# Patient Record
Sex: FEMALE | Race: WHITE | NOT HISPANIC OR LATINO | Employment: OTHER | URBAN - METROPOLITAN AREA
[De-identification: names, ages, dates, MRNs, and addresses within clinical notes are randomized per-mention and may not be internally consistent; named-entity substitution may affect disease eponyms.]

---

## 2017-01-16 ENCOUNTER — COMMUNICATION - HEALTHEAST (OUTPATIENT)
Dept: FAMILY MEDICINE | Facility: CLINIC | Age: 67
End: 2017-01-16

## 2017-01-16 DIAGNOSIS — E78.5 HYPERLIPIDEMIA: ICD-10-CM

## 2017-01-23 ENCOUNTER — OFFICE VISIT - HEALTHEAST (OUTPATIENT)
Dept: FAMILY MEDICINE | Facility: CLINIC | Age: 67
End: 2017-01-23

## 2017-01-23 DIAGNOSIS — E11.319 DIABETIC RETINOPATHY (H): ICD-10-CM

## 2017-01-23 DIAGNOSIS — M81.0 SENILE OSTEOPOROSIS: ICD-10-CM

## 2017-01-23 DIAGNOSIS — E78.49 OTHER HYPERLIPIDEMIA: ICD-10-CM

## 2017-01-23 DIAGNOSIS — I10 ESSENTIAL HYPERTENSION, BENIGN: ICD-10-CM

## 2017-01-23 LAB — HBA1C MFR BLD: 7.5 % (ref 3.5–6)

## 2017-01-23 ASSESSMENT — MIFFLIN-ST. JEOR: SCORE: 1281.54

## 2017-01-24 LAB — LDLC SERPL CALC-MCNC: 95 MG/DL

## 2017-01-25 ENCOUNTER — OFFICE VISIT - HEALTHEAST (OUTPATIENT)
Dept: EDUCATION SERVICES | Facility: CLINIC | Age: 67
End: 2017-01-25

## 2017-01-25 ENCOUNTER — COMMUNICATION - HEALTHEAST (OUTPATIENT)
Dept: EDUCATION SERVICES | Facility: CLINIC | Age: 67
End: 2017-01-25

## 2017-01-31 ENCOUNTER — COMMUNICATION - HEALTHEAST (OUTPATIENT)
Dept: EDUCATION SERVICES | Facility: CLINIC | Age: 67
End: 2017-01-31

## 2017-01-31 ENCOUNTER — COMMUNICATION - HEALTHEAST (OUTPATIENT)
Dept: ADMINISTRATIVE | Facility: CLINIC | Age: 67
End: 2017-01-31

## 2017-02-08 ENCOUNTER — RECORDS - HEALTHEAST (OUTPATIENT)
Dept: BONE DENSITY | Facility: CLINIC | Age: 67
End: 2017-02-08

## 2017-02-08 ENCOUNTER — RECORDS - HEALTHEAST (OUTPATIENT)
Dept: ADMINISTRATIVE | Facility: OTHER | Age: 67
End: 2017-02-08

## 2017-02-08 DIAGNOSIS — M81.0 AGE-RELATED OSTEOPOROSIS WITHOUT CURRENT PATHOLOGICAL FRACTURE: ICD-10-CM

## 2017-02-13 ENCOUNTER — AMBULATORY - HEALTHEAST (OUTPATIENT)
Dept: EDUCATION SERVICES | Facility: CLINIC | Age: 67
End: 2017-02-13

## 2017-02-16 ENCOUNTER — COMMUNICATION - HEALTHEAST (OUTPATIENT)
Dept: FAMILY MEDICINE | Facility: CLINIC | Age: 67
End: 2017-02-16

## 2017-02-16 DIAGNOSIS — E11.9 DM (DIABETES MELLITUS) (H): ICD-10-CM

## 2017-02-18 ENCOUNTER — COMMUNICATION - HEALTHEAST (OUTPATIENT)
Dept: FAMILY MEDICINE | Facility: CLINIC | Age: 67
End: 2017-02-18

## 2017-02-18 DIAGNOSIS — I10 ESSENTIAL HYPERTENSION, BENIGN: ICD-10-CM

## 2017-02-27 ENCOUNTER — OFFICE VISIT - HEALTHEAST (OUTPATIENT)
Dept: FAMILY MEDICINE | Facility: CLINIC | Age: 67
End: 2017-02-27

## 2017-02-27 DIAGNOSIS — R05.9 COUGH: ICD-10-CM

## 2017-02-27 DIAGNOSIS — I10 ESSENTIAL HYPERTENSION, BENIGN: ICD-10-CM

## 2017-02-27 DIAGNOSIS — M54.9 UPPER BACK PAIN ON LEFT SIDE: ICD-10-CM

## 2017-02-27 DIAGNOSIS — R07.9 CHEST PAIN: ICD-10-CM

## 2017-02-27 LAB
ATRIAL RATE - MUSE: 83 BPM
DIASTOLIC BLOOD PRESSURE - MUSE: NORMAL MMHG
INTERPRETATION ECG - MUSE: NORMAL
P AXIS - MUSE: 59 DEGREES
PR INTERVAL - MUSE: 156 MS
QRS DURATION - MUSE: 84 MS
QT - MUSE: 378 MS
QTC - MUSE: 444 MS
R AXIS - MUSE: 23 DEGREES
SYSTOLIC BLOOD PRESSURE - MUSE: NORMAL MMHG
T AXIS - MUSE: 42 DEGREES
VENTRICULAR RATE- MUSE: 83 BPM

## 2017-02-27 ASSESSMENT — MIFFLIN-ST. JEOR: SCORE: 1247.75

## 2017-03-14 ENCOUNTER — COMMUNICATION - HEALTHEAST (OUTPATIENT)
Dept: FAMILY MEDICINE | Facility: CLINIC | Age: 67
End: 2017-03-14

## 2017-03-27 ENCOUNTER — COMMUNICATION - HEALTHEAST (OUTPATIENT)
Dept: EDUCATION SERVICES | Facility: CLINIC | Age: 67
End: 2017-03-27

## 2017-03-27 DIAGNOSIS — E11.9 DIABETES (H): ICD-10-CM

## 2017-03-30 ENCOUNTER — COMMUNICATION - HEALTHEAST (OUTPATIENT)
Dept: FAMILY MEDICINE | Facility: CLINIC | Age: 67
End: 2017-03-30

## 2017-04-03 ENCOUNTER — COMMUNICATION - HEALTHEAST (OUTPATIENT)
Dept: FAMILY MEDICINE | Facility: CLINIC | Age: 67
End: 2017-04-03

## 2017-04-03 ENCOUNTER — AMBULATORY - HEALTHEAST (OUTPATIENT)
Dept: NURSING | Facility: CLINIC | Age: 67
End: 2017-04-03

## 2017-04-11 ENCOUNTER — AMBULATORY - HEALTHEAST (OUTPATIENT)
Dept: NURSING | Facility: CLINIC | Age: 67
End: 2017-04-11

## 2017-04-11 ENCOUNTER — COMMUNICATION - HEALTHEAST (OUTPATIENT)
Dept: FAMILY MEDICINE | Facility: CLINIC | Age: 67
End: 2017-04-11

## 2017-04-18 ENCOUNTER — COMMUNICATION - HEALTHEAST (OUTPATIENT)
Dept: FAMILY MEDICINE | Facility: CLINIC | Age: 67
End: 2017-04-18

## 2017-04-19 ENCOUNTER — COMMUNICATION - HEALTHEAST (OUTPATIENT)
Dept: FAMILY MEDICINE | Facility: CLINIC | Age: 67
End: 2017-04-19

## 2017-04-19 ENCOUNTER — AMBULATORY - HEALTHEAST (OUTPATIENT)
Dept: NURSING | Facility: CLINIC | Age: 67
End: 2017-04-19

## 2017-04-23 ENCOUNTER — COMMUNICATION - HEALTHEAST (OUTPATIENT)
Dept: FAMILY MEDICINE | Facility: CLINIC | Age: 67
End: 2017-04-23

## 2017-05-10 ENCOUNTER — COMMUNICATION - HEALTHEAST (OUTPATIENT)
Dept: FAMILY MEDICINE | Facility: CLINIC | Age: 67
End: 2017-05-10

## 2017-05-16 ENCOUNTER — COMMUNICATION - HEALTHEAST (OUTPATIENT)
Dept: FAMILY MEDICINE | Facility: CLINIC | Age: 67
End: 2017-05-16

## 2017-05-16 DIAGNOSIS — E11.9 DIABETES (H): ICD-10-CM

## 2017-06-01 ENCOUNTER — COMMUNICATION - HEALTHEAST (OUTPATIENT)
Dept: FAMILY MEDICINE | Facility: CLINIC | Age: 67
End: 2017-06-01

## 2017-06-01 DIAGNOSIS — E11.9 DIABETES (H): ICD-10-CM

## 2017-06-14 ENCOUNTER — COMMUNICATION - HEALTHEAST (OUTPATIENT)
Dept: FAMILY MEDICINE | Facility: CLINIC | Age: 67
End: 2017-06-14

## 2017-06-14 ENCOUNTER — OFFICE VISIT - HEALTHEAST (OUTPATIENT)
Dept: FAMILY MEDICINE | Facility: CLINIC | Age: 67
End: 2017-06-14

## 2017-06-14 DIAGNOSIS — M85.80 OSTEOPENIA: ICD-10-CM

## 2017-06-14 DIAGNOSIS — I10 ESSENTIAL HYPERTENSION, BENIGN: ICD-10-CM

## 2017-06-14 DIAGNOSIS — E66.3 OVERWEIGHT: ICD-10-CM

## 2017-06-14 DIAGNOSIS — Z00.00 WELL ADULT EXAM: ICD-10-CM

## 2017-06-14 DIAGNOSIS — E78.49 OTHER HYPERLIPIDEMIA: ICD-10-CM

## 2017-06-14 DIAGNOSIS — E11.9 TYPE 2 DIABETES MELLITUS (H): ICD-10-CM

## 2017-06-14 LAB
CHOLEST SERPL-MCNC: 178 MG/DL
FASTING STATUS PATIENT QL REPORTED: YES
HBA1C MFR BLD: 8.6 % (ref 3.5–6)
HDLC SERPL-MCNC: 66 MG/DL
LDLC SERPL CALC-MCNC: 103 MG/DL
TRIGL SERPL-MCNC: 46 MG/DL

## 2017-06-14 ASSESSMENT — MIFFLIN-ST. JEOR: SCORE: 1211.92

## 2017-07-13 ENCOUNTER — COMMUNICATION - HEALTHEAST (OUTPATIENT)
Dept: FAMILY MEDICINE | Facility: CLINIC | Age: 67
End: 2017-07-13

## 2017-07-13 DIAGNOSIS — E11.9 T2DM (TYPE 2 DIABETES MELLITUS) (H): ICD-10-CM

## 2017-08-03 ENCOUNTER — COMMUNICATION - HEALTHEAST (OUTPATIENT)
Dept: FAMILY MEDICINE | Facility: CLINIC | Age: 67
End: 2017-08-03

## 2017-08-25 ENCOUNTER — COMMUNICATION - HEALTHEAST (OUTPATIENT)
Dept: FAMILY MEDICINE | Facility: CLINIC | Age: 67
End: 2017-08-25

## 2017-09-15 ENCOUNTER — RECORDS - HEALTHEAST (OUTPATIENT)
Dept: ADMINISTRATIVE | Facility: OTHER | Age: 67
End: 2017-09-15

## 2017-09-19 ENCOUNTER — OFFICE VISIT - HEALTHEAST (OUTPATIENT)
Dept: FAMILY MEDICINE | Facility: CLINIC | Age: 67
End: 2017-09-19

## 2017-09-19 DIAGNOSIS — L60.0 INGROWN LEFT BIG TOENAIL: ICD-10-CM

## 2017-09-19 DIAGNOSIS — I10 ESSENTIAL HYPERTENSION, BENIGN: ICD-10-CM

## 2017-09-19 DIAGNOSIS — E78.49 OTHER HYPERLIPIDEMIA: ICD-10-CM

## 2017-09-19 DIAGNOSIS — R25.2 LEG CRAMPS: ICD-10-CM

## 2017-09-19 LAB — HBA1C MFR BLD: 8.2 % (ref 3.5–6)

## 2017-09-19 ASSESSMENT — MIFFLIN-ST. JEOR: SCORE: 1223.71

## 2017-09-20 ENCOUNTER — COMMUNICATION - HEALTHEAST (OUTPATIENT)
Dept: FAMILY MEDICINE | Facility: CLINIC | Age: 67
End: 2017-09-20

## 2017-09-20 DIAGNOSIS — E78.5 HYPERLIPIDEMIA: ICD-10-CM

## 2017-09-29 ENCOUNTER — COMMUNICATION - HEALTHEAST (OUTPATIENT)
Dept: FAMILY MEDICINE | Facility: CLINIC | Age: 67
End: 2017-09-29

## 2017-10-23 ENCOUNTER — COMMUNICATION - HEALTHEAST (OUTPATIENT)
Dept: SCHEDULING | Facility: CLINIC | Age: 67
End: 2017-10-23

## 2017-10-24 ENCOUNTER — COMMUNICATION - HEALTHEAST (OUTPATIENT)
Dept: SCHEDULING | Facility: CLINIC | Age: 67
End: 2017-10-24

## 2017-11-10 ENCOUNTER — COMMUNICATION - HEALTHEAST (OUTPATIENT)
Dept: ADMINISTRATIVE | Facility: CLINIC | Age: 67
End: 2017-11-10

## 2017-11-13 ENCOUNTER — COMMUNICATION - HEALTHEAST (OUTPATIENT)
Dept: FAMILY MEDICINE | Facility: CLINIC | Age: 67
End: 2017-11-13

## 2017-11-14 ENCOUNTER — COMMUNICATION - HEALTHEAST (OUTPATIENT)
Dept: FAMILY MEDICINE | Facility: CLINIC | Age: 67
End: 2017-11-14

## 2017-11-16 ENCOUNTER — COMMUNICATION - HEALTHEAST (OUTPATIENT)
Dept: ADMINISTRATIVE | Facility: CLINIC | Age: 67
End: 2017-11-16

## 2017-11-16 ENCOUNTER — COMMUNICATION - HEALTHEAST (OUTPATIENT)
Dept: FAMILY MEDICINE | Facility: CLINIC | Age: 67
End: 2017-11-16

## 2017-11-16 ENCOUNTER — AMBULATORY - HEALTHEAST (OUTPATIENT)
Dept: ENDOCRINOLOGY | Facility: CLINIC | Age: 67
End: 2017-11-16

## 2017-12-01 ENCOUNTER — COMMUNICATION - HEALTHEAST (OUTPATIENT)
Dept: ENDOCRINOLOGY | Facility: CLINIC | Age: 67
End: 2017-12-01

## 2017-12-06 ENCOUNTER — COMMUNICATION - HEALTHEAST (OUTPATIENT)
Dept: FAMILY MEDICINE | Facility: CLINIC | Age: 67
End: 2017-12-06

## 2017-12-07 ENCOUNTER — COMMUNICATION - HEALTHEAST (OUTPATIENT)
Dept: FAMILY MEDICINE | Facility: CLINIC | Age: 67
End: 2017-12-07

## 2017-12-18 ENCOUNTER — COMMUNICATION - HEALTHEAST (OUTPATIENT)
Dept: ADMINISTRATIVE | Facility: CLINIC | Age: 67
End: 2017-12-18

## 2017-12-18 ENCOUNTER — COMMUNICATION - HEALTHEAST (OUTPATIENT)
Dept: FAMILY MEDICINE | Facility: CLINIC | Age: 67
End: 2017-12-18

## 2017-12-19 ENCOUNTER — OFFICE VISIT - HEALTHEAST (OUTPATIENT)
Dept: FAMILY MEDICINE | Facility: CLINIC | Age: 67
End: 2017-12-19

## 2017-12-19 DIAGNOSIS — J40 BRONCHITIS: ICD-10-CM

## 2017-12-19 DIAGNOSIS — J01.90 ACUTE NON-RECURRENT SINUSITIS, UNSPECIFIED LOCATION: ICD-10-CM

## 2017-12-19 ASSESSMENT — MIFFLIN-ST. JEOR: SCORE: 1211.02

## 2018-01-04 ENCOUNTER — AMBULATORY - HEALTHEAST (OUTPATIENT)
Dept: LAB | Facility: CLINIC | Age: 68
End: 2018-01-04

## 2018-01-04 LAB
ANION GAP SERPL CALCULATED.3IONS-SCNC: 9 MMOL/L (ref 5–18)
BUN SERPL-MCNC: 13 MG/DL (ref 8–22)
CALCIUM SERPL-MCNC: 9.8 MG/DL (ref 8.5–10.5)
CHLORIDE BLD-SCNC: 103 MMOL/L (ref 98–107)
CO2 SERPL-SCNC: 28 MMOL/L (ref 22–31)
CREAT SERPL-MCNC: 0.68 MG/DL (ref 0.6–1.1)
GFR SERPL CREATININE-BSD FRML MDRD: >60 ML/MIN/1.73M2
GLUCOSE BLD-MCNC: 94 MG/DL (ref 70–125)
HBA1C MFR BLD: 8.9 % (ref 3.5–6)
POTASSIUM BLD-SCNC: 5.2 MMOL/L (ref 3.5–5)
SODIUM SERPL-SCNC: 140 MMOL/L (ref 136–145)

## 2018-01-16 ENCOUNTER — OFFICE VISIT - HEALTHEAST (OUTPATIENT)
Dept: ENDOCRINOLOGY | Facility: CLINIC | Age: 68
End: 2018-01-16

## 2018-01-16 ENCOUNTER — AMBULATORY - HEALTHEAST (OUTPATIENT)
Dept: ENDOCRINOLOGY | Facility: CLINIC | Age: 68
End: 2018-01-16

## 2018-01-16 ENCOUNTER — COMMUNICATION - HEALTHEAST (OUTPATIENT)
Dept: FAMILY MEDICINE | Facility: CLINIC | Age: 68
End: 2018-01-16

## 2018-01-16 DIAGNOSIS — E11.9 T2DM (TYPE 2 DIABETES MELLITUS) (H): ICD-10-CM

## 2018-01-16 ASSESSMENT — MIFFLIN-ST. JEOR: SCORE: 1230.52

## 2018-02-15 ENCOUNTER — COMMUNICATION - HEALTHEAST (OUTPATIENT)
Dept: FAMILY MEDICINE | Facility: CLINIC | Age: 68
End: 2018-02-15

## 2018-02-15 DIAGNOSIS — E11.9 DIABETES (H): ICD-10-CM

## 2018-02-15 DIAGNOSIS — E11.9 DM (DIABETES MELLITUS) (H): ICD-10-CM

## 2018-03-19 ENCOUNTER — COMMUNICATION - HEALTHEAST (OUTPATIENT)
Dept: FAMILY MEDICINE | Facility: CLINIC | Age: 68
End: 2018-03-19

## 2018-03-19 DIAGNOSIS — I10 ESSENTIAL HYPERTENSION, BENIGN: ICD-10-CM

## 2018-03-25 ENCOUNTER — COMMUNICATION - HEALTHEAST (OUTPATIENT)
Dept: FAMILY MEDICINE | Facility: CLINIC | Age: 68
End: 2018-03-25

## 2018-03-25 DIAGNOSIS — E11.9 T2DM (TYPE 2 DIABETES MELLITUS) (H): ICD-10-CM

## 2018-04-17 ENCOUNTER — AMBULATORY - HEALTHEAST (OUTPATIENT)
Dept: LAB | Facility: CLINIC | Age: 68
End: 2018-04-17

## 2018-04-17 ENCOUNTER — COMMUNICATION - HEALTHEAST (OUTPATIENT)
Dept: ENDOCRINOLOGY | Facility: CLINIC | Age: 68
End: 2018-04-17

## 2018-04-17 LAB — HBA1C MFR BLD: 9.2 % (ref 3.5–6)

## 2018-04-24 ENCOUNTER — COMMUNICATION - HEALTHEAST (OUTPATIENT)
Dept: ADMINISTRATIVE | Facility: CLINIC | Age: 68
End: 2018-04-24

## 2018-04-25 ENCOUNTER — COMMUNICATION - HEALTHEAST (OUTPATIENT)
Dept: SCHEDULING | Facility: CLINIC | Age: 68
End: 2018-04-25

## 2018-05-15 ENCOUNTER — AMBULATORY - HEALTHEAST (OUTPATIENT)
Dept: ENDOCRINOLOGY | Facility: CLINIC | Age: 68
End: 2018-05-15

## 2018-05-15 ENCOUNTER — OFFICE VISIT - HEALTHEAST (OUTPATIENT)
Dept: ENDOCRINOLOGY | Facility: CLINIC | Age: 68
End: 2018-05-15

## 2018-05-15 DIAGNOSIS — E11.9 DIABETES (H): ICD-10-CM

## 2018-05-15 DIAGNOSIS — J06.9 UPPER RESPIRATORY TRACT INFECTION, UNSPECIFIED TYPE: ICD-10-CM

## 2018-05-15 ASSESSMENT — MIFFLIN-ST. JEOR: SCORE: 1268.62

## 2018-05-21 ENCOUNTER — COMMUNICATION - HEALTHEAST (OUTPATIENT)
Dept: ENDOCRINOLOGY | Facility: CLINIC | Age: 68
End: 2018-05-21

## 2018-06-30 ENCOUNTER — COMMUNICATION - HEALTHEAST (OUTPATIENT)
Dept: FAMILY MEDICINE | Facility: CLINIC | Age: 68
End: 2018-06-30

## 2018-06-30 DIAGNOSIS — E11.9 T2DM (TYPE 2 DIABETES MELLITUS) (H): ICD-10-CM

## 2018-07-09 ENCOUNTER — COMMUNICATION - HEALTHEAST (OUTPATIENT)
Dept: FAMILY MEDICINE | Facility: CLINIC | Age: 68
End: 2018-07-09

## 2018-07-09 DIAGNOSIS — E11.9 T2DM (TYPE 2 DIABETES MELLITUS) (H): ICD-10-CM

## 2018-07-10 ENCOUNTER — RECORDS - HEALTHEAST (OUTPATIENT)
Dept: MAMMOGRAPHY | Facility: CLINIC | Age: 68
End: 2018-07-10

## 2018-07-10 DIAGNOSIS — Z12.31 ENCOUNTER FOR SCREENING MAMMOGRAM FOR MALIGNANT NEOPLASM OF BREAST: ICD-10-CM

## 2018-07-30 ENCOUNTER — COMMUNICATION - HEALTHEAST (OUTPATIENT)
Dept: ADMINISTRATIVE | Facility: CLINIC | Age: 68
End: 2018-07-30

## 2018-07-30 ENCOUNTER — AMBULATORY - HEALTHEAST (OUTPATIENT)
Dept: ENDOCRINOLOGY | Facility: CLINIC | Age: 68
End: 2018-07-30

## 2018-08-01 ENCOUNTER — COMMUNICATION - HEALTHEAST (OUTPATIENT)
Dept: ADMINISTRATIVE | Facility: CLINIC | Age: 68
End: 2018-08-01

## 2018-08-15 ENCOUNTER — AMBULATORY - HEALTHEAST (OUTPATIENT)
Dept: LAB | Facility: CLINIC | Age: 68
End: 2018-08-15

## 2018-08-15 LAB
ANION GAP SERPL CALCULATED.3IONS-SCNC: 9 MMOL/L (ref 5–18)
BUN SERPL-MCNC: 13 MG/DL (ref 8–22)
CALCIUM SERPL-MCNC: 10.4 MG/DL (ref 8.5–10.5)
CHLORIDE BLD-SCNC: 97 MMOL/L (ref 98–107)
CO2 SERPL-SCNC: 26 MMOL/L (ref 22–31)
CREAT SERPL-MCNC: 0.66 MG/DL (ref 0.6–1.1)
CREAT UR-MCNC: 25.9 MG/DL
GFR SERPL CREATININE-BSD FRML MDRD: >60 ML/MIN/1.73M2
GLUCOSE BLD-MCNC: 105 MG/DL (ref 70–125)
LDLC SERPL CALC-MCNC: 110 MG/DL
MICROALBUMIN UR-MCNC: <0.5 MG/DL (ref 0–1.99)
MICROALBUMIN/CREAT UR: NORMAL MG/G
POTASSIUM BLD-SCNC: 5.2 MMOL/L (ref 3.5–5)
SODIUM SERPL-SCNC: 132 MMOL/L (ref 136–145)

## 2018-08-16 LAB — HBA1C MFR BLD: 8.1 % (ref 4.2–6.1)

## 2018-08-21 ENCOUNTER — OFFICE VISIT - HEALTHEAST (OUTPATIENT)
Dept: ENDOCRINOLOGY | Facility: CLINIC | Age: 68
End: 2018-08-21

## 2018-08-21 ENCOUNTER — AMBULATORY - HEALTHEAST (OUTPATIENT)
Dept: ENDOCRINOLOGY | Facility: CLINIC | Age: 68
End: 2018-08-21

## 2018-08-21 ASSESSMENT — MIFFLIN-ST. JEOR: SCORE: 1265.45

## 2018-10-15 ENCOUNTER — RECORDS - HEALTHEAST (OUTPATIENT)
Dept: ADMINISTRATIVE | Facility: OTHER | Age: 68
End: 2018-10-15

## 2018-11-07 ENCOUNTER — OFFICE VISIT - HEALTHEAST (OUTPATIENT)
Dept: FAMILY MEDICINE | Facility: CLINIC | Age: 68
End: 2018-11-07

## 2018-11-07 ENCOUNTER — COMMUNICATION - HEALTHEAST (OUTPATIENT)
Dept: SCHEDULING | Facility: CLINIC | Age: 68
End: 2018-11-07

## 2018-11-07 DIAGNOSIS — R25.2 LEG CRAMPING: ICD-10-CM

## 2018-11-07 DIAGNOSIS — R19.7 VOMITING AND DIARRHEA: ICD-10-CM

## 2018-11-07 DIAGNOSIS — R22.0 TONGUE SWELLING: ICD-10-CM

## 2018-11-07 DIAGNOSIS — R11.10 VOMITING AND DIARRHEA: ICD-10-CM

## 2018-11-12 ENCOUNTER — COMMUNICATION - HEALTHEAST (OUTPATIENT)
Dept: TELEHEALTH | Facility: CLINIC | Age: 68
End: 2018-11-12

## 2018-11-12 ENCOUNTER — AMBULATORY - HEALTHEAST (OUTPATIENT)
Dept: ENDOCRINOLOGY | Facility: CLINIC | Age: 68
End: 2018-11-12

## 2018-11-12 ENCOUNTER — COMMUNICATION - HEALTHEAST (OUTPATIENT)
Dept: ENDOCRINOLOGY | Facility: CLINIC | Age: 68
End: 2018-11-12

## 2018-11-12 ENCOUNTER — COMMUNICATION - HEALTHEAST (OUTPATIENT)
Dept: CARE COORDINATION | Facility: CLINIC | Age: 68
End: 2018-11-12

## 2018-11-19 ENCOUNTER — OFFICE VISIT - HEALTHEAST (OUTPATIENT)
Dept: FAMILY MEDICINE | Facility: CLINIC | Age: 68
End: 2018-11-19

## 2018-11-19 DIAGNOSIS — Z79.4 TYPE 2 DIABETES MELLITUS WITH DIABETIC NEUROPATHY, WITH LONG-TERM CURRENT USE OF INSULIN (H): ICD-10-CM

## 2018-11-19 DIAGNOSIS — I10 ESSENTIAL HYPERTENSION, BENIGN: ICD-10-CM

## 2018-11-19 DIAGNOSIS — M54.41 RIGHT-SIDED LOW BACK PAIN WITH RIGHT-SIDED SCIATICA, UNSPECIFIED CHRONICITY: ICD-10-CM

## 2018-11-19 DIAGNOSIS — E11.40 TYPE 2 DIABETES MELLITUS WITH DIABETIC NEUROPATHY, WITH LONG-TERM CURRENT USE OF INSULIN (H): ICD-10-CM

## 2018-11-19 DIAGNOSIS — E87.1 HYPONATREMIA: ICD-10-CM

## 2018-11-19 DIAGNOSIS — T78.3XXD ANGIOEDEMA, SUBSEQUENT ENCOUNTER: ICD-10-CM

## 2018-11-19 DIAGNOSIS — Z09 HOSPITAL DISCHARGE FOLLOW-UP: ICD-10-CM

## 2018-11-19 DIAGNOSIS — J32.9 SINUSITIS, UNSPECIFIED CHRONICITY, UNSPECIFIED LOCATION: ICD-10-CM

## 2018-11-19 LAB
ANION GAP SERPL CALCULATED.3IONS-SCNC: 10 MMOL/L (ref 5–18)
BUN SERPL-MCNC: 14 MG/DL (ref 8–22)
CALCIUM SERPL-MCNC: 10.4 MG/DL (ref 8.5–10.5)
CHLORIDE BLD-SCNC: 97 MMOL/L (ref 98–107)
CO2 SERPL-SCNC: 28 MMOL/L (ref 22–31)
CREAT SERPL-MCNC: 0.71 MG/DL (ref 0.6–1.1)
GFR SERPL CREATININE-BSD FRML MDRD: >60 ML/MIN/1.73M2
GLUCOSE BLD-MCNC: 290 MG/DL (ref 70–125)
HBA1C MFR BLD: 9.5 % (ref 4.2–6.1)
POTASSIUM BLD-SCNC: 5.2 MMOL/L (ref 3.5–5)
SODIUM SERPL-SCNC: 135 MMOL/L (ref 136–145)

## 2018-11-19 ASSESSMENT — MIFFLIN-ST. JEOR: SCORE: 1250.71

## 2018-11-20 ENCOUNTER — COMMUNICATION - HEALTHEAST (OUTPATIENT)
Dept: FAMILY MEDICINE | Facility: CLINIC | Age: 68
End: 2018-11-20

## 2018-11-26 ENCOUNTER — COMMUNICATION - HEALTHEAST (OUTPATIENT)
Dept: FAMILY MEDICINE | Facility: CLINIC | Age: 68
End: 2018-11-26

## 2018-11-26 ENCOUNTER — COMMUNICATION - HEALTHEAST (OUTPATIENT)
Dept: ENDOCRINOLOGY | Facility: CLINIC | Age: 68
End: 2018-11-26

## 2018-11-26 DIAGNOSIS — E11.40 TYPE 2 DIABETES MELLITUS WITH DIABETIC NEUROPATHY, WITH LONG-TERM CURRENT USE OF INSULIN (H): ICD-10-CM

## 2018-11-26 DIAGNOSIS — Z79.4 TYPE 2 DIABETES MELLITUS WITH DIABETIC NEUROPATHY, WITH LONG-TERM CURRENT USE OF INSULIN (H): ICD-10-CM

## 2018-12-03 ENCOUNTER — COMMUNICATION - HEALTHEAST (OUTPATIENT)
Dept: FAMILY MEDICINE | Facility: CLINIC | Age: 68
End: 2018-12-03

## 2018-12-06 ENCOUNTER — COMMUNICATION - HEALTHEAST (OUTPATIENT)
Dept: ADMINISTRATIVE | Facility: CLINIC | Age: 68
End: 2018-12-06

## 2018-12-06 ENCOUNTER — COMMUNICATION - HEALTHEAST (OUTPATIENT)
Dept: FAMILY MEDICINE | Facility: CLINIC | Age: 68
End: 2018-12-06

## 2018-12-11 ENCOUNTER — AMBULATORY - HEALTHEAST (OUTPATIENT)
Dept: NURSING | Facility: CLINIC | Age: 68
End: 2018-12-11

## 2018-12-19 ENCOUNTER — RECORDS - HEALTHEAST (OUTPATIENT)
Dept: ADMINISTRATIVE | Facility: OTHER | Age: 68
End: 2018-12-19

## 2019-01-29 ENCOUNTER — COMMUNICATION - HEALTHEAST (OUTPATIENT)
Dept: FAMILY MEDICINE | Facility: CLINIC | Age: 69
End: 2019-01-29

## 2019-02-01 ENCOUNTER — AMBULATORY - HEALTHEAST (OUTPATIENT)
Dept: FAMILY MEDICINE | Facility: CLINIC | Age: 69
End: 2019-02-01

## 2019-02-04 ENCOUNTER — AMBULATORY - HEALTHEAST (OUTPATIENT)
Dept: NURSING | Facility: CLINIC | Age: 69
End: 2019-02-04

## 2019-02-11 ENCOUNTER — COMMUNICATION - HEALTHEAST (OUTPATIENT)
Dept: FAMILY MEDICINE | Facility: CLINIC | Age: 69
End: 2019-02-11

## 2019-03-06 ENCOUNTER — AMBULATORY - HEALTHEAST (OUTPATIENT)
Dept: LAB | Facility: CLINIC | Age: 69
End: 2019-03-06

## 2019-03-06 LAB
ANION GAP SERPL CALCULATED.3IONS-SCNC: 11 MMOL/L (ref 5–18)
BUN SERPL-MCNC: 12 MG/DL (ref 8–22)
CALCIUM SERPL-MCNC: 9.9 MG/DL (ref 8.5–10.5)
CHLORIDE BLD-SCNC: 99 MMOL/L (ref 98–107)
CO2 SERPL-SCNC: 26 MMOL/L (ref 22–31)
CREAT SERPL-MCNC: 0.67 MG/DL (ref 0.6–1.1)
GFR SERPL CREATININE-BSD FRML MDRD: >60 ML/MIN/1.73M2
GLUCOSE BLD-MCNC: 118 MG/DL (ref 70–125)
HBA1C MFR BLD: 7.5 % (ref 3.5–6)
POTASSIUM BLD-SCNC: 4.5 MMOL/L (ref 3.5–5)
SODIUM SERPL-SCNC: 136 MMOL/L (ref 136–145)

## 2019-03-13 ENCOUNTER — OFFICE VISIT - HEALTHEAST (OUTPATIENT)
Dept: ENDOCRINOLOGY | Facility: CLINIC | Age: 69
End: 2019-03-13

## 2019-03-13 ASSESSMENT — MIFFLIN-ST. JEOR: SCORE: 1282.91

## 2019-03-14 ENCOUNTER — COMMUNICATION - HEALTHEAST (OUTPATIENT)
Dept: FAMILY MEDICINE | Facility: CLINIC | Age: 69
End: 2019-03-14

## 2019-04-11 ENCOUNTER — COMMUNICATION - HEALTHEAST (OUTPATIENT)
Dept: SCHEDULING | Facility: CLINIC | Age: 69
End: 2019-04-11

## 2019-04-18 ENCOUNTER — COMMUNICATION - HEALTHEAST (OUTPATIENT)
Dept: FAMILY MEDICINE | Facility: CLINIC | Age: 69
End: 2019-04-18

## 2019-04-18 DIAGNOSIS — Z23 ENCOUNTER FOR IMMUNIZATION: ICD-10-CM

## 2019-04-24 ENCOUNTER — COMMUNICATION - HEALTHEAST (OUTPATIENT)
Dept: FAMILY MEDICINE | Facility: CLINIC | Age: 69
End: 2019-04-24

## 2019-05-06 ENCOUNTER — AMBULATORY - HEALTHEAST (OUTPATIENT)
Dept: NURSING | Facility: CLINIC | Age: 69
End: 2019-05-06

## 2019-05-06 DIAGNOSIS — Z23 ENCOUNTER FOR IMMUNIZATION: ICD-10-CM

## 2019-05-15 ENCOUNTER — COMMUNICATION - HEALTHEAST (OUTPATIENT)
Dept: FAMILY MEDICINE | Facility: CLINIC | Age: 69
End: 2019-05-15

## 2019-06-03 ENCOUNTER — COMMUNICATION - HEALTHEAST (OUTPATIENT)
Dept: FAMILY MEDICINE | Facility: CLINIC | Age: 69
End: 2019-06-03

## 2019-06-03 DIAGNOSIS — E11.9 DIABETES (H): ICD-10-CM

## 2019-06-19 ENCOUNTER — OFFICE VISIT - HEALTHEAST (OUTPATIENT)
Dept: FAMILY MEDICINE | Facility: CLINIC | Age: 69
End: 2019-06-19

## 2019-06-19 ENCOUNTER — COMMUNICATION - HEALTHEAST (OUTPATIENT)
Dept: FAMILY MEDICINE | Facility: CLINIC | Age: 69
End: 2019-06-19

## 2019-06-19 DIAGNOSIS — R93.7 ABNORMAL BONE DENSITY SCREENING: ICD-10-CM

## 2019-06-19 DIAGNOSIS — E11.319 DIABETIC RETINOPATHY ASSOCIATED WITH TYPE 2 DIABETES MELLITUS, MACULAR EDEMA PRESENCE UNSPECIFIED, UNSPECIFIED LATERALITY, UNSPECIFIED RETINOPATHY SEVERITY (H): ICD-10-CM

## 2019-06-19 DIAGNOSIS — Z12.11 SCREEN FOR COLON CANCER: ICD-10-CM

## 2019-06-19 DIAGNOSIS — Z13.820 SCREENING FOR OSTEOPOROSIS: ICD-10-CM

## 2019-06-19 DIAGNOSIS — Z12.31 VISIT FOR SCREENING MAMMOGRAM: ICD-10-CM

## 2019-06-19 DIAGNOSIS — E11.42 DIABETIC POLYNEUROPATHY ASSOCIATED WITH TYPE 2 DIABETES MELLITUS (H): ICD-10-CM

## 2019-06-19 DIAGNOSIS — Z79.4 TYPE 2 DIABETES MELLITUS WITH DIABETIC NEUROPATHY, WITH LONG-TERM CURRENT USE OF INSULIN (H): ICD-10-CM

## 2019-06-19 DIAGNOSIS — I10 ESSENTIAL HYPERTENSION, BENIGN: ICD-10-CM

## 2019-06-19 DIAGNOSIS — R25.2 CRAMPS OF LOWER EXTREMITY: ICD-10-CM

## 2019-06-19 DIAGNOSIS — Z13.220 SCREENING CHOLESTEROL LEVEL: ICD-10-CM

## 2019-06-19 DIAGNOSIS — E11.40 TYPE 2 DIABETES MELLITUS WITH DIABETIC NEUROPATHY, WITH LONG-TERM CURRENT USE OF INSULIN (H): ICD-10-CM

## 2019-06-19 LAB
ALBUMIN SERPL-MCNC: 4 G/DL (ref 3.5–5)
ALP SERPL-CCNC: 77 U/L (ref 45–120)
ALT SERPL W P-5'-P-CCNC: 17 U/L (ref 0–45)
ANION GAP SERPL CALCULATED.3IONS-SCNC: 8 MMOL/L (ref 5–18)
AST SERPL W P-5'-P-CCNC: 19 U/L (ref 0–40)
BILIRUB SERPL-MCNC: 0.3 MG/DL (ref 0–1)
BUN SERPL-MCNC: 16 MG/DL (ref 8–22)
CALCIUM SERPL-MCNC: 9.9 MG/DL (ref 8.5–10.5)
CHLORIDE BLD-SCNC: 104 MMOL/L (ref 98–107)
CHOLEST SERPL-MCNC: 202 MG/DL
CO2 SERPL-SCNC: 27 MMOL/L (ref 22–31)
CREAT SERPL-MCNC: 0.65 MG/DL (ref 0.6–1.1)
CREAT UR-MCNC: 44.7 MG/DL
FASTING STATUS PATIENT QL REPORTED: YES
GFR SERPL CREATININE-BSD FRML MDRD: >60 ML/MIN/1.73M2
GLUCOSE BLD-MCNC: 93 MG/DL (ref 70–125)
HBA1C MFR BLD: 7.3 % (ref 3.5–6)
HDLC SERPL-MCNC: 76 MG/DL
LDLC SERPL CALC-MCNC: 116 MG/DL
MAGNESIUM SERPL-MCNC: 2 MG/DL (ref 1.8–2.6)
MICROALBUMIN UR-MCNC: 0.89 MG/DL (ref 0–1.99)
MICROALBUMIN/CREAT UR: 19.9 MG/G
POTASSIUM BLD-SCNC: 5.1 MMOL/L (ref 3.5–5)
PROT SERPL-MCNC: 6.3 G/DL (ref 6–8)
SODIUM SERPL-SCNC: 139 MMOL/L (ref 136–145)
TRIGL SERPL-MCNC: 49 MG/DL
VIT B12 SERPL-MCNC: 856 PG/ML (ref 213–816)

## 2019-06-19 ASSESSMENT — MIFFLIN-ST. JEOR: SCORE: 1270.44

## 2019-06-20 ENCOUNTER — AMBULATORY - HEALTHEAST (OUTPATIENT)
Dept: SCHEDULING | Facility: CLINIC | Age: 69
End: 2019-06-20

## 2019-06-20 DIAGNOSIS — R93.7 ABNORMAL BONE DENSITY SCREENING: ICD-10-CM

## 2019-06-22 ENCOUNTER — COMMUNICATION - HEALTHEAST (OUTPATIENT)
Dept: FAMILY MEDICINE | Facility: CLINIC | Age: 69
End: 2019-06-22

## 2019-06-24 ENCOUNTER — AMBULATORY - HEALTHEAST (OUTPATIENT)
Dept: FAMILY MEDICINE | Facility: CLINIC | Age: 69
End: 2019-06-24

## 2019-06-24 DIAGNOSIS — I10 ESSENTIAL HYPERTENSION, BENIGN: ICD-10-CM

## 2019-06-25 ENCOUNTER — OFFICE VISIT - HEALTHEAST (OUTPATIENT)
Dept: PHYSICAL THERAPY | Facility: REHABILITATION | Age: 69
End: 2019-06-25

## 2019-06-25 DIAGNOSIS — R25.2 CRAMP OF BOTH LOWER EXTREMITIES: ICD-10-CM

## 2019-06-25 DIAGNOSIS — E11.42 DIABETIC POLYNEUROPATHY ASSOCIATED WITH TYPE 2 DIABETES MELLITUS (H): ICD-10-CM

## 2019-06-25 DIAGNOSIS — R25.2 CRAMPS OF LOWER EXTREMITY: ICD-10-CM

## 2019-07-08 ENCOUNTER — OFFICE VISIT - HEALTHEAST (OUTPATIENT)
Dept: PHYSICAL THERAPY | Facility: REHABILITATION | Age: 69
End: 2019-07-08

## 2019-07-08 DIAGNOSIS — E11.42 DIABETIC POLYNEUROPATHY ASSOCIATED WITH TYPE 2 DIABETES MELLITUS (H): ICD-10-CM

## 2019-07-08 DIAGNOSIS — R25.2 CRAMPS OF LOWER EXTREMITY: ICD-10-CM

## 2019-07-08 DIAGNOSIS — R25.2 CRAMP OF BOTH LOWER EXTREMITIES: ICD-10-CM

## 2019-07-11 ENCOUNTER — AMBULATORY - HEALTHEAST (OUTPATIENT)
Dept: FAMILY MEDICINE | Facility: CLINIC | Age: 69
End: 2019-07-11

## 2019-07-11 DIAGNOSIS — M85.9 LOW BONE DENSITY: ICD-10-CM

## 2019-07-11 DIAGNOSIS — Z82.62 FAMILY HISTORY OF OSTEOPOROSIS: ICD-10-CM

## 2019-07-12 ENCOUNTER — RECORDS - HEALTHEAST (OUTPATIENT)
Dept: MAMMOGRAPHY | Facility: CLINIC | Age: 69
End: 2019-07-12

## 2019-07-12 DIAGNOSIS — Z12.31 ENCOUNTER FOR SCREENING MAMMOGRAM FOR MALIGNANT NEOPLASM OF BREAST: ICD-10-CM

## 2019-07-19 ENCOUNTER — OFFICE VISIT - HEALTHEAST (OUTPATIENT)
Dept: PHYSICAL THERAPY | Facility: REHABILITATION | Age: 69
End: 2019-07-19

## 2019-07-19 DIAGNOSIS — R25.2 CRAMPS OF LOWER EXTREMITY: ICD-10-CM

## 2019-07-19 DIAGNOSIS — E11.42 DIABETIC POLYNEUROPATHY ASSOCIATED WITH TYPE 2 DIABETES MELLITUS (H): ICD-10-CM

## 2019-07-19 DIAGNOSIS — R25.2 CRAMP OF BOTH LOWER EXTREMITIES: ICD-10-CM

## 2019-07-24 ENCOUNTER — OFFICE VISIT - HEALTHEAST (OUTPATIENT)
Dept: EDUCATION SERVICES | Facility: CLINIC | Age: 69
End: 2019-07-24

## 2019-08-02 ENCOUNTER — COMMUNICATION - HEALTHEAST (OUTPATIENT)
Dept: FAMILY MEDICINE | Facility: CLINIC | Age: 69
End: 2019-08-02

## 2019-08-07 ENCOUNTER — AMBULATORY - HEALTHEAST (OUTPATIENT)
Dept: FAMILY MEDICINE | Facility: CLINIC | Age: 69
End: 2019-08-07

## 2019-08-07 ENCOUNTER — COMMUNICATION - HEALTHEAST (OUTPATIENT)
Dept: FAMILY MEDICINE | Facility: CLINIC | Age: 69
End: 2019-08-07

## 2019-08-07 DIAGNOSIS — I10 ESSENTIAL HYPERTENSION, BENIGN: ICD-10-CM

## 2019-08-09 ENCOUNTER — COMMUNICATION - HEALTHEAST (OUTPATIENT)
Dept: FAMILY MEDICINE | Facility: CLINIC | Age: 69
End: 2019-08-09

## 2019-08-12 ENCOUNTER — COMMUNICATION - HEALTHEAST (OUTPATIENT)
Dept: FAMILY MEDICINE | Facility: CLINIC | Age: 69
End: 2019-08-12

## 2019-08-12 ENCOUNTER — AMBULATORY - HEALTHEAST (OUTPATIENT)
Dept: NURSING | Facility: CLINIC | Age: 69
End: 2019-08-12

## 2019-08-12 DIAGNOSIS — I10 ESSENTIAL HYPERTENSION, BENIGN: ICD-10-CM

## 2019-09-18 ENCOUNTER — OFFICE VISIT - HEALTHEAST (OUTPATIENT)
Dept: EDUCATION SERVICES | Facility: CLINIC | Age: 69
End: 2019-09-18

## 2019-09-18 ENCOUNTER — COMMUNICATION - HEALTHEAST (OUTPATIENT)
Dept: EDUCATION SERVICES | Facility: CLINIC | Age: 69
End: 2019-09-18

## 2019-09-18 RX ORDER — GLUCOSAMINE HCL/CHONDROITIN SU 500-400 MG
1 CAPSULE ORAL 3 TIMES DAILY
Qty: 300 STRIP | Refills: 3 | Status: SHIPPED | OUTPATIENT
Start: 2019-09-18 | End: 2021-08-16

## 2019-09-19 ENCOUNTER — OFFICE VISIT - HEALTHEAST (OUTPATIENT)
Dept: FAMILY MEDICINE | Facility: CLINIC | Age: 69
End: 2019-09-19

## 2019-09-19 DIAGNOSIS — E11.42 DIABETIC POLYNEUROPATHY ASSOCIATED WITH TYPE 2 DIABETES MELLITUS (H): ICD-10-CM

## 2019-09-19 DIAGNOSIS — I10 ESSENTIAL HYPERTENSION, BENIGN: ICD-10-CM

## 2019-09-19 DIAGNOSIS — E11.40 TYPE 2 DIABETES MELLITUS WITH DIABETIC NEUROPATHY, WITH LONG-TERM CURRENT USE OF INSULIN (H): ICD-10-CM

## 2019-09-19 DIAGNOSIS — Z79.4 TYPE 2 DIABETES MELLITUS WITH DIABETIC NEUROPATHY, WITH LONG-TERM CURRENT USE OF INSULIN (H): ICD-10-CM

## 2019-09-19 DIAGNOSIS — E78.49 OTHER HYPERLIPIDEMIA: ICD-10-CM

## 2019-09-19 LAB
ALBUMIN SERPL-MCNC: 4 G/DL (ref 3.5–5)
ALP SERPL-CCNC: 87 U/L (ref 45–120)
ALT SERPL W P-5'-P-CCNC: 18 U/L (ref 0–45)
ANION GAP SERPL CALCULATED.3IONS-SCNC: 6 MMOL/L (ref 5–18)
AST SERPL W P-5'-P-CCNC: 18 U/L (ref 0–40)
BILIRUB SERPL-MCNC: 0.4 MG/DL (ref 0–1)
BUN SERPL-MCNC: 11 MG/DL (ref 8–28)
CALCIUM SERPL-MCNC: 9.9 MG/DL (ref 8.5–10.5)
CHLORIDE BLD-SCNC: 103 MMOL/L (ref 98–107)
CO2 SERPL-SCNC: 29 MMOL/L (ref 22–31)
CREAT SERPL-MCNC: 0.63 MG/DL (ref 0.6–1.1)
GFR SERPL CREATININE-BSD FRML MDRD: >60 ML/MIN/1.73M2
GLUCOSE BLD-MCNC: 91 MG/DL (ref 70–125)
HBA1C MFR BLD: 8.1 % (ref 3.5–6)
POTASSIUM BLD-SCNC: 4.9 MMOL/L (ref 3.5–5)
PROT SERPL-MCNC: 6.2 G/DL (ref 6–8)
SODIUM SERPL-SCNC: 138 MMOL/L (ref 136–145)

## 2019-09-19 ASSESSMENT — MIFFLIN-ST. JEOR: SCORE: 1288.13

## 2019-09-27 ENCOUNTER — OFFICE VISIT - HEALTHEAST (OUTPATIENT)
Dept: INTERNAL MEDICINE | Facility: CLINIC | Age: 69
End: 2019-09-27

## 2019-09-27 DIAGNOSIS — M85.89 OSTEOPENIA OF MULTIPLE SITES: ICD-10-CM

## 2019-09-27 DIAGNOSIS — R53.82 CHRONIC FATIGUE: ICD-10-CM

## 2019-09-27 LAB
PTH-INTACT SERPL-MCNC: 33 PG/ML (ref 10–86)
TSH SERPL DL<=0.005 MIU/L-ACNC: 1.88 UIU/ML (ref 0.3–5)

## 2019-09-27 ASSESSMENT — MIFFLIN-ST. JEOR: SCORE: 1293.72

## 2019-09-30 ENCOUNTER — RECORDS - HEALTHEAST (OUTPATIENT)
Dept: ADMINISTRATIVE | Facility: OTHER | Age: 69
End: 2019-09-30

## 2019-09-30 ENCOUNTER — COMMUNICATION - HEALTHEAST (OUTPATIENT)
Dept: FAMILY MEDICINE | Facility: CLINIC | Age: 69
End: 2019-09-30

## 2019-09-30 LAB
25(OH)D3 SERPL-MCNC: 51.7 NG/ML (ref 30–80)
TTG IGA SER-ACNC: 0.2 U/ML
TTG IGG SER-ACNC: <0.6 U/ML

## 2019-10-02 ENCOUNTER — COMMUNICATION - HEALTHEAST (OUTPATIENT)
Dept: FAMILY MEDICINE | Facility: CLINIC | Age: 69
End: 2019-10-02

## 2019-10-03 ENCOUNTER — AMBULATORY - HEALTHEAST (OUTPATIENT)
Dept: FAMILY MEDICINE | Facility: CLINIC | Age: 69
End: 2019-10-03

## 2019-10-08 ENCOUNTER — COMMUNICATION - HEALTHEAST (OUTPATIENT)
Dept: FAMILY MEDICINE | Facility: CLINIC | Age: 69
End: 2019-10-08

## 2019-10-09 ENCOUNTER — AMBULATORY - HEALTHEAST (OUTPATIENT)
Dept: FAMILY MEDICINE | Facility: CLINIC | Age: 69
End: 2019-10-09

## 2019-10-09 ENCOUNTER — COMMUNICATION - HEALTHEAST (OUTPATIENT)
Dept: FAMILY MEDICINE | Facility: CLINIC | Age: 69
End: 2019-10-09

## 2019-10-09 DIAGNOSIS — Z79.4 TYPE 2 DIABETES MELLITUS WITH DIABETIC NEUROPATHY, WITH LONG-TERM CURRENT USE OF INSULIN (H): ICD-10-CM

## 2019-10-09 DIAGNOSIS — E11.40 TYPE 2 DIABETES MELLITUS WITH DIABETIC NEUROPATHY, WITH LONG-TERM CURRENT USE OF INSULIN (H): ICD-10-CM

## 2019-10-10 ENCOUNTER — RECORDS - HEALTHEAST (OUTPATIENT)
Dept: HEALTH INFORMATION MANAGEMENT | Facility: CLINIC | Age: 69
End: 2019-10-10

## 2019-10-11 ENCOUNTER — RECORDS - HEALTHEAST (OUTPATIENT)
Dept: ADMINISTRATIVE | Facility: OTHER | Age: 69
End: 2019-10-11

## 2019-10-13 ENCOUNTER — COMMUNICATION - HEALTHEAST (OUTPATIENT)
Dept: EDUCATION SERVICES | Facility: CLINIC | Age: 69
End: 2019-10-13

## 2019-10-16 ENCOUNTER — COMMUNICATION - HEALTHEAST (OUTPATIENT)
Dept: SCHEDULING | Facility: CLINIC | Age: 69
End: 2019-10-16

## 2019-10-30 ENCOUNTER — OFFICE VISIT - HEALTHEAST (OUTPATIENT)
Dept: FAMILY MEDICINE | Facility: CLINIC | Age: 69
End: 2019-10-30

## 2019-10-30 DIAGNOSIS — Z79.4 TYPE 2 DIABETES MELLITUS WITH DIABETIC NEUROPATHY, WITH LONG-TERM CURRENT USE OF INSULIN (H): ICD-10-CM

## 2019-10-30 DIAGNOSIS — E78.49 OTHER HYPERLIPIDEMIA: ICD-10-CM

## 2019-10-30 DIAGNOSIS — I10 ESSENTIAL HYPERTENSION, BENIGN: ICD-10-CM

## 2019-10-30 DIAGNOSIS — E11.40 TYPE 2 DIABETES MELLITUS WITH DIABETIC NEUROPATHY, WITH LONG-TERM CURRENT USE OF INSULIN (H): ICD-10-CM

## 2019-10-30 DIAGNOSIS — E11.42 DIABETIC POLYNEUROPATHY ASSOCIATED WITH TYPE 2 DIABETES MELLITUS (H): ICD-10-CM

## 2019-10-30 DIAGNOSIS — M85.89 OSTEOPENIA OF MULTIPLE SITES: ICD-10-CM

## 2019-10-30 DIAGNOSIS — E11.319 DIABETIC RETINOPATHY ASSOCIATED WITH TYPE 2 DIABETES MELLITUS, MACULAR EDEMA PRESENCE UNSPECIFIED, UNSPECIFIED LATERALITY, UNSPECIFIED RETINOPATHY SEVERITY (H): ICD-10-CM

## 2019-10-30 DIAGNOSIS — H26.9 CATARACT OF BOTH EYES, UNSPECIFIED CATARACT TYPE: ICD-10-CM

## 2019-10-30 DIAGNOSIS — Z00.00 ROUTINE GENERAL MEDICAL EXAMINATION AT A HEALTH CARE FACILITY: ICD-10-CM

## 2019-10-30 ASSESSMENT — MIFFLIN-ST. JEOR: SCORE: 1268.62

## 2019-11-01 ENCOUNTER — COMMUNICATION - HEALTHEAST (OUTPATIENT)
Dept: EDUCATION SERVICES | Facility: CLINIC | Age: 69
End: 2019-11-01

## 2019-11-05 ENCOUNTER — COMMUNICATION - HEALTHEAST (OUTPATIENT)
Dept: SCHEDULING | Facility: CLINIC | Age: 69
End: 2019-11-05

## 2019-11-05 DIAGNOSIS — E11.40 TYPE 2 DIABETES MELLITUS WITH DIABETIC NEUROPATHY, WITH LONG-TERM CURRENT USE OF INSULIN (H): ICD-10-CM

## 2019-11-05 DIAGNOSIS — Z79.4 TYPE 2 DIABETES MELLITUS WITH DIABETIC NEUROPATHY, WITH LONG-TERM CURRENT USE OF INSULIN (H): ICD-10-CM

## 2019-11-09 ENCOUNTER — COMMUNICATION - HEALTHEAST (OUTPATIENT)
Dept: SCHEDULING | Facility: CLINIC | Age: 69
End: 2019-11-09

## 2019-11-11 ENCOUNTER — OFFICE VISIT - HEALTHEAST (OUTPATIENT)
Dept: FAMILY MEDICINE | Facility: CLINIC | Age: 69
End: 2019-11-11

## 2019-11-11 DIAGNOSIS — Z01.818 ENCOUNTER FOR PREOPERATIVE EXAMINATION FOR GENERAL SURGICAL PROCEDURE: ICD-10-CM

## 2019-11-11 DIAGNOSIS — E78.49 OTHER HYPERLIPIDEMIA: ICD-10-CM

## 2019-11-11 DIAGNOSIS — I10 ESSENTIAL HYPERTENSION, BENIGN: ICD-10-CM

## 2019-11-11 DIAGNOSIS — Z79.4 TYPE 2 DIABETES MELLITUS WITH DIABETIC NEUROPATHY, WITH LONG-TERM CURRENT USE OF INSULIN (H): ICD-10-CM

## 2019-11-11 DIAGNOSIS — E11.319 DIABETIC RETINOPATHY ASSOCIATED WITH TYPE 2 DIABETES MELLITUS, MACULAR EDEMA PRESENCE UNSPECIFIED, UNSPECIFIED LATERALITY, UNSPECIFIED RETINOPATHY SEVERITY (H): ICD-10-CM

## 2019-11-11 DIAGNOSIS — E11.42 DIABETIC POLYNEUROPATHY ASSOCIATED WITH TYPE 2 DIABETES MELLITUS (H): ICD-10-CM

## 2019-11-11 DIAGNOSIS — H26.9 CATARACT OF BOTH EYES, UNSPECIFIED CATARACT TYPE: ICD-10-CM

## 2019-11-11 DIAGNOSIS — E11.40 TYPE 2 DIABETES MELLITUS WITH DIABETIC NEUROPATHY, WITH LONG-TERM CURRENT USE OF INSULIN (H): ICD-10-CM

## 2019-11-11 ASSESSMENT — MIFFLIN-ST. JEOR: SCORE: 1275.09

## 2019-12-03 ENCOUNTER — COMMUNICATION - HEALTHEAST (OUTPATIENT)
Dept: FAMILY MEDICINE | Facility: CLINIC | Age: 69
End: 2019-12-03

## 2019-12-19 ENCOUNTER — OFFICE VISIT - HEALTHEAST (OUTPATIENT)
Dept: FAMILY MEDICINE | Facility: CLINIC | Age: 69
End: 2019-12-19

## 2019-12-19 ENCOUNTER — COMMUNICATION - HEALTHEAST (OUTPATIENT)
Dept: FAMILY MEDICINE | Facility: CLINIC | Age: 69
End: 2019-12-19

## 2019-12-19 DIAGNOSIS — E11.40 TYPE 2 DIABETES MELLITUS WITH DIABETIC NEUROPATHY, WITH LONG-TERM CURRENT USE OF INSULIN (H): ICD-10-CM

## 2019-12-19 DIAGNOSIS — E11.42 DIABETIC POLYNEUROPATHY ASSOCIATED WITH TYPE 2 DIABETES MELLITUS (H): ICD-10-CM

## 2019-12-19 DIAGNOSIS — E78.49 OTHER HYPERLIPIDEMIA: ICD-10-CM

## 2019-12-19 DIAGNOSIS — Z79.4 TYPE 2 DIABETES MELLITUS WITH DIABETIC NEUROPATHY, WITH LONG-TERM CURRENT USE OF INSULIN (H): ICD-10-CM

## 2019-12-19 DIAGNOSIS — I10 ESSENTIAL HYPERTENSION, BENIGN: ICD-10-CM

## 2019-12-19 LAB — HBA1C MFR BLD: 8.5 % (ref 3.5–6)

## 2019-12-19 ASSESSMENT — MIFFLIN-ST. JEOR: SCORE: 1271.8

## 2020-03-09 ENCOUNTER — COMMUNICATION - HEALTHEAST (OUTPATIENT)
Dept: FAMILY MEDICINE | Facility: CLINIC | Age: 70
End: 2020-03-09

## 2020-04-15 ENCOUNTER — RECORDS - HEALTHEAST (OUTPATIENT)
Dept: ADMINISTRATIVE | Facility: OTHER | Age: 70
End: 2020-04-15

## 2020-04-15 LAB — RETINOPATHY: NEGATIVE

## 2020-04-21 ENCOUNTER — COMMUNICATION - HEALTHEAST (OUTPATIENT)
Dept: FAMILY MEDICINE | Facility: CLINIC | Age: 70
End: 2020-04-21

## 2020-04-22 ENCOUNTER — RECORDS - HEALTHEAST (OUTPATIENT)
Dept: HEALTH INFORMATION MANAGEMENT | Facility: CLINIC | Age: 70
End: 2020-04-22

## 2020-05-11 ENCOUNTER — COMMUNICATION - HEALTHEAST (OUTPATIENT)
Dept: FAMILY MEDICINE | Facility: CLINIC | Age: 70
End: 2020-05-11

## 2020-05-12 ENCOUNTER — OFFICE VISIT - HEALTHEAST (OUTPATIENT)
Dept: FAMILY MEDICINE | Facility: CLINIC | Age: 70
End: 2020-05-12

## 2020-05-12 DIAGNOSIS — I10 ESSENTIAL HYPERTENSION, BENIGN: ICD-10-CM

## 2020-05-12 DIAGNOSIS — E78.49 OTHER HYPERLIPIDEMIA: ICD-10-CM

## 2020-05-12 DIAGNOSIS — E11.319 DIABETIC RETINOPATHY ASSOCIATED WITH TYPE 2 DIABETES MELLITUS, MACULAR EDEMA PRESENCE UNSPECIFIED, UNSPECIFIED LATERALITY, UNSPECIFIED RETINOPATHY SEVERITY (H): ICD-10-CM

## 2020-05-12 DIAGNOSIS — E11.40 TYPE 2 DIABETES MELLITUS WITH DIABETIC NEUROPATHY, WITH LONG-TERM CURRENT USE OF INSULIN (H): ICD-10-CM

## 2020-05-12 DIAGNOSIS — Z79.4 TYPE 2 DIABETES MELLITUS WITH DIABETIC NEUROPATHY, WITH LONG-TERM CURRENT USE OF INSULIN (H): ICD-10-CM

## 2020-05-13 ENCOUNTER — COMMUNICATION - HEALTHEAST (OUTPATIENT)
Dept: FAMILY MEDICINE | Facility: CLINIC | Age: 70
End: 2020-05-13

## 2020-05-13 DIAGNOSIS — Z79.4 TYPE 2 DIABETES MELLITUS WITH DIABETIC NEUROPATHY, WITH LONG-TERM CURRENT USE OF INSULIN (H): ICD-10-CM

## 2020-05-13 DIAGNOSIS — E11.40 TYPE 2 DIABETES MELLITUS WITH DIABETIC NEUROPATHY, WITH LONG-TERM CURRENT USE OF INSULIN (H): ICD-10-CM

## 2020-05-14 RX ORDER — LIRAGLUTIDE 6 MG/ML
1.8 INJECTION SUBCUTANEOUS DAILY
Qty: 9 SYRINGE | Refills: 4 | Status: SHIPPED | OUTPATIENT
Start: 2020-05-14 | End: 2021-08-06 | Stop reason: ALTCHOICE

## 2020-05-14 RX ORDER — INSULIN GLARGINE 100 [IU]/ML
24 INJECTION, SOLUTION SUBCUTANEOUS DAILY
Qty: 10 PEN | Refills: 4 | Status: SHIPPED | OUTPATIENT
Start: 2020-05-14 | End: 2021-09-07

## 2020-05-22 ENCOUNTER — AMBULATORY - HEALTHEAST (OUTPATIENT)
Dept: LAB | Facility: CLINIC | Age: 70
End: 2020-05-22

## 2020-05-22 DIAGNOSIS — E11.40 TYPE 2 DIABETES MELLITUS WITH DIABETIC NEUROPATHY, WITH LONG-TERM CURRENT USE OF INSULIN (H): ICD-10-CM

## 2020-05-22 DIAGNOSIS — E78.49 OTHER HYPERLIPIDEMIA: ICD-10-CM

## 2020-05-22 DIAGNOSIS — Z79.4 TYPE 2 DIABETES MELLITUS WITH DIABETIC NEUROPATHY, WITH LONG-TERM CURRENT USE OF INSULIN (H): ICD-10-CM

## 2020-05-22 LAB
ALBUMIN SERPL-MCNC: 3.9 G/DL (ref 3.5–5)
ALP SERPL-CCNC: 77 U/L (ref 45–120)
ALT SERPL W P-5'-P-CCNC: 18 U/L (ref 0–45)
ANION GAP SERPL CALCULATED.3IONS-SCNC: 9 MMOL/L (ref 5–18)
AST SERPL W P-5'-P-CCNC: 19 U/L (ref 0–40)
BILIRUB SERPL-MCNC: 0.3 MG/DL (ref 0–1)
BUN SERPL-MCNC: 13 MG/DL (ref 8–28)
CALCIUM SERPL-MCNC: 9.8 MG/DL (ref 8.5–10.5)
CHLORIDE BLD-SCNC: 100 MMOL/L (ref 98–107)
CHOLEST SERPL-MCNC: 185 MG/DL
CO2 SERPL-SCNC: 29 MMOL/L (ref 22–31)
CREAT SERPL-MCNC: 0.69 MG/DL (ref 0.6–1.1)
CREAT UR-MCNC: 51.4 MG/DL
FASTING STATUS PATIENT QL REPORTED: YES
GFR SERPL CREATININE-BSD FRML MDRD: >60 ML/MIN/1.73M2
GLUCOSE BLD-MCNC: 121 MG/DL (ref 70–125)
HBA1C MFR BLD: 8.6 % (ref 3.5–6)
HDLC SERPL-MCNC: 76 MG/DL
LDLC SERPL CALC-MCNC: 100 MG/DL
MICROALBUMIN UR-MCNC: 4.95 MG/DL (ref 0–1.99)
MICROALBUMIN/CREAT UR: 96.3 MG/G
POTASSIUM BLD-SCNC: 5.2 MMOL/L (ref 3.5–5)
PROT SERPL-MCNC: 6.3 G/DL (ref 6–8)
SODIUM SERPL-SCNC: 138 MMOL/L (ref 136–145)
TRIGL SERPL-MCNC: 44 MG/DL

## 2020-06-30 ENCOUNTER — AMBULATORY - HEALTHEAST (OUTPATIENT)
Dept: SCHEDULING | Facility: CLINIC | Age: 70
End: 2020-06-30

## 2020-06-30 DIAGNOSIS — M85.89 OSTEOPENIA OF MULTIPLE SITES: ICD-10-CM

## 2020-07-06 ENCOUNTER — COMMUNICATION - HEALTHEAST (OUTPATIENT)
Dept: FAMILY MEDICINE | Facility: CLINIC | Age: 70
End: 2020-07-06

## 2020-07-09 ENCOUNTER — COMMUNICATION - HEALTHEAST (OUTPATIENT)
Dept: FAMILY MEDICINE | Facility: CLINIC | Age: 70
End: 2020-07-09

## 2020-07-09 DIAGNOSIS — E11.9 DIABETES (H): ICD-10-CM

## 2020-08-17 ENCOUNTER — COMMUNICATION - HEALTHEAST (OUTPATIENT)
Dept: FAMILY MEDICINE | Facility: CLINIC | Age: 70
End: 2020-08-17

## 2020-09-09 ENCOUNTER — COMMUNICATION - HEALTHEAST (OUTPATIENT)
Dept: SCHEDULING | Facility: CLINIC | Age: 70
End: 2020-09-09

## 2020-09-09 ENCOUNTER — OFFICE VISIT - HEALTHEAST (OUTPATIENT)
Dept: FAMILY MEDICINE | Facility: CLINIC | Age: 70
End: 2020-09-09

## 2020-09-09 DIAGNOSIS — M25.562 ACUTE PAIN OF LEFT KNEE: ICD-10-CM

## 2020-09-09 DIAGNOSIS — M25.462 EFFUSION OF BURSA OF LEFT KNEE: ICD-10-CM

## 2020-09-10 ENCOUNTER — COMMUNICATION - HEALTHEAST (OUTPATIENT)
Dept: FAMILY MEDICINE | Facility: CLINIC | Age: 70
End: 2020-09-10

## 2020-09-14 ENCOUNTER — RECORDS - HEALTHEAST (OUTPATIENT)
Dept: ADMINISTRATIVE | Facility: OTHER | Age: 70
End: 2020-09-14

## 2020-09-14 LAB — RETINOPATHY: POSITIVE

## 2020-09-15 ENCOUNTER — COMMUNICATION - HEALTHEAST (OUTPATIENT)
Dept: FAMILY MEDICINE | Facility: CLINIC | Age: 70
End: 2020-09-15

## 2020-09-15 DIAGNOSIS — E11.9 DIABETES (H): ICD-10-CM

## 2020-09-16 ENCOUNTER — RECORDS - HEALTHEAST (OUTPATIENT)
Dept: ADMINISTRATIVE | Facility: OTHER | Age: 70
End: 2020-09-16

## 2020-09-17 ENCOUNTER — RECORDS - HEALTHEAST (OUTPATIENT)
Dept: HEALTH INFORMATION MANAGEMENT | Facility: CLINIC | Age: 70
End: 2020-09-17

## 2020-09-21 ENCOUNTER — COMMUNICATION - HEALTHEAST (OUTPATIENT)
Dept: FAMILY MEDICINE | Facility: CLINIC | Age: 70
End: 2020-09-21

## 2020-09-21 DIAGNOSIS — M85.89 OSTEOPENIA OF MULTIPLE SITES: ICD-10-CM

## 2020-09-22 RX ORDER — ALENDRONATE SODIUM 70 MG/1
70 TABLET ORAL
Qty: 12 TABLET | Refills: 3 | Status: SHIPPED | OUTPATIENT
Start: 2020-09-22 | End: 2021-11-08

## 2020-09-24 ENCOUNTER — COMMUNICATION - HEALTHEAST (OUTPATIENT)
Dept: FAMILY MEDICINE | Facility: CLINIC | Age: 70
End: 2020-09-24

## 2020-09-24 ENCOUNTER — RECORDS - HEALTHEAST (OUTPATIENT)
Dept: ADMINISTRATIVE | Facility: OTHER | Age: 70
End: 2020-09-24

## 2020-09-26 ENCOUNTER — COMMUNICATION - HEALTHEAST (OUTPATIENT)
Dept: SCHEDULING | Facility: CLINIC | Age: 70
End: 2020-09-26

## 2020-10-01 ENCOUNTER — COMMUNICATION - HEALTHEAST (OUTPATIENT)
Dept: SCHEDULING | Facility: CLINIC | Age: 70
End: 2020-10-01

## 2020-10-02 ENCOUNTER — OFFICE VISIT - HEALTHEAST (OUTPATIENT)
Dept: FAMILY MEDICINE | Facility: CLINIC | Age: 70
End: 2020-10-02

## 2020-10-02 DIAGNOSIS — E11.40 TYPE 2 DIABETES MELLITUS WITH DIABETIC NEUROPATHY, WITH LONG-TERM CURRENT USE OF INSULIN (H): ICD-10-CM

## 2020-10-02 DIAGNOSIS — Z79.4 TYPE 2 DIABETES MELLITUS WITH DIABETIC NEUROPATHY, WITH LONG-TERM CURRENT USE OF INSULIN (H): ICD-10-CM

## 2020-10-02 DIAGNOSIS — R53.81 MALAISE: ICD-10-CM

## 2020-10-02 LAB
ALBUMIN SERPL-MCNC: 3.9 G/DL (ref 3.5–5)
ALBUMIN UR-MCNC: NEGATIVE MG/DL
ALP SERPL-CCNC: 76 U/L (ref 45–120)
ALT SERPL W P-5'-P-CCNC: 15 U/L (ref 0–45)
ANION GAP SERPL CALCULATED.3IONS-SCNC: 9 MMOL/L (ref 5–18)
APPEARANCE UR: CLEAR
AST SERPL W P-5'-P-CCNC: 18 U/L (ref 0–40)
BASOPHILS # BLD AUTO: 0.1 THOU/UL (ref 0–0.2)
BASOPHILS NFR BLD AUTO: 2 % (ref 0–2)
BILIRUB SERPL-MCNC: 0.4 MG/DL (ref 0–1)
BILIRUB UR QL STRIP: NEGATIVE
BUN SERPL-MCNC: 8 MG/DL (ref 8–28)
C REACTIVE PROTEIN LHE: <0.1 MG/DL (ref 0–0.8)
CALCIUM SERPL-MCNC: 9.6 MG/DL (ref 8.5–10.5)
CHLORIDE BLD-SCNC: 95 MMOL/L (ref 98–107)
CO2 SERPL-SCNC: 26 MMOL/L (ref 22–31)
COLOR UR AUTO: YELLOW
CREAT SERPL-MCNC: 0.74 MG/DL (ref 0.6–1.1)
EOSINOPHIL # BLD AUTO: 0.1 THOU/UL (ref 0–0.4)
EOSINOPHIL NFR BLD AUTO: 2 % (ref 0–6)
ERYTHROCYTE [DISTWIDTH] IN BLOOD BY AUTOMATED COUNT: 15.1 % (ref 11–14.5)
ERYTHROCYTE [SEDIMENTATION RATE] IN BLOOD BY WESTERGREN METHOD: 8 MM/HR (ref 0–20)
GFR SERPL CREATININE-BSD FRML MDRD: >60 ML/MIN/1.73M2
GLUCOSE BLD-MCNC: 189 MG/DL (ref 70–125)
GLUCOSE UR STRIP-MCNC: NEGATIVE MG/DL
HBA1C MFR BLD: 8.3 %
HCT VFR BLD AUTO: 39.1 % (ref 35–47)
HGB BLD-MCNC: 12.9 G/DL (ref 12–16)
HGB UR QL STRIP: NEGATIVE
IMM GRANULOCYTES # BLD: 0 THOU/UL
IMM GRANULOCYTES NFR BLD: 0 %
KETONES UR STRIP-MCNC: NEGATIVE MG/DL
LDH SERPL L TO P-CCNC: 182 U/L (ref 125–220)
LEUKOCYTE ESTERASE UR QL STRIP: NEGATIVE
LYMPHOCYTES # BLD AUTO: 1.2 THOU/UL (ref 0.8–4.4)
LYMPHOCYTES NFR BLD AUTO: 23 % (ref 20–40)
MCH RBC QN AUTO: 29.9 PG (ref 27–34)
MCHC RBC AUTO-ENTMCNC: 33 G/DL (ref 32–36)
MCV RBC AUTO: 91 FL (ref 80–100)
MONOCYTES # BLD AUTO: 0.6 THOU/UL (ref 0–0.9)
MONOCYTES NFR BLD AUTO: 11 % (ref 2–10)
NEUTROPHILS # BLD AUTO: 3.2 THOU/UL (ref 2–7.7)
NEUTROPHILS NFR BLD AUTO: 61 % (ref 50–70)
NITRATE UR QL: NEGATIVE
PH UR STRIP: 7 [PH] (ref 5–8)
PLATELET # BLD AUTO: 320 THOU/UL (ref 140–440)
PMV BLD AUTO: 12 FL (ref 8.5–12.5)
POTASSIUM BLD-SCNC: 5.2 MMOL/L (ref 3.5–5)
PROT SERPL-MCNC: 6.2 G/DL (ref 6–8)
RBC # BLD AUTO: 4.32 MILL/UL (ref 3.8–5.4)
SODIUM SERPL-SCNC: 130 MMOL/L (ref 136–145)
SP GR UR STRIP: 1.01 (ref 1–1.03)
UROBILINOGEN UR STRIP-ACNC: NORMAL
WBC: 5.1 THOU/UL (ref 4–11)

## 2020-10-02 ASSESSMENT — MIFFLIN-ST. JEOR: SCORE: 1261.25

## 2020-10-06 ENCOUNTER — COMMUNICATION - HEALTHEAST (OUTPATIENT)
Dept: FAMILY MEDICINE | Facility: CLINIC | Age: 70
End: 2020-10-06

## 2020-10-09 ENCOUNTER — COMMUNICATION - HEALTHEAST (OUTPATIENT)
Dept: FAMILY MEDICINE | Facility: CLINIC | Age: 70
End: 2020-10-09

## 2020-10-13 ENCOUNTER — AMBULATORY - HEALTHEAST (OUTPATIENT)
Dept: FAMILY MEDICINE | Facility: CLINIC | Age: 70
End: 2020-10-13

## 2020-10-13 DIAGNOSIS — R11.0 POSTPRANDIAL NAUSEA: ICD-10-CM

## 2020-10-13 DIAGNOSIS — R10.30 LOWER ABDOMINAL PAIN: ICD-10-CM

## 2020-10-16 ENCOUNTER — COMMUNICATION - HEALTHEAST (OUTPATIENT)
Dept: FAMILY MEDICINE | Facility: CLINIC | Age: 70
End: 2020-10-16

## 2020-10-16 DIAGNOSIS — E11.40 TYPE 2 DIABETES MELLITUS WITH DIABETIC NEUROPATHY, WITH LONG-TERM CURRENT USE OF INSULIN (H): ICD-10-CM

## 2020-10-16 DIAGNOSIS — I10 ESSENTIAL HYPERTENSION, BENIGN: ICD-10-CM

## 2020-10-16 DIAGNOSIS — Z79.4 TYPE 2 DIABETES MELLITUS WITH DIABETIC NEUROPATHY, WITH LONG-TERM CURRENT USE OF INSULIN (H): ICD-10-CM

## 2020-10-18 RX ORDER — GABAPENTIN 300 MG/1
300 CAPSULE ORAL AT BEDTIME
Qty: 90 CAPSULE | Refills: 3 | Status: SHIPPED | OUTPATIENT
Start: 2020-10-18 | End: 2021-10-01

## 2020-10-18 RX ORDER — METOPROLOL SUCCINATE 50 MG/1
50 TABLET, EXTENDED RELEASE ORAL DAILY
Qty: 90 TABLET | Refills: 3 | Status: SHIPPED | OUTPATIENT
Start: 2020-10-18 | End: 2021-11-08

## 2020-11-16 ENCOUNTER — COMMUNICATION - HEALTHEAST (OUTPATIENT)
Dept: FAMILY MEDICINE | Facility: CLINIC | Age: 70
End: 2020-11-16

## 2020-12-28 ENCOUNTER — COMMUNICATION - HEALTHEAST (OUTPATIENT)
Dept: ADMINISTRATIVE | Facility: CLINIC | Age: 70
End: 2020-12-28

## 2020-12-28 DIAGNOSIS — I10 ESSENTIAL HYPERTENSION, BENIGN: ICD-10-CM

## 2021-01-04 ENCOUNTER — COMMUNICATION - HEALTHEAST (OUTPATIENT)
Dept: ADMINISTRATIVE | Facility: CLINIC | Age: 71
End: 2021-01-04

## 2021-01-04 DIAGNOSIS — I10 ESSENTIAL HYPERTENSION, BENIGN: ICD-10-CM

## 2021-01-07 ENCOUNTER — OFFICE VISIT - HEALTHEAST (OUTPATIENT)
Dept: FAMILY MEDICINE | Facility: CLINIC | Age: 71
End: 2021-01-07

## 2021-01-07 DIAGNOSIS — E11.649 UNCONTROLLED TYPE 2 DIABETES MELLITUS WITH HYPOGLYCEMIA WITHOUT COMA (H): ICD-10-CM

## 2021-01-07 DIAGNOSIS — R07.89 CHEST WALL PAIN: ICD-10-CM

## 2021-03-08 ENCOUNTER — COMMUNICATION - HEALTHEAST (OUTPATIENT)
Dept: INTERNAL MEDICINE | Facility: CLINIC | Age: 71
End: 2021-03-08

## 2021-03-08 DIAGNOSIS — I10 ESSENTIAL HYPERTENSION, BENIGN: ICD-10-CM

## 2021-03-08 RX ORDER — AMLODIPINE BESYLATE 5 MG/1
5 TABLET ORAL DAILY
Qty: 90 TABLET | Refills: 1 | Status: SHIPPED | OUTPATIENT
Start: 2021-03-08 | End: 2021-07-26

## 2021-03-15 ENCOUNTER — OFFICE VISIT - HEALTHEAST (OUTPATIENT)
Dept: FAMILY MEDICINE | Facility: CLINIC | Age: 71
End: 2021-03-15

## 2021-03-15 DIAGNOSIS — R25.2 LEG CRAMPS: ICD-10-CM

## 2021-03-15 DIAGNOSIS — I10 ESSENTIAL HYPERTENSION, BENIGN: ICD-10-CM

## 2021-03-15 DIAGNOSIS — E11.649 UNCONTROLLED TYPE 2 DIABETES MELLITUS WITH HYPOGLYCEMIA WITHOUT COMA (H): ICD-10-CM

## 2021-03-15 DIAGNOSIS — E11.9 DIABETES (H): ICD-10-CM

## 2021-03-15 LAB
ANION GAP SERPL CALCULATED.3IONS-SCNC: 13 MMOL/L (ref 5–18)
BUN SERPL-MCNC: 14 MG/DL (ref 8–28)
CALCIUM SERPL-MCNC: 10.1 MG/DL (ref 8.5–10.5)
CHLORIDE BLD-SCNC: 96 MMOL/L (ref 98–107)
CHOLEST SERPL-MCNC: 190 MG/DL
CO2 SERPL-SCNC: 26 MMOL/L (ref 22–31)
CREAT SERPL-MCNC: 0.77 MG/DL (ref 0.6–1.1)
CREAT UR-MCNC: 14.1 MG/DL
FASTING STATUS PATIENT QL REPORTED: NO
GFR SERPL CREATININE-BSD FRML MDRD: >60 ML/MIN/1.73M2
GLUCOSE BLD-MCNC: 269 MG/DL (ref 70–125)
HBA1C MFR BLD: 9.6 %
HDLC SERPL-MCNC: 75 MG/DL
LDLC SERPL CALC-MCNC: 100 MG/DL
MAGNESIUM SERPL-MCNC: 1.7 MG/DL (ref 1.8–2.6)
MICROALBUMIN UR-MCNC: <0.5 MG/DL (ref 0–1.99)
MICROALBUMIN/CREAT UR: NORMAL MG/G{CREAT}
POTASSIUM BLD-SCNC: 5.2 MMOL/L (ref 3.5–5)
SODIUM SERPL-SCNC: 135 MMOL/L (ref 136–145)
TRIGL SERPL-MCNC: 74 MG/DL

## 2021-03-22 ENCOUNTER — COMMUNICATION - HEALTHEAST (OUTPATIENT)
Dept: FAMILY MEDICINE | Facility: CLINIC | Age: 71
End: 2021-03-22

## 2021-03-25 ENCOUNTER — COMMUNICATION - HEALTHEAST (OUTPATIENT)
Dept: FAMILY MEDICINE | Facility: CLINIC | Age: 71
End: 2021-03-25

## 2021-03-25 DIAGNOSIS — E78.49 OTHER HYPERLIPIDEMIA: ICD-10-CM

## 2021-04-19 ENCOUNTER — COMMUNICATION - HEALTHEAST (OUTPATIENT)
Dept: FAMILY MEDICINE | Facility: CLINIC | Age: 71
End: 2021-04-19

## 2021-04-19 DIAGNOSIS — E78.49 OTHER HYPERLIPIDEMIA: ICD-10-CM

## 2021-04-25 ENCOUNTER — COMMUNICATION - HEALTHEAST (OUTPATIENT)
Dept: SCHEDULING | Facility: CLINIC | Age: 71
End: 2021-04-25

## 2021-04-26 ENCOUNTER — AMBULATORY - HEALTHEAST (OUTPATIENT)
Dept: INTERNAL MEDICINE | Facility: CLINIC | Age: 71
End: 2021-04-26

## 2021-04-26 ENCOUNTER — AMBULATORY - HEALTHEAST (OUTPATIENT)
Dept: LAB | Facility: CLINIC | Age: 71
End: 2021-04-26

## 2021-04-26 ENCOUNTER — COMMUNICATION - HEALTHEAST (OUTPATIENT)
Dept: INTERNAL MEDICINE | Facility: CLINIC | Age: 71
End: 2021-04-26

## 2021-04-26 DIAGNOSIS — E78.49 OTHER HYPERLIPIDEMIA: ICD-10-CM

## 2021-04-26 LAB
CHOLEST SERPL-MCNC: 161 MG/DL
FASTING STATUS PATIENT QL REPORTED: YES
HDLC SERPL-MCNC: 69 MG/DL
LDLC SERPL CALC-MCNC: 81 MG/DL
TRIGL SERPL-MCNC: 55 MG/DL

## 2021-04-26 RX ORDER — EZETIMIBE 10 MG/1
10 TABLET ORAL DAILY
Qty: 90 TABLET | Refills: 1 | Status: SHIPPED | OUTPATIENT
Start: 2021-04-26 | End: 2021-09-20

## 2021-05-17 ENCOUNTER — COMMUNICATION - HEALTHEAST (OUTPATIENT)
Dept: INTERNAL MEDICINE | Facility: CLINIC | Age: 71
End: 2021-05-17

## 2021-05-17 DIAGNOSIS — E11.40 TYPE 2 DIABETES MELLITUS WITH DIABETIC NEUROPATHY, WITH LONG-TERM CURRENT USE OF INSULIN (H): ICD-10-CM

## 2021-05-17 DIAGNOSIS — Z79.4 TYPE 2 DIABETES MELLITUS WITH DIABETIC NEUROPATHY, WITH LONG-TERM CURRENT USE OF INSULIN (H): ICD-10-CM

## 2021-05-17 RX ORDER — GLUCOSAMINE HCL/CHONDROITIN SU 500-400 MG
CAPSULE ORAL
Qty: 300 STRIP | Refills: 3 | Status: SHIPPED | OUTPATIENT
Start: 2021-05-17 | End: 2022-09-26

## 2021-05-27 NOTE — TELEPHONE ENCOUNTER
A new Prescription needed for VICTOZA    1.8mg daily. 90 day supply. 9 pens.      Patient calling. Wants to make a sure a NEW RX is sent to [facility]   For     A  90 day supply . Of 9 pens. 1.8mg daily.    Please send asap.    Patient reports she is in the donut hole, and cannot afford to get less than this RX.  Please issue new RX for #90 days.    Princess Robertson RN  [facility] Connection Triage/refill nurse

## 2021-05-27 NOTE — TELEPHONE ENCOUNTER
"Who is calling: Patient  Reason for Call: Requesting  90 day supply of Victoza-( last RX IS not 90 day supply - ) PLEASE SEND new RX  to [facility], THIS IS VERY expensive, PLEASE make sure RX is correct for 90 day supply with refills   Date of last appointment with primary care:  3/13/19    Has the patient been recently seen:  Yes  Okay to leave a detailed message: Yes        liraglutide (VICTOZA) 0.6 mg/0.1 mL (18 mg/3 mL) PnIj injection 6 mL 11 3/18/2019     Sig - Route: Inject 1.8 mg under the skin daily. With or without food. - Subcutaneous    Sent to pharmacy as: liraglutide (VICTOZA) 0.6 mg/0.1 mL (18 mg/3 mL) PnIj injection    Notes to Pharmacy: Patient requesting \"2 pack\" only    E-Prescribing Status: Receipt confirmed by pharmacy (3/18/2019  1:38 PM CDT        "

## 2021-05-27 NOTE — TELEPHONE ENCOUNTER
Refill Approved    Rx renewed per Medication Renewal Policy. Medication was last renewed on 03/18/2019.    Mohsen Madsen Connection Triage/Med Refill 4/11/2019     Requested Prescriptions   Pending Prescriptions Disp Refills     liraglutide (VICTOZA) 0.6 mg/0.1 mL (18 mg/3 mL) PnIj injection 6 mL 11     Sig: Inject 1.8 mg under the skin daily. With or without food.       Insulin/GLP-1 Refill Protocol Passed - 4/11/2019 12:06 PM        Passed - Visit with PCP or prescribing provider visit in last 6 months     Last office visit with prescriber/PCP: Visit date not found OR same dept: Visit date not found OR same specialty: Visit date not found Last physical: Visit date not found Last MTM visit: Visit date not found     Next appt within 3 mo: Visit date not found  Next physical within 3 mo: Visit date not found  Prescriber OR PCP: Princess Robertson RN  Last diagnosis associated with med order: 1. Type 2 diabetes mellitus, uncontrolled (H)  - liraglutide (VICTOZA) 0.6 mg/0.1 mL (18 mg/3 mL) PnIj injection; Inject 1.8 mg under the skin daily. With or without food.  Dispense: 6 mL; Refill: 11    If protocol passes may refill for 6 months if within 3 months of last provider visit (or a total of 9 months).              Passed - A1C in last 6 months     Hemoglobin A1c   Date Value Ref Range Status   03/06/2019 7.5 (H) 3.5 - 6.0 % Final               Passed - Microalbumin in last year     Microalbumin, Random Urine   Date Value Ref Range Status   08/15/2018 <0.50 0.00 - 1.99 mg/dL Final                  Passed - Blood pressure in last year     BP Readings from Last 1 Encounters:   03/13/19 130/76             Passed - Creatinine done in last year     Creatinine   Date Value Ref Range Status   03/06/2019 0.67 0.60 - 1.10 mg/dL Final

## 2021-05-27 NOTE — TELEPHONE ENCOUNTER
RN cannot approve Refill Request    RN can NOT refill this medication med is not covered by policy/route to provider. Last office visit: Visit date not found Last Physical: Visit date not found Last MTM visit: Visit date not found Last visit same specialty: Visit date not found.  Next visit within 3 mo: Visit date not found  Next physical within 3 mo: Visit date not found      Mohsen Madsen Connection Triage/Med Refill 4/11/2019    Requested Prescriptions   Pending Prescriptions Disp Refills     liraglutide (VICTOZA) 0.6 mg/0.1 mL (18 mg/3 mL) PnIj injection 6 mL 11     Sig: Inject 1.8 mg under the skin daily. With or without food.       Insulin/GLP-1 Refill Protocol Passed - 4/11/2019 12:06 PM        Passed - Visit with PCP or prescribing provider visit in last 6 months     Last office visit with prescriber/PCP: Visit date not found OR same dept: Visit date not found OR same specialty: Visit date not found Last physical: Visit date not found Last MTM visit: Visit date not found     Next appt within 3 mo: Visit date not found  Next physical within 3 mo: Visit date not found  Prescriber OR PCP: Princess Robertson RN  Last diagnosis associated with med order: 1. Type 2 diabetes mellitus, uncontrolled (H)  - liraglutide (VICTOZA) 0.6 mg/0.1 mL (18 mg/3 mL) PnIj injection; Inject 1.8 mg under the skin daily. With or without food.  Dispense: 6 mL; Refill: 11    If protocol passes may refill for 6 months if within 3 months of last provider visit (or a total of 9 months).              Passed - A1C in last 6 months     Hemoglobin A1c   Date Value Ref Range Status   03/06/2019 7.5 (H) 3.5 - 6.0 % Final               Passed - Microalbumin in last year     Microalbumin, Random Urine   Date Value Ref Range Status   08/15/2018 <0.50 0.00 - 1.99 mg/dL Final                  Passed - Blood pressure in last year     BP Readings from Last 1 Encounters:   03/13/19 130/76             Passed - Creatinine done in last year      Creatinine   Date Value Ref Range Status   03/06/2019 0.67 0.60 - 1.10 mg/dL Final

## 2021-05-28 NOTE — TELEPHONE ENCOUNTER
Orders being requested: hemoglobin A1c and urine protein test, per patient if this test is done she will get a $25 gift card from her insurance company. Patient is asking should she fast for the hemoglobin A1c test? Please advise!  Reason service is needed/diagnosis: follow up  When are orders needed by: asap  Where to send Orders: enter in Epic and call patient  Okay to leave detailed message?  Yes

## 2021-05-28 NOTE — TELEPHONE ENCOUNTER
Called and spoke with Nancy. She already has a diabetes f/u scheduled with [facility] on 06/17/19. Will address this and get labs at that time.    Arabella Velasquez, CMA

## 2021-05-29 NOTE — PROGRESS NOTES
Assessment / Impression     1. Type 2 diabetes mellitus with diabetic neuropathy, with long-term current use of insulin (H)  Glycosylated Hemoglobin A1c    Microalbumin, Random Urine    Ambulatory referral to Diabetes Education Program (CDE)    CANCELED: Microalbumin, Random Urine   2. Diabetic polyneuropathy associated with type 2 diabetes mellitus (H)     3. Benign Essential Hypertension  amLODIPine (NORVASC) 10 MG tablet   4. Diabetic retinopathy associated with type 2 diabetes mellitus, macular edema presence unspecified, unspecified laterality, unspecified retinopathy severity (H)     5. Cramps of lower extremity  Vitamin B12    Magnesium    Comprehensive Metabolic Panel   6. Visit for screening mammogram  Mammo Screening Bilateral   7. Screen for colon cancer  Ambulatory referral for Colonoscopy   8. Screening cholesterol level  Lipid Cascade   9. Screening for osteoporosis     10. Abnormal bone density screening  DXA Bone Density Scan         Plan:     Her hemoglobin A1c came down to 7.3%.  She is going to make a slight decrease in the Basaglar to 25 units daily and start taking the Victoza earlier in the day with the hopes of minimizing overnight hypoglycemic episodes that she has been struggling with.  I recommended that she start doing postprandial glucose checks a few times a week to get a better sense of what her blood glucose levels are like throughout the day.  Her blood pressure is running high today.  We decided to increase the amlodipine to 10 mg daily.  She is going to return to the clinic for nurse only blood pressure check in 2-3 weeks.  We are going to check the above labs and she will be notified of the results of they are available.  I encouraged her to stay well-hydrated with the hope of minimizing cramping symptoms.  She may continue gabapentin which continues to help her neuropathy symptoms.  I ordered a mammogram, colonoscopy and DEXA scan.    Subjective:      HPI: Nancy Hughes is a 68  "y.o. female who presents to the clinic for a follow-up visit.  She has a medical history significant for type 2 diabetes, hypertension, peripheral neuropathy, diabetic retinopathy and osteopenia.  She reports that her blood sugars have been running insistently below 100.  She describes frequently having overnight hypoglycemic episodes down into the 70s-80s.  She will occasionally drink a small amount of pop or juice to help with this.  She is currently taking Basaglar 26 units in the morning toes of 1.8 mg 3 in the afternoon and metformin 1000 mg twice daily.  She reports that taking the Victoza and Lantus at the same time in the morning was causing GI side effects.  She is also concerned about weight gain as she getting the diabetes under better control.  She has difficult exercising due to the peripheral neuropathy symptoms.  She reports that gabapentin helpful for this.  She saw endocrinology on 3/13/2019 and her last A1c was 7.5%.  She had a normal BMP at that time        Review of Systems  All other systems reviewed and are negative.     Social History     Tobacco Use   Smoking Status Former Smoker     Types: Cigarettes   Smokeless Tobacco Never Used       Family History   Problem Relation Age of Onset     Breast cancer Mother 81     Colon cancer Brother 64     Breast cancer Paternal Aunt        Objective:     /84 (Patient Site: Left Arm, Patient Position: Sitting, Cuff Size: Adult Regular)   Pulse 68   Temp 98.9  F (37.2  C) (Oral)   Ht 5' 4.5\" (1.638 m)   Wt 168 lb 1.6 oz (76.2 kg)   BMI 28.41 kg/m    Physical Examination: General appearance - alert, well appearing, and in no distress  Eyes: pupils equal and reactive, extraocular eye movements intact  Mouth: mucous membranes moist, pharynx normal without lesions  Neck: supple, no significant adenopathy  Lungs: clear to auscultation, no wheezes, rales or rhonchi, symmetric air entry  Heart: normal rate, regular rhythm, normal S1, S2, no murmurs, " "rubs, clicks or gallops  Abdomen: soft, nontender, nondistended, no masses or organomegaly  Neurological: alert, oriented, normal speech, no focal findings or movement disorder noted.  Monofilament foot exam is normal bilaterally  Extremities: No edema, no clubbing or cyanosis.  No lesions or ulcers on her feet.  Psychiatric: Normal affect. Does not appear anxious or depressed.    Recent Results (from the past 168 hour(s))   Glycosylated Hemoglobin A1c   Result Value Ref Range    Hemoglobin A1c 7.3 (H) 3.5 - 6.0 %       Current Outpatient Medications   Medication Sig     aspirin 81 mg TbEF Take 1 tablet by mouth daily.     aspirin-acetaminophen-caffeine (EXCEDRIN MIGRAINE) 250-250-65 mg per tablet Take 1 tablet by mouth every 6 (six) hours as needed for pain.     BD ULTRA-FINE SHERRY PEN NEEDLES 32 gauge x 5/32\" Ndle USE TWICE A DAY WITH INSULIN PEN     blood glucose test strips Use 1 each As Directed as needed. Dispense brand per patient's insurance at pharmacy discretion.     calcium citrate-vitamin D (CALCIUM CITRATE +) 315-200 mg-unit per tablet Take 1 tablet by mouth 2 (two) times a day.     DOCUSATE CALCIUM (STOOL SOFTENER ORAL) Take 100 mg by mouth daily.      gabapentin (NEURONTIN) 300 MG capsule Take 1 capsule (300 mg total) by mouth at bedtime.     insulin glargine (LANTUS; BASAGLAR) 100 unit/mL (3 mL) pen Use daily in the morning as directed, up to 30 units a day     lancets 30 gauge Misc Use 1 each As Directed 3 (three) times a day. Use one lancet with your Verio meter to check blood sugar as directed     liraglutide (VICTOZA) 0.6 mg/0.1 mL (18 mg/3 mL) PnIj injection Inject 1.8 mg under the skin daily for 90 doses. With or without food.     magnesium oxide 250 mg Tab Take 250 mg by mouth daily.     metFORMIN (GLUCOPHAGE) 1000 MG tablet Take 1 tablet (1,000 mg total) by mouth 2 (two) times a day with meals.     MULTIVITAMIN (MULTIPLE VITAMIN ESSENTIAL ORAL) Take 1 tablet by mouth daily.      pen needle, " "diabetic (BD ULTRA-FINE SHERRY PEN NEEDLE) 32 gauge x 5/32\" Ndle Use twice daily with insulin     psyllium with dextrose (FIBER) powder Take by mouth 3 (three) times a day. 3 scoops every morning     amLODIPine (NORVASC) 10 MG tablet Take 1 tablet (10 mg total) by mouth daily.     "

## 2021-05-30 VITALS — WEIGHT: 171.1 LBS | BODY MASS INDEX: 29.1 KG/M2

## 2021-05-30 VITALS — HEIGHT: 64 IN | WEIGHT: 163.8 LBS | BODY MASS INDEX: 27.96 KG/M2

## 2021-05-30 VITALS — BODY MASS INDEX: 29.24 KG/M2 | HEIGHT: 64 IN | WEIGHT: 171.25 LBS

## 2021-05-30 NOTE — PROGRESS NOTES
[facility] Daily Progress     Patient Name: Nancy Hughes  Date: 7/19/2019  Visit #: 2  PTA visit #: 1  Referral Diagnosis:  Referring provider: Aaron Toledo  Visit Diagnosis:     ICD-10-CM    1. Diabetic polyneuropathy associated with type 2 diabetes mellitus (H) E11.42    2. Cramp of both lower extremities R25.2    3. Cramps of lower extremity R25.2          The patient has a significantly tight left lower abdominals and right adductors causing a left high pubic tubercle.  She has significant tightness throughout her lower extremities. She also had a rotation in her lumbar spine.  Will continue to assess patient's LE and spine. Assess gait.      Plan for next visit: reassess how she is doing with her stretches and see if there are any questions with the massage technique.  Recheck her lower abdomen and pubic tubercles as well as her spine position.  Check the spinal mobility and check dural mobilization and check her psoas and IT band as well as repeated lumbar spine exercises.  Ask if she has any numbness tingling and or burning.  Check sensation if there is any issues.  Add in any desensitization techniques if needed if she is hyper sensitive anywhere.  Add in hip adductor stretching as able to.  Assessment:   Patient to return in 2 weeks and see how she is doing with her cramping.  She is to try a raised toilet seat and see if that helps with her cramping when sitting on a toilet in the AM.  She was able to progress to stretching her adductors when supine today.  Patient is benefitting from skilled physical therapy and is making steady progress toward functional goals.  Patient is appropriate to continue with skilled physical therapy intervention, as indicated by initial plan of care.    Goal Status:  Pt. will demonstrate/verbalize independence in self-management of condition in : 6 weeks;12 weeks  Pt. will be independent with home exercise program in : 6 weeks;12 weeks  Pt. will have  improved quality of sleep: waking less times/night;with less pain;Comment;in 12 weeks  Comment:: The patient will notice less cramping when going to the bathroom at night only 3 times per week.  Patient will sit: 30 minutes;for eating;for watching TV;with less pain;with less difficultty;in 6 weeks;in 12 weeks        Plan / Patient Education:   Plan for next visit: reassess how she is doing with her stretches and see if there are any questions with the massage technique.  Recheck her lower abdomen and pubic tubercles as well as her spine position.  Check the spinal mobility and check dural mobilization and check her psoas and IT band as well as repeated lumbar spine exercises.  Ask if she has any numbness tingling and or burning.  Check sensation if there is any issues.  Add in any desensitization techniques if needed if she is hyper sensitive anywhere.  Add in hip adductor stretching as able to.  Continue with initial plan of care.  Progress with home program as tolerated.    Subjective:     Pt has thigh pain when sitting on the toilet in the AM.       Pain Ratin-1 pt states the pain is intermittent and will occasionally have sharp pain when she gets the cramping.        Objective:   L4,3,2,1 right rotated today in standing   Reviewed HEP re instructed in how to do the seated hamstring stretch.  Tight psoas on the right more than the left she also has tightness in her adductors and quads bilaterally did STM, MFR to help with this today.       Exercises:  Exercise #1: standing gastroc stretch using a towel roll under the ball of her foot.  Comment #1: 1-3 minutes until the patient no longer feels a stretch  Exercise #2: sitting hamstring stretch sitting weith the fot releaxed  and stretch 30 seconds to 3 minutes until the muscles relax.  Exercise #3: patient to hold the muscle until it relaxes 3-5 minutes or more instructed the patient's  to do this today.  Exercise #4: bent knee fall outs  Comment #4: x10  B  Exercise #5: standing or SL ITB stretch  Comment #5: 1' B        Treatment Today     TREATMENT MINUTES COMMENTS   Evaluation     Self-care/ Home management     Manual therapy 20 Tight psoas on the right more than the left she also has tightness in her adductors and quads bilaterally did STM, MFR to help with this today.          Neuromuscular Re-education     Therapeutic Activity     Therapeutic Exercises 10 Added wall circles(hula hoop) and supine hip adductor stretch.    Gait training     Modality__________________                Total 30    Blank areas are intentional and mean the treatment did not include these items.       Claudine NierSimba  7/19/2019

## 2021-05-30 NOTE — PROGRESS NOTES
Assessment: Nancy Hughes is a  69 y.o.who presents today for  A consult regarding her diabetes management and review of BG with possible placement of diagnostic CGM with A1c presently not at ADA goal of <7.0 Nancy arrived alone and did not have her meter with her, but did bring her logbook. Per report, She has been checking  BG   and states she is  taking all medications daily as prescribed with no missed or skipped doses. Last visit with [facility] was back in 2017 and prior to today she had been seeing Lubna Sin NP with [facility] date of 3/13/19.     Current diabetes medications:  Metformin 1000 mg BID, Lantus 25 units SC QD, Victoza 1.8 mg SC QD  BG Summary:  -  reviewed most recent are listed first  FB, X, 67, 139, 107, 167, 64, X, 112, 162, 72, 70, 94, 63  Post dinner / HS 59, 63  NOC: 56   These were reviewed and discussed with Nancy today - she is currently injecting Lantus in am - so reasonably this should not be the causative factor for any low BG NOC. Victoza is being administered midday     Nutrition: Currently eating 3 meals each day  - trying to limit to 30 CHO per meal- feels any more may be contributing/ exacerbating cramping in legs  Having low carb ice cream cone at 8 pm and spoonful of PB at 9 pm before bed  Had questions about testing for gluten intolerance- celiac disease advised her to speak with Dr Marta Hummel  Suggested that with her HS PB she also have 4-5 crackers to see if this helps alleviate hypoglycemic episodes in the middle of the night    Activity: limited somewhat due to physical barriers- cramping in legs - Nancy expressed her frustration with this as she has been unable to get any definitive answers as to cause of this - and because of the cramping she is unable to exercise and this is contributing to driving her BG up as well. I suggested pool exercises- Nancy said she had a pool available to her and sometimes uses it but legs will sometimes cramp up on her while  swimming as well    Plan: Check blood sugars 1-2 ( preferably 2) each day , Nancy was reminded to bring meter and log book to all follow up appointments for review. Eat three balanced meals each day following the parameters for intake for all meals and snacks from ADA guidelines. Keep active - goal being 30 minutes daily of moderate activity. Medications: Continue as currently prescribed.   Follow up has been scheduled for 8/14/19 and She was instructed to call with any questions/concerns that may come up before then.       Freestyle Rene Pro continuous glucose monitor was inserted/ placed on patient today while in clinic.  Placed sensor on the back of right  upper arm.  Started sensor. SN  [id number]  EXP 11/30/2019.  Instructed pt to use record all food, beverages, blood glucose readings and medications.    Scheduled appt for pt to return for download on 8/14/19.    Goals:  Record food, activity, and medications during the 1-2 weeks while wearing the sensor.   Return the sensor and records on 8/7/19      Subjective and Objective:      Nancy Hughes has been referred by Dr Marta Hummel for Diabetes Education.     Lab Results   Component Value Date    HGBA1C 7.3 (H) 06/19/2019     /88 Comment: md notified, instruct pt to increase Metropolol to 50 mg  Wt 172 lb 1.6 oz (78.1 kg)   BMI 29.08 kg/m   she will f/u in clinic again on 7/30/19       Goals       Activity      Incorporate 30 minutes exercise / activity into each day      MET 8/17/2016 1/25/2017  SOMETIMES MET  7/24/2019                Monitor       1. Check blood sugars 3  times each day  FBG, pre lunch and 2 hour post lunch  For 1 week  Following week test FBG pre dinner and 2 hour post dinner  7/13/2016 MOSTLY MET  8/17/2016 1/25/2017  SOMETIMES MET 7/24/2019      remember to bring meter and log book to all appointments          Nutrition       Eat 3 balanced meals each day - Monitor carb intake and limit to 3-4 choices (45-60 grams) per  meal    Do not wait longer than 4-5 hours to eat something   7/13/2016   MET 8/17/2016   MOSTLY MET   1/25/2017 7/24/2019                      Follow up:   Primary care visit  CDE (certified diabetic educator)      Education:     Monitoring   Meter (per above goals): Assessed, Discussed and Competent  Monitoring: Assessed and Discussed  BG goals: Assessed, Discussed and Literature provided    Nutrition Management  Nutrition Management: Assessed and Discussed  Weight: Assessed and Discussed  Portions/Balance: Assessed and Discussed  Carb ID/Count: Assessed and Discussed  Label Reading: Discussed  Heart Healthy Fats: Discussed  Menu Planning: Assessed and Discussed  Dining Out: Discussed  Physical Activity: Assessed and Discussed  Medications: Assessed and Discussed  Orals: Assessed and Discussed  Injected Medications: Assessed and Discussed   Storage/Exp:Discussed   Site Rotation: Assessed and Discussed   Sites Assessed: no    Diabetes Disease Process: Assessed and Discussed    Acute Complications: Prevent, Detect, Treat:  Hypoglycemia: Assessed, Discussed and Needs instruction/review at follow-up  Hyperglycemia: Assessed, Discussed and Needs instruction/review at follow-up  Sick Days: Discussed  Driving: Not addressed  Goal Setting and Problem Solving: Assessed and Discussed  Barriers: Assessed and Discussed  Psychosocial Adjustments: Assessed and Discussed      Time spent with the patient: 60 minutes for diabetes education and counseling.   Previous Education: yes  Visit Type:DSMT  Hours Remaining: DSMT 1 and MNT 2      Katharina Vásquez RN, CDE  Diabetes Education  7/24/2019

## 2021-05-30 NOTE — PATIENT INSTRUCTIONS - [facility]
Wall circles (Hula hoop) clockwise and counterclockwise 10 reps each    Laying down with legs straight bring your legs out to the side and hold 30 seconds 3 reps.

## 2021-05-30 NOTE — PROGRESS NOTES
[facility] Daily Progress     Patient Name: Nancy Hughes  Date: 2019  Visit #: 2  PTA visit #: 1  Referral Diagnosis: [unfilled]  Referring provider: Aaron Toledo  Visit Diagnosis:     ICD-10-CM    1. Diabetic polyneuropathy associated with type 2 diabetes mellitus (H) E11.42    2. Cramp of both lower extremities R25.2    3. Cramps of lower extremity R25.2          Assessment:   Pt returns today for her first follow up appointment .Pt with intermittent B thigh pain and cramping. Pt does have diabetic nueropathy in her thighs not her feet.  Pt with most pain when she is toileting and sitting.  Pt with questions regarding nutrition. Pt is seeing a dietitian.   Pt very guarded with ROM.  Patient is benefitting from skilled physical therapy and is making steady progress toward functional goals.  Patient is appropriate to continue with skilled physical therapy intervention, as indicated by initial plan of care.    Goal Status:  Pt. will demonstrate/verbalize independence in self-management of condition in : 6 weeks;12 weeks  Pt. will be independent with home exercise program in : 6 weeks;12 weeks  Pt. will have improved quality of sleep: waking less times/night;with less pain;Comment;in 12 weeks  Comment:: The patient will notice less cramping when going to the bathroom at night only 3 times per week.  Patient will sit: 30 minutes;for eating;for watching TV;with less pain;with less difficultty;in 6 weeks;in 12 weeks    No data recorded    Plan / Patient Education:     Continue with initial plan of care.  Progress with home program as tolerated.    Subjective:   Pt states her legs have been more of a problem the last 2 nights. Pt reports she felt better after her first treatment.  Pt has thigh pain when sitting on the toilet.   Pt denies tingling and numbness. Pt takes Gabapentin.   Pt states she walks almost daily and goes to the [facility] to do a circuit class.     Pain Ratin-1 pt states  the pain is intermittent and will occasionally have sharp pain.        Objective:     Pt with tight ITB R>L  Pt demonstrates decreased hip ER and IR ROM B   SLR : R 50 degrees, L 70 degrees      Exercises:  Exercise #1: standing gastroc stretch using a towel roll under the ball of her foot.  Comment #1: 1-3 minutes until the patient no longer feels a stretch  Exercise #2: sitting hamstring stretch sitting weith the fot releaxed  and stretch 30 seconds to 3 minutes until the muscles relax.  Exercise #3: patient to hold the muscle until it relaxes 3-5 minutes or more instructed the patient's  to do this today.  Exercise #4: bent knee fall outs  Comment #4: x10 B  Exercise #5: standing or SL ITB stretch  Comment #5: 1' B        Treatment Today     TREATMENT MINUTES COMMENTS   Evaluation     Self-care/ Home management     Manual therapy 10 MFR/STM to L hip adductors   Neuromuscular Re-education     Therapeutic Activity     Therapeutic Exercises 20 Reviewed calf and HS stretches  Added to HEP:  -bent knee fall outs  -standing or SL ITB stretch (pt will both at home)  Discussed walking and gym program    Objective and subjective measures taken   Gait training     Modality__________________                Total 30    Blank areas are intentional and mean the treatment did not include these items.       Deborah Nuñez,STONE  7/8/2019

## 2021-05-30 NOTE — PROGRESS NOTES
[facility] Certification Request    June 25, 2019      Patient: Nancy Hughes  MR Number: [id number]  YOB: 1950  Date of Visit: 6/25/2019      Dear Dr. Marta Hummel, Aaron Boles, DO:    Thank you for this referral.   We are seeing Nancy Hughes for Physical Therapy of lower extremity cramping patient has diabetic neuropathy .    Medicare and/or Medicaid requires physician review and approval of the treatment plan. Please review the plan of care and verify that you agree with the therapy plan of care by co-signing this note.      Plan of Care  Authorization / Certification Start Date: 06/25/19  Authorization / Certification End Date: 09/25/19  Authorization / Certification Number of Visits: 12  Communication with: Referral Source  Patient Related Instruction: Nature of Condition;Treatment plan and rationale;Self Care instruction;Basis of treatment;Body mechanics;Posture;Precautions;Next steps;Expected outcome  Times per Week: 1-2  Number of Weeks: 6-12  Number of Visits: 12  Discharge Planning: when goals are met or plateau of progress  Precautions / Restrictions : diabetic  Therapeutic Exercise: ROM;Stretching;Strengthening  Neuromuscular Reeducation: kinesio tape;posture;balance/proprioception;TNE;core  Manual Therapy: myofascial release;soft tissue mobilization;joint mobilization;muscle energy;strain counterstrain;visceral manipulation;other  Manual Therapy: neural mobilization  Modalities: electrical stimulation;TENS;cold pack;hot pack;ultrasound  Equipment: theraband      Goals:  Pt. will demonstrate/verbalize independence in self-management of condition in : 6 weeks;12 weeks  Pt. will be independent with home exercise program in : 6 weeks;12 weeks  Pt. will have improved quality of sleep: waking less times/night;with less pain;Comment;in 12 weeks  Comment:: The patient will notice less cramping when going to the bathroom at night only 3 times per week.  Patient will sit: 30  minutes;for eating;for watching TV;with less pain;with less difficultty;in 6 weeks;in 12 weeks          If you have any questions or concerns, please don't hesitate to call.    Sincerely,      Claudine López, PT        Physician recommendation:     ___ Follow therapist's recommendation        ___ Modify therapy      *Physician co-signature indicates they certify the need for these services furnished within this plan and while under their care.         E11.42 (ICD-10-CM) - Diabetic polyneuropathy associated with type 2 diabetes mellitus (H)   R25.2 (ICD-10-CM) - Cramps of lower extremity     Allergic to lisinopril      [facility]   Foot/Ankle Initial Evaluation    Patient Name: Nancy Hughes  Date of evaluation: 6/25/2019  Referral Diagnosis:   E11.42 (ICD-10-CM) - Diabetic polyneuropathy associated with type 2 diabetes mellitus (H)   R25.2 (ICD-10-CM) - Cramps of lower extremity     Referring provider: Aaron Toledo*  Visit Diagnosis:     ICD-10-CM    1. Cramp of both lower extremities R25.2    2. Diabetic polyneuropathy associated with type 2 diabetes mellitus (H) E11.42        Assessment:    The patient has a significantly tight left lower abdominals and right adductors causing a left high pubic tubercle.  She has significant tightness throughout her lower extremities. She also had a rotation in her lumbar spine.  Will continue to assess patient's LE and spine. Assess gait.  Pt. is appropriate for skilled PT intervention as outlined in the Plan of Care (POC).  Pt. is a good candidate for skilled PT services to improve pain levels and function.  Skilled Physical Therapy needed to increase ROM, increase strength, decrease pain and improve functional status.      Goals:  Pt. will demonstrate/verbalize independence in self-management of condition in : 6 weeks;12 weeks  Pt. will be independent with home exercise program in : 6 weeks;12 weeks  Pt. will have improved quality of sleep: waking  less times/night;with less pain;Comment;in 12 weeks  Comment:: The patient will notice less cramping when going to the bathroom at night only 3 times per week.  Patient will sit: 30 minutes;for eating;for watching TV;with less pain;with less difficultty;in 6 weeks;in 12 weeks      Goals were set in collaboration with the patient.  Barriers to Learning or Achieving Goals:  No Barriers.    Patient's expectations/goals are realistic.       Plan / Patient Instructions:        Plan of Care:   Authorization / Certification Start Date: 06/25/19  Authorization / Certification End Date: 09/25/19  Authorization / Certification Number of Visits: 12  Communication with: Referral Source  Patient Related Instruction: Nature of Condition;Treatment plan and rationale;Self Care instruction;Basis of treatment;Body mechanics;Posture;Precautions;Next steps;Expected outcome  Times per Week: 1-2  Number of Weeks: 6-12  Number of Visits: 12  Discharge Planning: when goals are met or plateau of progress  Precautions / Restrictions : diabetic  Therapeutic Exercise: ROM;Stretching;Strengthening  Neuromuscular Reeducation: kinesio tape;posture;balance/proprioception;TNE;core  Manual Therapy: myofascial release;soft tissue mobilization;joint mobilization;muscle energy;strain counterstrain;visceral manipulation;other  Manual Therapy: neural mobilization  Modalities: electrical stimulation;TENS;cold pack;hot pack;ultrasound  Equipment: theraband      Plan for next visit: reassess how she is doing with her stretches and see if there are any questions with the massage technique.  Recheck her lower abdomen and pubic tubercles as well as her spine position.  Check the spinal mobility and check dural mobilization and check her psoas and IT band as well as repeated lumbar spine exercises.  Ask if she has any numbness tingling and or burning.  Check sensation if there is any issues.  Add in any desensitization techniques if needed if she is hyper  sensitive anywhere.  Add in hip adductor stretching as able to.     Subjective:           History of Present Illness:    Nancy is a 68 y.o. female who presents to therapy today with complaints of stabbing with cramps on the medial and anterior thigh and regular cramps on the calves bilaterally when she gets the pain she is naseus and sweaty.  She will get the craming when sitting on a low toilet at night.  Sometimes she can avoid the cramping with walking.  Night is more of a probem.  On gabapentin started this in November and since then she is able to sleep better through the night.  . Occaasional cramping with eating too many carbs, pains in the legs the last 7 years..    History of fractured vertebra when 37 years old  and PT for left upper trap tightness in the past.      Pain Ratin  Pain rating at best: 0  Pain rating at worst: 9  Pain description: sharp and tight charley horses    Functional limitations are described as occurring with:   sitting need to move  sleeping  standing need to move    Patient reports benefit from:  movement or exercise , shower     Objective:      Note: Items left blank indicates the item was not performed or not indicated at the time of the evaluation.    Patient Outcome Measures :      Lower Extremity Functional Scale (_/80): 59     Scores range from 0-80, where a score of 80 represents maximum function. The minimal clinically important difference is a positive change of 9 points.    L5 left rotated.  T1,2 right rotated.  SI WNL FW bend test WNL  Ankle/Foot Examination  1. Cramp of both lower extremities     2. Diabetic polyneuropathy associated with type 2 diabetes mellitus (H)       Precautions: None  Involved Side: bilaterally but right side more than the left  Posture Observation:      General sitting posture is  increased thoracic kyphosis mild and tight upper trap on the right.  General standing posture is fair.  Assistive Device: None  Gait Observation: did not assess  Hip  Clearing: Symptoms provoked with when the knee was away from midline the patient had significant increase in her symptoms      Lumbar flexion  1/2 way down the shins and moderate movement loss in extension no change in the pain.      LE strength 5/5 biaterallyhip flexion knee flexion and extension and ankle dorsiflexion.    Flexibility:  Palpation:    SLR 50 degrees on the right when relaxed then slightly less pain.  pectinus  SLR 70 degrees calf laterally right  Hip flexion when not in the middle then she had pain on her left adductors.  Significantly tight IR bilaterally, moderately tight ER bilaterallly  Significantly tight hamstrings bilateraly  Tight right adductors and tight left lower abdominals patient had a left high abdominals.          Treatment Today     TREATMENT MINUTES COMMENTS   Evaluation 30    Self-care/ Home management     Manual therapy 15 STM, MFR to right hip adductors as well as left lower abdonmen   Neuromuscular Re-education     Therapeutic Activity     Therapeutic Exercises 15 Standing gastroc stretch, manual massage to adductors instruction and seated hamstring stretch.    Gait training     Modality__________________                Total 60    Blank areas are intentional and mean the treatment did not include these items.   PT Evaluation Code: (Please list factors)    Patient History/Comorbidities: diabetes  Examination: lower extremity cramping and neuropathy  Clinical Presentation: stable  Clinical Decision Making: low    Patient History/  Comorbidities Examination  (body structures and functions, activity limitations, and/or participation restrictions) Clinical Presentation Clinical Decision Making (Complexity)   No documented Comorbidities or personal factors 1-2 Elements Stable and/or uncomplicated Low   1-2 documented comorbidities or personal factor 3 Elements Evolving clinical presentation with changing characteristics Moderate   3-4 documented comorbidities or personal factors 4  or more Unstable and unpredictable High                Claudine López PT  6/25/2019  12:56 PM

## 2021-05-30 NOTE — PATIENT INSTRUCTIONS - HE
1. Eat 3 balanced meals each day - Monitor carb intake and limit to 45-60 grams per meal  This would be equal to 3-4 choices ~  1 choice = 15 grams  Avoid having less than 30 grams per meal   Do not wait longer than 4-5 hours to eat something  Snacks limit to no more than 30 grams of carbohydrates or 2 choices  Make sure you include protein source with each meal and at bedtime - this has been shown to help with blood glucose elevations  At bedtime try having 5-6 tricuits with your peanut butter     2. Check blood sugars 1-2  times each day   Fasting and before meal target is 80 - 130   2 hours after a meal target is < 180  remember to bring meter and log book to all appointments    3. Incorporate 30 minutes activity into each day - does not need to be all at one time & walking counts    4. Take diabetes medications as prescribed Metformin 1000 mg twice daily, 25 units Lantus under the skin each morning, 1.8 mg Victoza under the skin at lunch time    Remove sensor 14 days from today which will be August 7th - place in a pill bottle or baggie and drop off with your log at clinic

## 2021-05-31 VITALS — HEIGHT: 65 IN | WEIGHT: 154.5 LBS | BODY MASS INDEX: 25.74 KG/M2

## 2021-05-31 VITALS — WEIGHT: 157.1 LBS | BODY MASS INDEX: 26.17 KG/M2 | HEIGHT: 65 IN

## 2021-05-31 VITALS — HEIGHT: 65 IN | WEIGHT: 159.3 LBS | BODY MASS INDEX: 26.54 KG/M2

## 2021-05-31 VITALS — BODY MASS INDEX: 25.83 KG/M2 | HEIGHT: 65 IN | WEIGHT: 155 LBS

## 2021-05-31 NOTE — TELEPHONE ENCOUNTER
Who is calling:  Patient   Reason for Call:  Patient is asking that the below prescription be printed, and placed at the . Patient states the blood pressure cuff is not covered at [facility], but will be covered at a medical supply company. Please reach out to the patient when the prescription is ready for . Patient is asking that this message be addressed today.  Date of last appointment with primary care: 07/24/2019  Okay to leave a detailed message: Yes

## 2021-05-31 NOTE — TELEPHONE ENCOUNTER
Who is calling:  Patient   Reason for Call:  Per patient, please discard below request. Patient reports this is not what she thought it was and it is not covered by her insurance.  Date of last appointment with primary care: n/a  Okay to leave a detailed message: Yes

## 2021-05-31 NOTE — TELEPHONE ENCOUNTER
Medication Request  Medication name:   blood glucose test strips        Sig - Route: Use 1 each As Directed as needed. Dispense brand per patient's insurance at pharmacy discretion. - Miscellaneous    Sent to pharmacy as: blood glucose test strips    Notes to Pharmacy: One touch Ultra strip Blue 100 test strips    Requesting a 90 day supply, 3 boxes of 100 each.     Pharmacy Name and Location: [facility]  Reason for request: she is no longer seeing her endocrinologist and will follow Dr. Lozano for this matter.  When did you use medication last?:  today  Patient offered appointment:  No  Okay to leave a detailed message: yes

## 2021-05-31 NOTE — TELEPHONE ENCOUNTER
Patient in today for BP check    138/68  Pulse 66  resp 16    Patient medication increased  Feeling fine    Please review and advise.  Thank you so much!  Arabella Velasquez, CMA

## 2021-06-01 VITALS — BODY MASS INDEX: 27.94 KG/M2 | HEIGHT: 65 IN | WEIGHT: 167.7 LBS

## 2021-06-01 VITALS — HEIGHT: 65 IN | WEIGHT: 167 LBS | BODY MASS INDEX: 27.82 KG/M2

## 2021-06-01 NOTE — PATIENT INSTRUCTIONS - HE
1.  Desired calcium intake 1200 mg to 1500 mg daily between diet and supplement    2.  Start alendronate 70 mg weekly    3.  Recheck bone density after one year on Alendronate    4.  See me after next bone density test

## 2021-06-01 NOTE — PROGRESS NOTES
ASSESSMENT:  1. Osteopenia of multiple sites  Nancy's recent bone density study was reviewed.  She has had a significant decline since her earlier study, and does have a high future predicted fracture risk based on FRAX.  She has a history of vertebral fracture.  However, this occurred in an injury while sliding at age 34, and probably would not be regarded as a fragility fracture.  Medical history is notable for type 2 diabetes with peripheral neuropathy, essential hypertension, and hyperlipidemia.  Will be screened for secondary causes of low bone density.  Various therapeutic options were discussed.  I do not feel that she would be a candidate for anabolic agents since she has not had any recent fragility fractures.  Antiresorptive agents including alendronate, Prolia, and Reclast were discussed.  After discussion, she is interested in alendronate  - Vitamin D, Total (25-Hydroxy)  - Thyroid Stimulating Hormone (TSH)  - Tissue Transglutaminase,IgA & IgG  - Parathyroid Hormone Intact  - alendronate (FOSAMAX) 70 MG tablet; Take 1 tablet (70 mg total) by mouth every 7 days. Take in the morning on an empty stomach with a full glass of water 30 minutes before food  Dispense: 12 tablet; Refill: 3  - DXA Bone Density Scan; Future    2. Chronic fatigue  TSH is checked as part of her evaluation of low bone density  - Thyroid Stimulating Hormone (TSH)          PLAN:  Patient Instructions   1.  Desired calcium intake 1200 mg to 1500 mg daily between diet and supplement    2.  Start alendronate 70 mg weekly    3.  Recheck bone density after one year on Alendronate    4.  See me after next bone density test      Orders Placed This Encounter   Procedures     DXA Bone Density Scan     Standing Status:   Future     Standing Expiration Date:   3/27/2021     Order Specific Question:   Can the procedure be changed per Radiologist protocol?     Answer:   Yes     Vitamin D, Total (25-Hydroxy)     Thyroid Stimulating Hormone (TSH)      Tissue Transglutaminase,IgA & IgG     Parathyroid Hormone Intact     There are no discontinued medications.    Return in about 1 year (around 9/27/2020) for Recheck.      ASSESSED PROBLEMS:  1. Osteopenia of multiple sites  Vitamin D, Total (25-Hydroxy)    Thyroid Stimulating Hormone (TSH)    Tissue Transglutaminase,IgA & IgG    Parathyroid Hormone Intact    alendronate (FOSAMAX) 70 MG tablet    DXA Bone Density Scan   2. Chronic fatigue  Thyroid Stimulating Hormone (TSH)       CHIEF COMPLAINT:  Chief Complaint   Patient presents with     Osteoporosis Consult       HISTORY OF PRESENT ILLNESS:  Nancy is a 69 y.o. female presenting to the clinic today for an osteoporosis consultation accompanied by her . She was referred by Dr. Lozano    Osteopenia: On 6/19/2019 her L1-L4 spine bone density  had a T-score of -2.0 and her left and right femoral neck had a T-score of -2.1 and -2.0 respectively. She does have a history of fractures. When she was in her mid to late thirties she fractured one of her upper vertebrate following a sledding accident. She takes two calcium citrate supplements every day. She has no history of kidney stones, but she has had kidney infections. Menopause started in her late 40's and ended in her early 50's. She has not undergone any prolonged steroid treatment.  She is a non-smoker.  She has not had previous antiresorptive therapy.    Type II Diabetes with Diabetic Neuropathy: She experiences hypoglycemic episodes and fears they would interfere with the Prolia treatment plan. Her primary care physician has been altering her insulin levels. When they increased her insulin she gained weight and they are now decreasing her insulin and she expects to lose weight. Her diabetic neuropathy causes sharp pain in her legs and she has had to cut back on exercise.       REVIEW OF SYSTEMS:   She is experiencing some fatigue and is sleeping more. All other systems are negative.    PFSH:  She walks 30-45  "minutes a day and attends a cycling aerobics class once a week at the [facility].    TOBACCO USE:  Social History     Tobacco Use   Smoking Status Former Smoker     Types: Cigarettes   Smokeless Tobacco Never Used       VITALS:  Vitals:    09/27/19 1030   BP: 152/80   Patient Site: Left Arm   Patient Position: Sitting   Cuff Size: Adult Regular   Pulse: 68   SpO2: 97%   Weight: 173 lb (78.5 kg)   Height: 5' 4.57\" (1.64 m)     Wt Readings from Last 3 Encounters:   09/27/19 173 lb (78.5 kg)   09/19/19 172 lb (78 kg)   09/18/19 175 lb 8 oz (79.6 kg)     Body mass index is 29.18 kg/m .    PHYSICAL EXAM:  Constitutional:  Reveals an alert pleasant healthy appearing woman. Not acutely ill. Ambulates easily without assistance. Gets up from chair without use of arms. Vitals: per nursing notes.  Back:  No thoracic kyphosis.       ADDITIONAL HISTORY SUMMARIZED (2): Additional information from her .  DECISION TO OBTAIN EXTRA INFORMATION (1): None.   RADIOLOGY TESTS (1): DXA ordered  LABS (1): Reviewed 9/19/2019 labs. Labs ordered.  MEDICINE TESTS (1): None.  INDEPENDENT REVIEW (2 each): None.     The visit lasted a total of 30 minutes face to face with the patient. Over 50% of the time was spent counseling and educating the patient about osteoporosis.    IMiri, am scribing for and in the presence of, Dr. Cervantes.    IAngel, personally performed the services described in this documentation, as scribed by Miri Saleh in my presence, and it is both accurate and complete.    Dragon dictation was used for this note.  Speech recognition errors are a possibility.    MEDICATIONS:  Current Outpatient Medications   Medication Sig Dispense Refill     amLODIPine (NORVASC) 5 MG tablet Take 1 tablet (5 mg total) by mouth daily. 90 tablet 3     aspirin 81 mg TbEF Take 1 tablet by mouth daily.       aspirin-acetaminophen-caffeine (EXCEDRIN MIGRAINE) 250-250-65 mg per tablet Take 1 tablet by mouth every 6 (six) hours as " "needed for pain.       BD ULTRA-FINE SHERRY PEN NEEDLES 32 gauge x 5/32\" Ndle USE TWICE A DAY WITH INSULIN  each 1     blood glucose test strips Use 1 each As Directed 3 (three) times a day. Dispense brand per patient's insurance at pharmacy discretion. 300 strip 3     calcium citrate-vitamin D (CALCIUM CITRATE +) 315-200 mg-unit per tablet Take 1 tablet by mouth 2 (two) times a day.       DOCUSATE CALCIUM (STOOL SOFTENER ORAL) Take 100 mg by mouth daily.        gabapentin (NEURONTIN) 300 MG capsule Take 1 capsule (300 mg total) by mouth at bedtime. 30 capsule 1     insulin glargine (LANTUS; BASAGLAR) 100 unit/mL (3 mL) pen Use daily in the morning as directed, up to 30 units a day 10 adj dose pen 5     lancets 30 gauge Misc Use 1 each As Directed 3 (three) times a day. Use one lancet with your Verio meter to check blood sugar as directed 300 each 3     magnesium oxide 250 mg Tab Take 250 mg by mouth daily.       metFORMIN (GLUCOPHAGE) 1000 MG tablet Take 1 tablet (1,000 mg total) by mouth 2 (two) times a day with meals. 180 tablet 3     metoprolol succinate (TOPROL XL) 50 MG 24 hr tablet Take 1 tablet (50 mg total) by mouth daily. 90 tablet 3     MULTIVITAMIN (MULTIPLE VITAMIN ESSENTIAL ORAL) Take 1 tablet by mouth daily.        pen needle, diabetic (BD ULTRA-FINE SHERRY PEN NEEDLE) 32 gauge x 5/32\" Ndle Use twice daily with insulin 300 each 3     psyllium with dextrose (FIBER) powder Take by mouth 3 (three) times a day. 3 scoops every morning       alendronate (FOSAMAX) 70 MG tablet Take 1 tablet (70 mg total) by mouth every 7 days. Take in the morning on an empty stomach with a full glass of water 30 minutes before food 12 tablet 3     liraglutide (VICTOZA) 0.6 mg/0.1 mL (18 mg/3 mL) PnIj injection Inject 1.8 mg under the skin daily for 90 doses. With or without food. 6 mL 11     No current facility-administered medications for this visit.        Total data points: 4  "

## 2021-06-01 NOTE — TELEPHONE ENCOUNTER
Central PA team  [phone]  Pool: HE PA MED ([id number])          PA has been initiated.       PA form completed and faxed insurance via Cover My Meds     Key:  [id number]     Medication:  Tresiba FlexTouch (insulin degludec injection) 100 Units/mL solution    Insurance:  [facility]        Response will be received via fax and may take up to 5-10 business days depending on plan

## 2021-06-01 NOTE — PROGRESS NOTES
"Assessment / Impression     1. Type 2 diabetes mellitus with diabetic neuropathy, with long-term current use of insulin (H)  Glycosylated Hemoglobin A1c    Comprehensive Metabolic Panel   2. Benign Essential Hypertension     3. Diabetic polyneuropathy associated with type 2 diabetes mellitus (H)     4. Other hyperlipidemia           Plan:     We are going to check a CMP and hemoglobin A1c today.  The diabetes educator is looking into [facility] to see if we can get this covered.  For now, she will continue Lantus 22 units daily in addition to metformin and Victoza.  Her blood pressure is well controlled on amlodipine and metoprolol.  She will continue to work on lifestyle modifications to help keep the cholesterol under control since she does not tolerate statins.  She will follow-up in 3 months or sooner if needed.    Subjective:      HPI: Nancy Hughes is a 69 y.o. female who presents to the clinic for diabetes follow-up exam.  She recently wore the continuous glucose monitor, Rene, which showed episodic overnight hypoglycemia spells.  She was seen by the diabetes educator yesterday and her Lantus dose was decreased from 25units to 22 units daily.  She continues to take metformin 1000 mg twice daily and Victoza.  On 6/19/2019 her hemoglobin A1c was 7.3%, urine microalbumin negative, , vitamin B12 and magnesium levels were normal.  Her CMP was normal with the exception that the potassium was slightly elevated at 5.1.  She is unable to tolerate statins.        Review of Systems  All other systems reviewed and are negative.     Social History     Tobacco Use   Smoking Status Former Smoker     Types: Cigarettes   Smokeless Tobacco Never Used       Family History   Problem Relation Age of Onset     Breast cancer Mother 81     Colon cancer Brother 64     Breast cancer Paternal Aunt 65       Objective:     /80   Pulse 73   Ht 5' 4.5\" (1.638 m)   Wt 172 lb (78 kg)   SpO2 97%   BMI 29.07 kg/m    Physical " "Examination: General appearance - alert, well appearing, and in no distress  Eyes: pupils equal and reactive, extraocular eye movements intact  Mouth: mucous membranes moist, pharynx normal without lesions  Neck: supple, no significant adenopathy  Lungs: clear to auscultation, no wheezes, rales or rhonchi, symmetric air entry  Heart: normal rate, regular rhythm, normal S1, S2, no murmurs, rubs, clicks or gallops  Abdomen: soft, nontender, nondistended, no masses or organomegaly  Neurological: alert, oriented, normal speech, no focal findings or movement disorder noted.    Extremities: No edema, no clubbing or cyanosis  Psychiatric: Normal affect. Does not appear anxious or depressed.    Recent Results (from the past 168 hour(s))   Glycosylated Hemoglobin A1c   Result Value Ref Range    Hemoglobin A1c 8.1 (H) 3.5 - 6.0 %       Current Outpatient Medications   Medication Sig Note     amLODIPine (NORVASC) 5 MG tablet Take 1 tablet (5 mg total) by mouth daily.      aspirin 81 mg TbEF Take 1 tablet by mouth daily.      aspirin-acetaminophen-caffeine (EXCEDRIN MIGRAINE) 250-250-65 mg per tablet Take 1 tablet by mouth every 6 (six) hours as needed for pain.      BD ULTRA-FINE SHERRY PEN NEEDLES 32 gauge x 5/32\" Ndle USE TWICE A DAY WITH INSULIN PEN      blood glucose test strips Use 1 each As Directed 3 (three) times a day. Dispense brand per patient's insurance at pharmacy discretion.      calcium citrate-vitamin D (CALCIUM CITRATE +) 315-200 mg-unit per tablet Take 1 tablet by mouth 2 (two) times a day.      DOCUSATE CALCIUM (STOOL SOFTENER ORAL) Take 100 mg by mouth daily.       gabapentin (NEURONTIN) 300 MG capsule Take 1 capsule (300 mg total) by mouth at bedtime.      insulin glargine (LANTUS; BASAGLAR) 100 unit/mL (3 mL) pen Use daily in the morning as directed, up to 30 units a day      lancets 30 gauge Misc Use 1 each As Directed 3 (three) times a day. Use one lancet with your Verio meter to check blood sugar as " "directed      magnesium oxide 250 mg Tab Take 250 mg by mouth daily.      metFORMIN (GLUCOPHAGE) 1000 MG tablet Take 1 tablet (1,000 mg total) by mouth 2 (two) times a day with meals.      metoprolol succinate (TOPROL XL) 50 MG 24 hr tablet Take 1 tablet (50 mg total) by mouth daily. 7/24/2019: Taking 25 mg      MULTIVITAMIN (MULTIPLE VITAMIN ESSENTIAL ORAL) Take 1 tablet by mouth daily.       pen needle, diabetic (BD ULTRA-FINE SHERRY PEN NEEDLE) 32 gauge x 5/32\" Ndle Use twice daily with insulin      liraglutide (VICTOZA) 0.6 mg/0.1 mL (18 mg/3 mL) PnIj injection Inject 1.8 mg under the skin daily for 90 doses. With or without food.      psyllium with dextrose (FIBER) powder Take by mouth 3 (three) times a day. 3 scoops every morning      "

## 2021-06-01 NOTE — PROGRESS NOTES
Assessment: Nancy Hughes is a  69 y.o.who presents today for a consult for her diabetes management, review of blood sugars, and review of CGM data she had worn from  to 2019  A1c presently not at ADA goal of <7.0 Nancy arrived alone and did not have her meter with her but did have a notebook with her blood glucose data. Per report, She has been checking  BG once on most days and states taking all diabetes medications daily as prescribed with no missed or skipped doses.    Current diabetes medications: Metformin 1000 mg p.o. twice daily, Victoza 1.8 mg SC daily, and Lantus 25 units subcu daily  BG Summary: Both her most recent blood sugar information as well as data collected from the [employer] CGM was reviewed with Nancy today discussed  From her notebook  through  were reviewed and are as follows with most recent data first  FB, 62, 80, 96, 118, 121, 131, 94, 117, 165, 105, 61, 231, 115, 77  From her CGM report her overall average glucose was 141 mg/dL as for timing range 22% of the time her blood sugars were found to be above 180, in target range  was 70% of the time, and 8% of the time blood sugars were below 70 mg/dL which I informed her today had be concerned  Nancy had several hypoglycemic episodes recorded with the majority occurring between 3 and just before 6 AM  Based upon the data reviewed today it would be my recommendation to decrease her Lantus to 22 units SC daily-if she continues to experience hypoglycemia NOC I would suggest then trying to split the dose to twice daily to see if this helps.  Ideally I feel that transitioning her to Tresiba would be of great benefit as this basal insulin is noted for less instances of hypoglycemia NOC -during her visit today Nancy contacted her insurance provider and was told that Tresiba was not in fact preferred and formulary.  Informed her I would work on initiating a PA.    Nutrition: Currently eating 3 meals each day. Nancy keeps detailed records of  all foods she eats daily and had them with her today.  Review of her log shows her meals are overall well balanced, portions are found to be within parameters and she is having her meals a timely intervals.  Per her report she stated she typically keeps all meals to no more than 30 gm of carbohydrates.  If she is low she will have 5 gummy bears or 2 gummy worms immediately (15 CHO) followed by 2-3 crackers with peanut butter.      Activity: Nancy goes to the [facility] each Monday for circuit training class- during the rest of the week she walks daily.  I encouraged her to continue with this, with 30 minutes daily as goal.    Plan: Check blood sugars twice each day -so that she continue checking each morning but also gets some post meal numbers for review, Nancy was reminded to bring meter and log book to all follow up appointments for review. Eat three balanced meals each day following the parameters for intake for all meals and snacks from ADA guidelines. Keep active - goal being 30 minutes daily of moderate activity. Medications: Continue metformin and Victoza as prescribed, decrease Lantus to 22 units.  I will follow-up with Nancy via my chart to review blood sugars and assess insulin change- she has agreed to send me her blood sugars in 1 to 2 weeks. She was instructed to call with any questions/concerns that may come up before then.      Subjective and Objective:      Nancy Hughes has been referred by Dr. Marta Hummel for Diabetes Education.     Lab Results   Component Value Date    HGBA1C 7.3 (H) 06/19/2019         Wt 175 lb 8 oz (79.6 kg)   BMI 29.66 kg/m      Goals       Activity      Incorporate 30 minutes exercise / activity into each day      MET 8/17/2016 1/25/2017  SOMETIMES MET  7/24/2019                Monitor       1. Check blood sugars 3  times each day  FBG, pre lunch and 2 hour post lunch  For 1 week  Following week test FBG pre dinner and 2 hour post dinner  7/13/2016 MOSTLY MET  8/17/2016 1/25/2017   SOMETIMES MET 7/24/2019      remember to bring meter and log book to all appointments          Nutrition       Eat 3 balanced meals each day - Monitor carb intake and limit to 3-4 choices (45-60 grams) per meal    Do not wait longer than 4-5 hours to eat something   7/13/2016   MET 8/17/2016   MOSTLY MET   1/25/2017 7/24/2019                      Follow up:   Primary care visit  CDE (certified diabetic educator)      Education:     Monitoring   Meter (per above goals): Assessed and Discussed  Monitoring: Assessed, Discussed and Literature provided  BG goals: Assessed, Discussed and Literature provided    Nutrition Management  Nutrition Management: Assessed and Discussed  Weight: Assessed and Discussed  Portions/Balance: Assessed and Discussed  Carb ID/Count: Assessed and Discussed  Label Reading: Discussed  Heart Healthy Fats: Assessed and Discussed  Menu Planning: Assessed and Discussed  Dining Out: Discussed  Physical Activity: Assessed, Discussed and Needs instruction/review at follow-up  Medications: Assessed and Discussed  Orals: Assessed and Discussed  Injected Medications: Assessed and Discussed   Storage/Exp:Discussed   Site Rotation: Discussed       Diabetes Disease Process: Assessed and Discussed    Acute Complications: Prevent, Detect, Treat:  Hypoglycemia: Assessed, Discussed and Needs instruction/review at follow-up  Hyperglycemia: Assessed, Discussed and Needs instruction/review at follow-up        Goal Setting and Problem Solving: Assessed and Discussed  Barriers: Assessed and Discussed  Psychosocial Adjustments: Assessed and Discussed      Time spent with the patient: 60 minutes for diabetes education and counseling.   Previous Education: yes  Visit Type:OJT        Katharina Vásquez RN CDE  Diabetes Education  9/18/2019

## 2021-06-01 NOTE — TELEPHONE ENCOUNTER
Prior Authorization Request  Who s requesting:  Patient and provider  Pharmacy Name and Location: [facility] [address] ([address])  Medication Name: Tresiba U100  20 units SC QD  Insurance Plan: [facility]  Insurance Member ID Number:  [id number]  Informed patient that prior authorizations can take up to 10 business days for response:   Yes  Okay to leave a detailed message: Yes    Patient is having multiple episodes of nocturnal hypoglycemia  Tresiba has been evidenced to prevent this

## 2021-06-01 NOTE — PATIENT INSTRUCTIONS - HE
1. Eat 3 balanced meals each day - Monitor carb intake and limit to 45-60 grams per meal  This would be equal to 3-4 choices ~  1 choice = 15 grams    Do not wait longer than 4-5 hours to eat something  Snacks limit to no more than 30 grams of carbohydrates or 2 choices  Make sure you include protein source with each meal and at bedtime - this has been shown to help with blood glucose elevations    2. Try check blood sugars 2-3  times each day    Fasting and before meal target is 80 - 130   2 hours after a meal target is < 180  remember to bring meter and log book to all appointments    3. Incorporate 30 minutes activity into each day - does not need to be all at one time & walking counts    4. Take diabetes medications as prescribed Metformin 1000 mg twice daily, Victoza 1.8 mg daily , decrease Lantus to 22 units to see if that helps with the hypoglycemia at night - let's see how this works for a couple weeks if still having the problem we will look into splitting the dose  In the meantime I will start a prior authorization for Tresiba

## 2021-06-02 VITALS — BODY MASS INDEX: 27.73 KG/M2 | WEIGHT: 164.1 LBS

## 2021-06-02 VITALS — BODY MASS INDEX: 28.17 KG/M2 | HEIGHT: 65 IN | WEIGHT: 169.1 LBS

## 2021-06-02 VITALS — HEIGHT: 65 IN | WEIGHT: 162 LBS | BODY MASS INDEX: 26.99 KG/M2

## 2021-06-02 NOTE — TELEPHONE ENCOUNTER
Had colonoscopy Friday.    Have not been feeling well since doing the prep.  Diabetic neuropathy worse.    Little nausea, does not feel like eating.    No sharp or sever pain or fever.    Yesterday passed small BM and smelled bad.Not bloody or black. Has been passing gas. Previous BM was last Thursday night.    Trying to push fluids but feels nausea.    BS this Am 77.  Now was 148. Normal for her.    Recommended sugar free Gatorade.  Needs electrolytes.    Few polyps were removed.    No longer takes fiber on med list. Does take stool softener.    Does not have regular BM but every 3-4 days.  This is her normal after a colonoscopy.    Will try hydration today with Gatorade and soup broth.  If pain increases or has additional concerns she will call back to triage.  Patient will call to be seen tomorrow if not feeling better by then.    Kenyetta Zhu, RN, [facility] Nurse Triage/Med Refills RN

## 2021-06-02 NOTE — TELEPHONE ENCOUNTER
Reason for Disposition    Constipation is a recurrent ongoing problem (i.e., < 3 BMs / week or straining > 25% of the time)    Protocols used: CONSTIPATION-A-[place]

## 2021-06-02 NOTE — TELEPHONE ENCOUNTER
Please let this patient know that I recommend she decrease her long-acting insulin to 18 units on the morning of surgery.  According to my records it looks like she is taking her long-acting insulin in the morning.  She should hold the metformin and Victoza on the morning of surgery and she may restart them after the procedure once she is eating again.  She can go back to the regular 22 units of long-acting insulin the morning after the colonoscopy.  She should try and match her clear liquid diet the day prior to the colonoscopy to her usual carbohydrate intake.  I agree with her touching base with the gastroenterologist about whether or not IV fluids would be helpful during the procedure.  Thank you, DE

## 2021-06-02 NOTE — TELEPHONE ENCOUNTER
Who is calling:  Patient  Reason for Call:  Patient stated the PA was approved on the Tresiba but now she needs an actual Rx sent to [facility] for a 90 day supply.  Date of last appointment with primary care: 9/19/19  Okay to leave a detailed message: Yes

## 2021-06-02 NOTE — PROGRESS NOTES
Assessment and Plan:     1. Routine general medical examination at a health care facility  I encouraged her to work on eating healthy foods and get regular physical activity.  Her last mammogram was on 7/12/2019 and she is up-to-date on DEXA screening.  She was given another pneumonia 23 vaccination today since her first 1 was given prior to the age of 64.    2. Type 2 diabetes mellitus with diabetic neuropathy, with long-term current use of insulin (H)  She just started the Tresiba today.  She is currently at 22 units daily in addition to Victoza and metformin 1000 mg twice daily.  She will not be due for another A1c for a couple of months.  She is working closely with myself and our diabetes educator to help bring the glucose numbers down.  Her hemoglobin A1c last month was 8.1%.    3. Diabetic polyneuropathy associated with type 2 diabetes mellitus (H)  She may continue gabapentin continues to work well in controlling neuropathy symptoms.    4. Diabetic retinopathy associated with type 2 diabetes mellitus, macular edema presence unspecified, unspecified laterality, unspecified retinopathy severity (H)  She will continue to get regular eye exams through ophthalmology.    5. Benign Essential Hypertension  Blood pressures well controlled on amlodipine 5 mg daily and metoprolol XL 50 mg daily.    6. Other hyperlipidemia  Unfortunately, she is unable to tolerate statins.  Her LDL cholesterol in June was 116    7. Osteopenia of multiple sites  She was recently seen by Dr. Parker for this.  Her recent DEXA scan showed significant decline and it was recommended that she start Fosamax.  She is planning to wait on starting this until after her cataract surgery later this fall.    8. Cataract of both eyes, unspecified cataract type  She is scheduled to have bilateral cataract surgery later this fall.    The patient's current medical problems were reviewed.      The following health maintenance schedule was reviewed with  the patient and provided in printed form in the after visit summary:   Health Maintenance   Topic Date Due     HEPATITIS C SCREENING  1950     PNEUMOCOCCAL IMMUNIZATION 65+ LOW/MEDIUM RISK (2 of 2 - PPSV23) 07/24/2016     MEDICARE ANNUAL WELLNESS VISIT  06/14/2018     DIABETES FOOT EXAM  08/21/2019     DIABETES FOLLOW-UP  12/19/2019     DIABETES HEMOGLOBIN A1C  03/19/2020     DIABETES URINE MICROALBUMIN  06/19/2020     FALL RISK ASSESSMENT  09/19/2020     DIABETES OPHTHALMOLOGY EXAM  09/30/2020     DXA SCAN  07/09/2021     MAMMOGRAM  07/12/2021     TD 18+ HE  02/05/2024     COLONOSCOPY  10/11/2024     ADVANCE CARE PLANNING  10/30/2024     INFLUENZA VACCINE RULE BASED  Completed     ZOSTER VACCINES  Completed        Subjective:   Chief Complaint: Nancy Hughes is an 69 y.o. female here for an Annual Wellness visit.   HPI:   She has a medical history significant for type 2 diabetes, hypertension, hyperlipidemia peripheral neuropathy, diabetic retinopathy and osteopenia.  She reports that she is having less hypoglycemic episodes over the past month since decreasing the Lantus dose to 22 units.  She continues to take metformin 1000 mg twice daily and Victoza.  Today she started Tresiba and she is hopeful that she will have less hypoglycemic episodes on this medication.  She is still having occasional highs into the mid 200s.  Last month her hemoglobin A1c was 8.1% and CMP was normal.  On 6/19/2019 her LDL was 116, B 12 and magnesium levels were normal.  On 9/27/2019 she was seen by an osteoporosis specialist.  Her DEXA scan shows significant decline and it was recommended that she start Fosamax.  She is waiting to start this until after her cataract surgeries this fall.  Lab testing that day showed a normal TSH, vitamin the, PTH and all tissue transglutaminase antibodies.    Social history:     Review of Systems:   Please see above.  The rest of the review of systems are negative for all systems.    Patient  Care Team:  Aaron Toledo DO as PCP - General (Family Medicine)  Aaron Toledo DO as Assigned PCP     Patient Active Problem List   Diagnosis     Hyperlipemia     Benign Essential Hypertension     Osteopenia of multiple sites     Overweight     Diabetic retinopathy (H)     Angioedema, initial encounter     Diabetic neuropathy (H)     Type 2 diabetes mellitus with diabetic neuropathy, with long-term current use of insulin (H)     Past Medical History:   Diagnosis Date     Diabetes mellitus, type II (H)      Hypertension       Past Surgical History:   Procedure Laterality Date     APPENDECTOMY       BREAST CYST ASPIRATION       BREAST CYST ASPIRATION Right 1980     TONSILLECTOMY        Family History   Problem Relation Age of Onset     Breast cancer Mother 81     Colon cancer Brother 64     Breast cancer Paternal Aunt 65      Social History     Socioeconomic History     Marital status:      Spouse name: Not on file     Number of children: Not on file     Years of education: Not on file     Highest education level: Not on file   Occupational History     Not on file   Social Needs     Financial resource strain: Not on file     Food insecurity:     Worry: Not on file     Inability: Not on file     Transportation needs:     Medical: Not on file     Non-medical: Not on file   Tobacco Use     Smoking status: Former Smoker     Types: Cigarettes     Smokeless tobacco: Never Used   Substance and Sexual Activity     Alcohol use: No     Drug use: No     Sexual activity: Not on file   Lifestyle     Physical activity:     Days per week: Not on file     Minutes per session: Not on file     Stress: Not on file   Relationships     Social connections:     Talks on phone: Not on file     Gets together: Not on file     Attends Nondenominational service: Not on file     Active member of club or organization: Not on file     Attends meetings of clubs or organizations: Not on file     Relationship status: Not  "on file     Intimate partner violence:     Fear of current or ex partner: Not on file     Emotionally abused: Not on file     Physically abused: Not on file     Forced sexual activity: Not on file   Other Topics Concern     Not on file   Social History Narrative     Not on file      Current Outpatient Medications   Medication Sig Dispense Refill     alendronate (FOSAMAX) 70 MG tablet Take 1 tablet (70 mg total) by mouth every 7 days. Take in the morning on an empty stomach with a full glass of water 30 minutes before food 12 tablet 3     amLODIPine (NORVASC) 5 MG tablet Take 1 tablet (5 mg total) by mouth daily. 90 tablet 3     aspirin 81 mg TbEF Take 1 tablet by mouth daily.       aspirin-acetaminophen-caffeine (EXCEDRIN MIGRAINE) 250-250-65 mg per tablet Take 1 tablet by mouth every 6 (six) hours as needed for pain.       BD ULTRA-FINE SHERRY PEN NEEDLES 32 gauge x 5/32\" Ndle USE TWICE A DAY WITH INSULIN  each 1     blood glucose test strips Use 1 each As Directed 3 (three) times a day. Dispense brand per patient's insurance at pharmacy discretion. 300 strip 3     calcium citrate-vitamin D (CALCIUM CITRATE +) 315-200 mg-unit per tablet Take 1 tablet by mouth 2 (two) times a day.       DOCUSATE CALCIUM (STOOL SOFTENER ORAL) Take 100 mg by mouth daily.        gabapentin (NEURONTIN) 300 MG capsule Take 1 capsule (300 mg total) by mouth at bedtime. 30 capsule 1     insulin degludec 100 unit/mL (3 mL) InPn Inject 22 Units under the skin daily. 15 mL 11     lancets 30 gauge Misc Use 1 each As Directed 3 (three) times a day. Use one lancet with your Verio meter to check blood sugar as directed 300 each 3     magnesium oxide 250 mg Tab Take 250 mg by mouth daily.       metFORMIN (GLUCOPHAGE) 1000 MG tablet Take 1 tablet (1,000 mg total) by mouth 2 (two) times a day with meals. 180 tablet 3     metoprolol succinate (TOPROL XL) 50 MG 24 hr tablet Take 1 tablet (50 mg total) by mouth daily. 90 tablet 3     MULTIVITAMIN " "(MULTIPLE VITAMIN ESSENTIAL ORAL) Take 1 tablet by mouth daily.        pen needle, diabetic (BD ULTRA-FINE SHERRY PEN NEEDLE) 32 gauge x 5/32\" Ndle Use twice daily with insulin 300 each 3     liraglutide (VICTOZA) 0.6 mg/0.1 mL (18 mg/3 mL) PnIj injection Inject 1.8 mg under the skin daily for 90 doses. With or without food. 6 mL 11     No current facility-administered medications for this visit.       Objective:   Vital Signs:   Visit Vitals  /82 (Patient Site: Left Arm, Patient Position: Sitting, Cuff Size: Adult Regular)   Pulse 80   Temp 97.9  F (36.6  C) (Oral)   Resp 20   Ht 5' 4.5\" (1.638 m)   Wt 167 lb 11.2 oz (76.1 kg)   Breastfeeding? No   BMI 28.34 kg/m         VisionScreening:  No exam data present     PHYSICAL EXAM  General Appearance: Alert, cooperative, no distress, appears stated age  Head: Normocephalic, without obvious abnormality, atraumatic  Eyes: PERRL, conjunctiva/corneas clear, EOM's intact  Ears: Normal TM's and external ear canals, both ears  Nose: Nares normal, septum midline,mucosa normal, no drainage  Throat: Lips, mucosa, and tongue normal; teeth and gums normal  Neck: Supple, symmetrical, trachea midline, no adenopathy;  thyroid: not enlarged, symmetric, no tenderness/mass/nodules  Back: Symmetric, no curvature, ROM normal, no CVA tenderness  Lungs: Clear to auscultation bilaterally, respirations unlabored  Heart: Regular rate and rhythm, S1 and S2 normal, no murmur, rub, or gallop,  Abdomen: Soft, non-tender, bowel sounds active all four quadrants,  no masses, no organomegaly  Musculoskeletal: Normal range of motion. No joint swelling or deformity.   Extremities: Extremities normal, atraumatic, no cyanosis or edema  Skin: Skin color, texture, turgor normal, no rashes or lesions  Lymph nodes: Cervical, supraclavicular, and axillary nodes normal  Neurologic: He is alert.  Normal speech.  No focal deficits.  Normal deep tendon reflexes.   Psychiatric: He has a normal mood and affect. "       Assessment Results 10/30/2019   Activities of Daily Living No help needed   Instrumental Activities of Daily Living No help needed   Mini Cog Total Score 5   Some recent data might be hidden     A Mini-Cog score of 0-2 suggests the possibility of dementia, score of 3-5 suggests no dementia    Identified Health Risks:     The patient was provided with written information regarding signs of hearing loss.  Information on urinary incontinence and treatment options given to patient.  Patient's advanced directive was discussed and I am comfortable with the patient's wishes.

## 2021-06-02 NOTE — TELEPHONE ENCOUNTER
Who is calling:  Patient   Reason for Call:  I am scheduled for a colonoscopy on Friday and I need to know what I should due with my diabetic and other medications the days of the prep and the day of the procedure.  I am also going to check with the GI provider as to my diabetic neuropathy that is triggered by dehydration and if IV fluids would be recommended with this procedure. Please advise on medications and diabetic neuropathy for procedure.   Date of last appointment with primary care: 9/19/19  Okay to leave a detailed message: Yes

## 2021-06-03 VITALS — WEIGHT: 175.5 LBS | BODY MASS INDEX: 29.66 KG/M2

## 2021-06-03 VITALS
HEIGHT: 65 IN | HEART RATE: 73 BPM | OXYGEN SATURATION: 97 % | BODY MASS INDEX: 28.66 KG/M2 | DIASTOLIC BLOOD PRESSURE: 80 MMHG | SYSTOLIC BLOOD PRESSURE: 122 MMHG | WEIGHT: 172 LBS

## 2021-06-03 VITALS
DIASTOLIC BLOOD PRESSURE: 80 MMHG | OXYGEN SATURATION: 97 % | WEIGHT: 173 LBS | HEART RATE: 68 BPM | SYSTOLIC BLOOD PRESSURE: 152 MMHG | HEIGHT: 65 IN | BODY MASS INDEX: 28.82 KG/M2

## 2021-06-03 VITALS
TEMPERATURE: 98.8 F | BODY MASS INDEX: 28.18 KG/M2 | SYSTOLIC BLOOD PRESSURE: 136 MMHG | DIASTOLIC BLOOD PRESSURE: 74 MMHG | HEART RATE: 72 BPM | HEIGHT: 65 IN | RESPIRATION RATE: 16 BRPM | WEIGHT: 169.13 LBS

## 2021-06-03 VITALS
TEMPERATURE: 97.9 F | HEIGHT: 65 IN | BODY MASS INDEX: 27.94 KG/M2 | SYSTOLIC BLOOD PRESSURE: 124 MMHG | RESPIRATION RATE: 20 BRPM | WEIGHT: 167.7 LBS | HEART RATE: 80 BPM | DIASTOLIC BLOOD PRESSURE: 82 MMHG

## 2021-06-03 VITALS — BODY MASS INDEX: 28.01 KG/M2 | HEIGHT: 65 IN | WEIGHT: 168.1 LBS

## 2021-06-03 VITALS — WEIGHT: 172.1 LBS | BODY MASS INDEX: 29.08 KG/M2

## 2021-06-03 NOTE — TELEPHONE ENCOUNTER
Since starting the tresiba she has been having lows in the 45 during the night and highs in the 300's.     Pt is not sure if this medication is going to work for her. She feels that it is worse than when she was on the lantus.    The patient has been emailing Katharina through [facility] and they have been working on adjusting the medication.    The patient wanted provider to know and would like to know what he thinks.    Pt has an appointment on Tuesday for a pre-op.    Please advise.    Kristie Vargas, RN   [facility] Medication Refill and Triage Nurse  12:06 PM  11/5/2019        Reason for Disposition    Caller has NON-URGENT medication question about med that PCP prescribed and triager unable to answer question    Protocols used: MEDICATION QUESTION CALL-A-

## 2021-06-03 NOTE — PROGRESS NOTES
Preoperative Exam    Scheduled Procedure: Cataract RT 11/13/19 and LT 12/05/19   Surgery Date:  11/13/19 RT and 12/05/19 LT  Surgery Location: [facility], [place], fax [phone]    Surgeon:  Dr. Hancock    Assessment/Plan:     1. Encounter for preoperative examination for general surgical procedure  She is cleared to proceed with cataract surgery as scheduled.    2. Cataract of both eyes, unspecified cataract type    3. Type 2 diabetes mellitus with diabetic neuropathy, with long-term current use of insulin (H)  She is back on Lantus 22 units daily.  She is also taking metformin and Victoza.  She will continue to monitor her blood glucose levels closely.  She will continue to eat healthy foods and get regular exercise.  She is going to hold the metformin, Victoza and Lantus until after her surgery which she reports is scheduled in the morning.  She will restart these medications after surgery when she is eating again.  - metFORMIN (GLUCOPHAGE) 1000 MG tablet; Take 1 tablet (1,000 mg total) by mouth 2 (two) times a day with meals.  Dispense: 180 tablet; Refill: 3  - gabapentin (NEURONTIN) 300 MG capsule; Take 1 capsule (300 mg total) by mouth at bedtime.  Dispense: 90 capsule; Refill: 3  - insulin glargine (LANTUS SOLOSTAR U-100 INSULIN) 100 unit/mL (3 mL) pen; Inject 22 Units under the skin daily. Do not mix Lantus with any other insulin  Dispense: 5 adj dose pen; Refill: 11    4. Benign Essential Hypertension  Blood pressure is stable and under good control metoprolol and and amlodipine.  She will continue to take these prior to surgery as prescribed.  - metoprolol succinate (TOPROL XL) 50 MG 24 hr tablet; Take 1 tablet (50 mg total) by mouth daily.  Dispense: 90 tablet; Refill: 3    5. Diabetic retinopathy associated with type 2 diabetes mellitus, macular edema presence unspecified, unspecified laterality, unspecified retinopathy severity (H)  She has a history of central retinal vein occlusion with  edema on the right.  She will continue to follow-up closely with her ophthalmology specialist.  She will continue her current medications and hold these on the morning of surgery, but restart shortly after when she is eating again.    6. Diabetic polyneuropathy associated with type 2 diabetes mellitus (H)  We will continue to work on keeping her blood glucose levels under control.  She will be due for her next hemoglobin A1c in approximately 1.5 months    7. Other hyperlipidemia  Stable.  Unfortunately, she does not tolerate statins.  She will continue to work on eating healthy foods.    I personally reviewed the ophthalmology consultation note from 9/30/2019 including recent lab results over the past 6 months.    Surgical Procedure Risk: Low (reported cardiac risk generally < 1%)  Have you had prior anesthesia?: Yes  Have you or any family members had a previous anesthesia reaction:  Yes: nausea-usually gets Zofran.  Do you or any family members have a history of a clotting or bleeding disorder?: No  Cardiac Risk Assessment: no increased risk for major cardiac complications    APPROVAL GIVEN to proceed with proposed procedure, without further diagnostic evaluation        Functional Status: Independent  Patient plans to recover at home with family.     Subjective:      Nancy Hughes is a 69 y.o. female who presents for a preoperative consultation.  She has a history of worsening vision secondary to bilateral cataracts.  She is scheduled to have cataract surgery for this on 11/13 and 12/5. She has a medical history significant for type 2 diabetes, hypertension, hyperlipidemia peripheral neuropathy, diabetic retinopathy and osteopenia.  She recently switched back to Lantus after a short trial on Tresiba due to significant hypoglycemic episodes overnight and hyperglycemic episodes during the day.  Since switching back this issue has improved.  She is currently on Lantus 22 units in the morning. She continues to take  "metformin 1000 mg twice daily and Victoza.  On 9/19/2019 her hemoglobin A1c was 8.1% and CMP were normal.  She continues to take metoprolol XL 25 mg daily and amlodipine 5 mg daily for blood pressure control. On 6/19/2019 her LDL was 116, B 12 and magnesium levels were normal.  On 9/27/2019 she was seen by an osteoporosis specialist.  Her DEXA scan shows significant decline and it was recommended that she start Fosamax.  She is waiting to start this until after her cataract surgeries this fall.  Lab testing that day showed a normal TSH, vitamin the, PTH and all tissue transglutaminase antibodies.    All other systems reviewed and are negative, other than those listed in the HPI.    Pertinent History  Do you have difficulty breathing or chest pain after walking up a flight of stairs: No  History of obstructive sleep apnea: No  Steroid use in the last 6 months: No  Frequent Aspirin/NSAID use: No  Prior Blood Transfusion: No  Prior Blood Transfusion Reaction: No  If for some reason prior to, during or after the procedure, if it is medically indicated, would you be willing to have a blood transfusion?:  There is no transfusion refusal.    Current Outpatient Medications   Medication Sig Dispense Refill     amLODIPine (NORVASC) 5 MG tablet Take 1 tablet (5 mg total) by mouth daily. 90 tablet 3     aspirin 81 mg TbEF Take 1 tablet by mouth daily.       aspirin-acetaminophen-caffeine (EXCEDRIN MIGRAINE) 250-250-65 mg per tablet Take 1 tablet by mouth every 6 (six) hours as needed for pain.       BD ULTRA-FINE SHERRY PEN NEEDLES 32 gauge x 5/32\" Ndle USE TWICE A DAY WITH INSULIN  each 1     blood glucose test strips Use 1 each As Directed 3 (three) times a day. Dispense brand per patient's insurance at pharmacy discretion. 300 strip 3     calcium citrate-vitamin D (CALCIUM CITRATE +) 315-200 mg-unit per tablet Take 1 tablet by mouth 2 (two) times a day.       DOCUSATE CALCIUM (STOOL SOFTENER ORAL) Take 100 mg by mouth " "daily.        gabapentin (NEURONTIN) 300 MG capsule Take 1 capsule (300 mg total) by mouth at bedtime. 30 capsule 1     lancets 30 gauge Misc Use 1 each As Directed 3 (three) times a day. Use one lancet with your Verio meter to check blood sugar as directed 300 each 3     liraglutide (VICTOZA) 0.6 mg/0.1 mL (18 mg/3 mL) PnIj injection Inject 1.8 mg under the skin daily for 90 doses. With or without food. 6 mL 11     magnesium oxide 250 mg Tab Take 250 mg by mouth daily.       metFORMIN (GLUCOPHAGE) 1000 MG tablet Take 1 tablet (1,000 mg total) by mouth 2 (two) times a day with meals. 180 tablet 3     metoprolol succinate (TOPROL XL) 50 MG 24 hr tablet Take 1 tablet (50 mg total) by mouth daily. 90 tablet 3     MULTIVITAMIN (MULTIPLE VITAMIN ESSENTIAL ORAL) Take 1 tablet by mouth daily.        pen needle, diabetic (BD ULTRA-FINE SHERRY PEN NEEDLE) 32 gauge x 5/32\" Ndle Use twice daily with insulin 300 each 3     alendronate (FOSAMAX) 70 MG tablet Take 1 tablet (70 mg total) by mouth every 7 days. Take in the morning on an empty stomach with a full glass of water 30 minutes before food 12 tablet 3     insulin glargine (BASAGLAR KWIKPEN) 100 unit/mL (3 mL) pen Inject 22 Units under the skin daily. 5 pen 11     No current facility-administered medications for this visit.         Allergies   Allergen Reactions     Lisinopril Angioedema     Possible angioedema vs oral cellulitis (admitted 11/07/2018)     Clindamycin      Statins-Hmg-Coa Reductase Inhibitors Myalgia       Patient Active Problem List   Diagnosis     Hyperlipemia     Benign Essential Hypertension     Osteopenia of multiple sites     Overweight     Diabetic retinopathy (H)     Angioedema, initial encounter     Diabetic neuropathy (H)     Type 2 diabetes mellitus with diabetic neuropathy, with long-term current use of insulin (H)       Past Medical History:   Diagnosis Date     Diabetes mellitus, type II (H)      Hypertension        Past Surgical History: " "  Procedure Laterality Date     APPENDECTOMY       BREAST CYST ASPIRATION       BREAST CYST ASPIRATION Right 1980     TONSILLECTOMY         Social History     Socioeconomic History     Marital status:      Spouse name: Not on file     Number of children: Not on file     Years of education: Not on file     Highest education level: Not on file   Occupational History     Not on file   Social Needs     Financial resource strain: Not on file     Food insecurity:     Worry: Not on file     Inability: Not on file     Transportation needs:     Medical: Not on file     Non-medical: Not on file   Tobacco Use     Smoking status: Former Smoker     Types: Cigarettes     Smokeless tobacco: Never Used   Substance and Sexual Activity     Alcohol use: No     Drug use: No     Sexual activity: Not on file   Lifestyle     Physical activity:     Days per week: Not on file     Minutes per session: Not on file     Stress: Not on file   Relationships     Social connections:     Talks on phone: Not on file     Gets together: Not on file     Attends Druze service: Not on file     Active member of club or organization: Not on file     Attends meetings of clubs or organizations: Not on file     Relationship status: Not on file     Intimate partner violence:     Fear of current or ex partner: Not on file     Emotionally abused: Not on file     Physically abused: Not on file     Forced sexual activity: Not on file   Other Topics Concern     Not on file   Social History Narrative     Not on file             Objective:     Vitals:    11/11/19 1544   BP: 136/74   Pulse: 72   Resp: 16   Temp: 98.8  F (37.1  C)   TempSrc: Oral   Weight: 169 lb 2 oz (76.7 kg)   Height: 5' 4.5\" (1.638 m)         Physical Exam:  General Appearance: Alert, cooperative, no distress, appears stated age  Head: Normocephalic, without obvious abnormality, atraumatic  Eyes: PERRL, conjunctiva/corneas clear, EOM's intact  Ears: Normal TM's and external ear canals, " both ears  Nose: Nares normal, septum midline,mucosa normal, no drainage  Throat: Lips, mucosa, and tongue normal; teeth and gums normal  Neck: Supple, symmetrical, trachea midline, no adenopathy;  thyroid: not enlarged, symmetric, no tenderness/mass/nodules  Back: Symmetric, no curvature, ROM normal, no CVA tenderness  Lungs: Clear to auscultation bilaterally, respirations unlabored  Heart: Regular rate and rhythm, S1 and S2 normal, no murmur, rub, or gallop,  Abdomen: Soft, non-tender, bowel sounds active all four quadrants,  no masses, no organomegaly  Musculoskeletal: Normal range of motion. No joint swelling or deformity.   Extremities: Extremities normal, atraumatic, no cyanosis or edema  Skin: Skin color, texture, turgor normal, no rashes or lesions  Lymph nodes: Cervical, supraclavicular, and axillary nodes normal  Neurologic: He is alert.  Normal speech.  No focal deficits.  Normal deep tendon reflexes.   Psychiatric: He has a normal mood and affect.       There are no Patient Instructions on file for this visit.        Labs:  No labs were ordered during this visit    Immunization History   Administered Date(s) Administered     Hep B, historic 01/20/2004, 02/24/2004, 06/22/2004     Influenza D5s2-40, 12/16/2009     Influenza high dose,seasonal,PF, 65+ yrs 08/15/2019     Influenza, inj, historic,unspecified 08/23/2013, 08/31/2015, 09/02/2016, 08/23/2018     Pneumo Conj 13-V (2010&after) 07/24/2015     Pneumo Polysac 23-V 08/03/2010, 10/30/2019     Td, Adult, Absorbed 08/06/1997, 07/10/2007     Tdap 02/18/2011, 02/05/2014     ZOSTER, LIVE 06/13/2012     ZOSTER, RECOMBINANT, IM 02/04/2019, 05/06/2019           Electronically signed by Aaron Hummel DO 11/11/19 3:47 PM

## 2021-06-03 NOTE — TELEPHONE ENCOUNTER
RN Assessment/Reason for Call:   Okay to leave Detailed Message  Patient calling in, cataract surgery 11/13/19.  Grandson 5 yrs has pink eye and sinus infection was with him yesterday, he is on antibiotics.  Diabetic.    RN Action/Disposition:  Call surgeon re: pink eye.  Call back if worse symptoms  Discussed home care measures.  Offered flu immunization; had.TDAP up to date..  Agrees to plan.     Kamilah Ding RN    [facility] Triage/med refill  11/9/2019  8:24 AM

## 2021-06-03 NOTE — TELEPHONE ENCOUNTER
I spoke with Nancy who is feeling frustrated about ongoing hypoglycemic episodes she is experiencing overnight on the Tresiba.  She is also experiencing several high readings throughout the day, some of which are in the 300s.  She would like to go back to Lantus 22 units daily.  I agreed with her that she can go back to this, but expressed concern that her blood sugars were not adequately controlled when she was on the Lantus with Victoza and metformin.  She is going to go back on this regimen and monitor blood glucose levels closely.  If there is still running high we should consider starting a GLP-1 such as Ozempic.  She following up with me in 1 week for preoperative exam and we can discuss this further then.

## 2021-06-04 VITALS
SYSTOLIC BLOOD PRESSURE: 165 MMHG | TEMPERATURE: 99.1 F | HEART RATE: 91 BPM | DIASTOLIC BLOOD PRESSURE: 90 MMHG | RESPIRATION RATE: 16 BRPM | OXYGEN SATURATION: 98 %

## 2021-06-04 VITALS
HEIGHT: 65 IN | SYSTOLIC BLOOD PRESSURE: 136 MMHG | BODY MASS INDEX: 28.06 KG/M2 | TEMPERATURE: 98.4 F | WEIGHT: 168.4 LBS | DIASTOLIC BLOOD PRESSURE: 80 MMHG | HEART RATE: 68 BPM | RESPIRATION RATE: 17 BRPM

## 2021-06-04 NOTE — PROGRESS NOTES
Assessment / Impression     1. Type 2 diabetes mellitus with diabetic neuropathy, with long-term current use of insulin (H)  Glycosylated Hemoglobin A1c    liraglutide (VICTOZA) 0.6 mg/0.1 mL (18 mg/3 mL) PnIj injection   2. Benign Essential Hypertension     3. Diabetic polyneuropathy associated with type 2 diabetes mellitus (H)     4. Other hyperlipidemia     5. Type 2 diabetes mellitus, uncontrolled (H)           Plan:     Her hemoglobin A1c is 8.5%.  I recommended she increase the Lantus to 24 units daily and continue metformin 1000 mg twice daily and Victoza at the current dose.  She may need to continue bumping up the Lantus over the next few weeks/months to achieve better glucose control.  She will continue to work on eating healthy foods and getting regular exercise.  Her blood pressures well controlled on metoprolol XL 25 mg daily and amlodipine 5 mg daily.  She will follow-up in 3 months for her next diabetes check.  She may be seen sooner if needed.    Subjective:      HPI: Nancy Hughes is a 69 y.o. female who presents to the clinic for diabetes follow-up visit. She has a medical history significant for type 2 diabetes, hypertension, hyperlipidemia peripheral neuropathy, diabetic retinopathy and osteopenia.  She recently switched back to Lantus after a short trial on Tresiba due to significant hypoglycemic episodes overnight and hyperglycemic episodes during the day.  Since switching back this issue has improved.  She is currently on Lantus 22 units in the morning. She continues to take metformin 1000 mg twice daily and Victoza.  On 9/19/2019 her hemoglobin A1c was 8.1% and CMP were normal.  She continues to take metoprolol XL 25 mg daily and amlodipine 5 mg daily for blood pressure control. On 6/19/2019 her LDL was 116, B 12 and magnesium levels were normal.  She feels like her blood sugars have been better since earlier this fall.  She is having less hypoglycemic episodes and less hypoglycemic episodes.   "She is try to eat healthy foods and she stays physically active.  She was also recently started on Fosamax by an osteoporosis specialist.  She is tolerating this well.        Review of Systems  All other systems reviewed and are negative.     Social History     Tobacco Use   Smoking Status Former Smoker     Types: Cigarettes   Smokeless Tobacco Never Used       Family History   Problem Relation Age of Onset     Breast cancer Mother 81     Colon cancer Brother 64     Breast cancer Paternal Aunt 65       Objective:     /80   Pulse 68   Temp 98.4  F (36.9  C) (Oral)   Resp 17   Ht 5' 4.5\" (1.638 m)   Wt 168 lb 6.4 oz (76.4 kg)   BMI 28.46 kg/m    Physical Examination: General appearance - alert, well appearing, and in no distress  Eyes: pupils equal and reactive, extraocular eye movements intact  Mouth: mucous membranes moist, pharynx normal without lesions  Neck: supple, no significant adenopathy  Lungs: clear to auscultation, no wheezes, rales or rhonchi, symmetric air entry  Heart: normal rate, regular rhythm, normal S1, S2, no murmurs, rubs, clicks or gallops  Abdomen: soft, nontender, nondistended, no masses or organomegaly  Neurological: alert, oriented, normal speech, no focal findings or movement disorder noted.    Extremities: No edema, no clubbing or cyanosis  Psychiatric: Normal affect. Does not appear anxious or depressed.    Recent Results (from the past 168 hour(s))   Glycosylated Hemoglobin A1c   Result Value Ref Range    Hemoglobin A1c 8.5 (H) 3.5 - 6.0 %       Current Outpatient Medications   Medication Sig     alendronate (FOSAMAX) 70 MG tablet Take 1 tablet (70 mg total) by mouth every 7 days. Take in the morning on an empty stomach with a full glass of water 30 minutes before food     amLODIPine (NORVASC) 5 MG tablet Take 1 tablet (5 mg total) by mouth daily.     aspirin 81 mg TbEF Take 1 tablet by mouth daily.     aspirin-acetaminophen-caffeine (EXCEDRIN MIGRAINE) 250-250-65 mg per " "tablet Take 1 tablet by mouth every 6 (six) hours as needed for pain.     BD ULTRA-FINE SHERRY PEN NEEDLES 32 gauge x 5/32\" Ndle USE TWICE A DAY WITH INSULIN PEN     blood glucose test strips Use 1 each As Directed 3 (three) times a day. Dispense brand per patient's insurance at pharmacy discretion.     calcium citrate-vitamin D (CALCIUM CITRATE +) 315-200 mg-unit per tablet Take 1 tablet by mouth 2 (two) times a day.     DOCUSATE CALCIUM (STOOL SOFTENER ORAL) Take 100 mg by mouth daily.      gabapentin (NEURONTIN) 300 MG capsule Take 1 capsule (300 mg total) by mouth at bedtime.     insulin glargine (LANTUS SOLOSTAR U-100 INSULIN) 100 unit/mL (3 mL) pen Inject 22 Units under the skin daily. Do not mix Lantus with any other insulin     lancets 30 gauge Misc Use 1 each As Directed 3 (three) times a day. Use one lancet with your Verio meter to check blood sugar as directed     magnesium oxide 250 mg Tab Take 250 mg by mouth daily.     metFORMIN (GLUCOPHAGE) 1000 MG tablet Take 1 tablet (1,000 mg total) by mouth 2 (two) times a day with meals.     metoprolol succinate (TOPROL XL) 50 MG 24 hr tablet Take 1 tablet (50 mg total) by mouth daily.     MULTIVITAMIN (MULTIPLE VITAMIN ESSENTIAL ORAL) Take 1 tablet by mouth daily.      pen needle, diabetic (BD ULTRA-FINE SHERRY PEN NEEDLE) 32 gauge x 5/32\" Ndle Use twice daily with insulin     ketorolac (ACULAR) 0.5 % ophthalmic solution INSTILL 1 DROP INTO LEFT EYE 4 TIMES A DAY START AFTER SURGERY AND USE UNTIL BOTTLE IS FINISHED.     liraglutide (VICTOZA) 0.6 mg/0.1 mL (18 mg/3 mL) PnIj injection Inject 1.8 mg under the skin daily for 90 doses. With or without food.     prednisoLONE acetate (PRED-FORTE) 1 % ophthalmic suspension INSTILL 1 DROP INTO LEFT EYE 4 TIMES A DAY FOR ONE WEEK AND THEN TAPER. START AFTER SURGERY     "

## 2021-06-05 VITALS
HEIGHT: 65 IN | TEMPERATURE: 96.8 F | WEIGHT: 165.2 LBS | SYSTOLIC BLOOD PRESSURE: 138 MMHG | DIASTOLIC BLOOD PRESSURE: 80 MMHG | HEART RATE: 71 BPM | RESPIRATION RATE: 20 BRPM | BODY MASS INDEX: 27.52 KG/M2 | OXYGEN SATURATION: 97 %

## 2021-06-05 VITALS
BODY MASS INDEX: 29.35 KG/M2 | WEIGHT: 175 LBS | HEART RATE: 75 BPM | SYSTOLIC BLOOD PRESSURE: 152 MMHG | DIASTOLIC BLOOD PRESSURE: 84 MMHG | OXYGEN SATURATION: 99 % | TEMPERATURE: 97.2 F

## 2021-06-06 NOTE — TELEPHONE ENCOUNTER
Who is calling:  Patient   Reason for Call:  Patient is calling for providers recommendation for her about Coronavirus what percussions does she need to take if she goes out like shopping , [employer] . Please advise .  Date of last appointment with primary care: 12/19/20  Okay to leave a detailed message: No

## 2021-06-07 NOTE — PROGRESS NOTES
[facility] Discharge Summary  Patient Name: Nancy Hughes  Date: 4/3/2020  Referral Diagnosis:   1. Diabetic polyneuropathy associated with type 2 diabetes mellitus (H) E11.42     2. Cramp of both lower extremities R25.2     3. Cramps of lower extremity R25.2        Referring provider: Aaron Toledo*  Visit Diagnosis:   1. Diabetic polyneuropathy associated with type 2 diabetes mellitus (H)     2. Cramp of both lower extremities     3. Cramps of lower extremity         Goals:  Pt. will demonstrate/verbalize independence in self-management of condition in : 6 weeks;12 weeks. Goal met  Pt. will be independent with home exercise program in : 6 weeks;12 weeks. Goal met  Pt. will have improved quality of sleep: waking less times/night;with less pain;Comment;in 12 weeks Goal unknown.  Comment:: The patient will notice less cramping when going to the bathroom at night only 3 times per week.  Patient will sit: 30 minutes;for eating;for watching TV;with less pain;with less difficultty;in 6 weeks;in 12 weeks.  Goal progressing    Patient was seen for 3 visits from 6/25/19 to 7/19/19 with 0 missed appointments.  The patient attended therapy initially, but did not finish the therapy sessions prescribed.  Goals were not fully achieved. Explanation for goals not achieved: The patient was only seen for 3 visits and then did not finish she was progressing well the last time that I saw her.  Patient received a home program consisting of   Exercise #1: standing gastroc stretch using a towel roll under the ball of her foot.  Comment #1: 1-3 minutes until the patient no longer feels a stretch  Exercise #2: sitting hamstring stretch sitting weith the fot releaxed  and stretch 30 seconds to 3 minutes until the muscles relax.  Exercise #3: patient to hold the muscle until it relaxes 3-5 minutes or more instructed the patient's  to do this today.  Exercise #4: bent knee fall outs  Comment #4: x10 B  Exercise #5:  standing or SL ITB stretch  Comment #5: 1' B       Therapy will be discontinued at this time.  The patient will need a new referral to resume.    Thank you for your referral.  Claudine López PT  4/3/2020  6:47 AM

## 2021-06-08 NOTE — TELEPHONE ENCOUNTER
Medication Question or Clarification  Who is calling: Patient calling  What medication are you calling about (include dose and sig)?:      insulin glargine (LANTUS SOLOSTAR U-100 INSULIN) 100 unit/mL (3 mL) pen 24 Units, Subcutaneous, DAILY         Who prescribed the medication?: Dr. Lozano   What is your question/concern ?: Pt requesting 90 day supply of Victoza & Lantus( refills that were sent 5/12/20 were cancelled by Pharmacy.  NEED new rx for  Victoza & Lantus.  RX for Lantus must say 10 pens to be 90 day supply)  please send new RX asap  for both Victoza & Lantus    Requested Pharmacy: [facility]  Okay to leave a detailed message?: Yes

## 2021-06-08 NOTE — PROGRESS NOTES
"Nancy Hughes is a 69 y.o. female who is being evaluated via a billable video visit.      The patient has been notified of following:     \"This video visit will be conducted via a call between you and your physician/provider. We have found that certain health care needs can be provided without the need for an in-person physical exam.  This service lets us provide the care you need with a video conversation.  If a prescription is necessary we can send it directly to your pharmacy.  If lab work is needed we can place an order for that and you can then stop by our lab to have the test done at a later time.    Video visits are billed at different rates depending on your insurance coverage. Please reach out to your insurance provider with any questions.    If during the course of the call the physician/provider feels a video visit is not appropriate, you will not be charged for this service.\"    Patient has given verbal consent to a Video visit? Yes    Patient would like to receive their AVS by AVS Preference: Tameka.    Patient would like the video invitation sent by: Text to cell phone: [phone]    Will anyone else be joining your video visit? No      Video Start Time: 10: 14    Additional provider notes:     Assessment / Impression     1. Type 2 diabetes mellitus with diabetic neuropathy, with long-term current use of insulin (H)  insulin glargine (LANTUS SOLOSTAR U-100 INSULIN) 100 unit/mL (3 mL) pen    liraglutide (VICTOZA) 0.6 mg/0.1 mL (18 mg/3 mL) PnIj injection    Glycosylated Hemoglobin A1c    Microalbumin, Random Urine   2. Diabetic retinopathy associated with type 2 diabetes mellitus, macular edema presence unspecified, unspecified laterality, unspecified retinopathy severity (H)     3. Benign Essential Hypertension     4. Other hyperlipidemia  Comprehensive Metabolic Panel    Lipid Cascade         Plan:     She is going to come to the lab to have the above fasting labs checked next week.  We did not " change any medication doses today, but will consider changes once the results are written.  I encouraged her to keep getting regular exercise and eating healthy foods.  She was given refills of medications that are running low.    Subjective:      HPI: Nancy Hughes is a 69 y.o. female who presents to the clinic for a video visit to follow-up regarding her chronic medical conditions. She has a medical history significant for type 2 diabetes, hypertension, hyperlipidemia, peripheral neuropathy, diabetic retinopathy and osteopenia. She is currently on Lantus 24 units in the morning, metformin 1000 mg twice daily and Victoza.  She feels like her blood sugars have been lower since her last check in December, but she still has occasional high readings.  Her morning fasting ranges the past week has been between 48 and 174.  On 12/19/2019 her hemoglobin A1c was 8.5%.  She is trying to stay physically active most days of the week and she eats a healthy diet.  She periodically checks her blood pressure and reports that her systolic BP is usually in the 130s.  On 6/19/2019 her LDL cholesterol was 116.        Review of Systems  All other systems reviewed and are negative.     Social History     Tobacco Use   Smoking Status Former Smoker     Types: Cigarettes   Smokeless Tobacco Never Used       Family History   Problem Relation Age of Onset     Breast cancer Mother 81     Colon cancer Brother 64     Breast cancer Paternal Aunt 65       Objective:     There were no vitals taken for this visit.  Physical Examination: General appearance - alert, well appearing, and in no distress  Eyes: Eyes appear grossly normal.  Conjunctiva clear.  Extraocular eye movements intact  Mouth: mucous membranes moist  Lungs: Breathing is not labored.  No audible wheezes, rales or rhonchi  Neurological: alert, oriented, normal speech, no focal findings or movement disorder noted.    Psychiatric: Normal affect. Does not appear anxious or depressed.   "Insight and judgment intact    No results found for this or any previous visit (from the past 168 hour(s)).    Current Outpatient Medications   Medication Sig     alendronate (FOSAMAX) 70 MG tablet Take 1 tablet (70 mg total) by mouth every 7 days. Take in the morning on an empty stomach with a full glass of water 30 minutes before food     amLODIPine (NORVASC) 5 MG tablet Take 1 tablet (5 mg total) by mouth daily.     aspirin 81 mg TbEF Take 1 tablet by mouth daily.     aspirin-acetaminophen-caffeine (EXCEDRIN MIGRAINE) 250-250-65 mg per tablet Take 1 tablet by mouth every 6 (six) hours as needed for pain.     BD ULTRA-FINE SHERRY PEN NEEDLES 32 gauge x 5/32\" Ndle USE TWICE A DAY WITH INSULIN PEN     blood glucose test strips Use 1 each As Directed 3 (three) times a day. Dispense brand per patient's insurance at pharmacy discretion.     calcium citrate-vitamin D (CALCIUM CITRATE +) 315-200 mg-unit per tablet Take 1 tablet by mouth 2 (two) times a day.     DOCUSATE CALCIUM (STOOL SOFTENER ORAL) Take 100 mg by mouth daily.      gabapentin (NEURONTIN) 300 MG capsule Take 1 capsule (300 mg total) by mouth at bedtime.     insulin glargine (LANTUS SOLOSTAR U-100 INSULIN) 100 unit/mL (3 mL) pen Inject 24 Units under the skin daily. Do not mix Lantus with any other insulin     lancets 30 gauge Misc Use 1 each As Directed 3 (three) times a day. Use one lancet with your Verio meter to check blood sugar as directed     liraglutide (VICTOZA) 0.6 mg/0.1 mL (18 mg/3 mL) PnIj injection Inject 1.8 mg under the skin daily for 90 doses. With or without food.     magnesium oxide 250 mg Tab Take 250 mg by mouth daily.     metFORMIN (GLUCOPHAGE) 1000 MG tablet Take 1 tablet (1,000 mg total) by mouth 2 (two) times a day with meals.     metoprolol succinate (TOPROL XL) 50 MG 24 hr tablet Take 1 tablet (50 mg total) by mouth daily.     MULTIVITAMIN (MULTIPLE VITAMIN ESSENTIAL ORAL) Take 1 tablet by mouth daily.      pen needle, diabetic (BD " "ULTRA-FINE SHERRY PEN NEEDLE) 32 gauge x 5/32\" Ndle Use twice daily with insulin         Video-Visit Details    Type of service:  Video Visit    Video End Time (time video stopped): 10: 28  Originating Location (pt. Location): Home    Distant Location (provider location):  [facility]     Platform used for Video Visit: Enedelia Hummel, DO    "

## 2021-06-08 NOTE — TELEPHONE ENCOUNTER
Let's schedule a virtual visit this week.  I would appreciate it if you could call her to set this up.  I am pretty sure that she has a smart phone so that we can do a video visit.  You can let her know that it is quite simple to do and she will not need to download anything if I use D[employer]imity.  We can see how she is doing and figure out which labs if any need to be checked.  Thank you, DE

## 2021-06-08 NOTE — TELEPHONE ENCOUNTER
Requested Prescriptions     Pending Prescriptions Disp Refills     insulin glargine (LANTUS SOLOSTAR U-100 INSULIN) 100 unit/mL (3 mL) pen 10 adj dose pen 4     Sig: Inject 24 Units under the skin daily. Do not mix Lantus with any other insulin     liraglutide (VICTOZA) 0.6 mg/0.1 mL (18 mg/3 mL) PnIj injection 9 Syringe 4     Sig: Inject 1.8 mg under the skin daily for 90 doses. With or without food.

## 2021-06-08 NOTE — PROGRESS NOTES
"Assessment: Nancy is here today for a yearly consult regarding her diabetes management and to look into possible alternative therapies. She arrived unaccompanied and had her meter, log book and food journal with her. Nancy reported she takes all her diabetes medications as prescribed without missing / skipping any doses and checks her blood sugars 1-2 times daily or whenever she feels she may be \"low\"    BG Summary: Overall Lans blood sugars did not look too bad and were reflective of her A1c. There were some inconsistencies noted and Nancy stated this has been a frustration for her for some time - especially her lows , which she indicated were scaring her  2 weeks reviewed  FB - 199 mg/dL   Ave = 149 mg/dL  2 hr post B;  61 -250 mg/dL   Ave = 139 mg/dL  2 hr post L;  148 mg/dL  2 hr post D: (felt low)  58 mg/dL  When looking further into Nancy's lows - the amount of insulin administered & food eaten it was determined the cause was more insulin given than was needed. Hypoglycemia and treatment were reviewed with Nancy     Nutrition: in reviewing Nancy's food log - she does a good job recording her intake and keeping balance in her meals - she eats 3 meals daily and CHO intake is relatively consistent.     Plan: Recommend trying a GLP1 (VIctoza) with Nancy. Continue Metformin and Lantus and discontinue Novolog. Discussed with Endo NP and she felt this would be a safe option - Nancy was instructed to begin with 0.6mg daily and will send me her BG in 1 week for review - based upon her BG data will adjust doses as needed. She was asked to check her BG 3 times daily and continue monitoring CHO and remaining active.   This was reviewed with Dr Marta Hummel who was in agreement to the plan    Nancy was instructed to call with any questions/concerns that may arise      Subjective and Objective:      Nancy Hughes is referred by Dr Sosa for Diabetes Education.     Lab Results   Component Value Date    HGBA1C 7.5 (H) " 01/23/2017     Visit Vitals     Wt 171 lb 1.6 oz (77.6 kg)     BMI 29.1 kg/m2         Current diabetes medications:  Metformin 1000 mg PO BID, Lantus 14 units QD, Novolog 5 / 2-3 / 2-3 units subq    Goals       Activity            Incorporate 30 minutes exercise / activity into each day      MET 8/17/2016 1/25/2017              Monitor             1. Check blood sugars 3  times each day  FBG, pre lunch and 2 hour post lunch  For 1 week  Following week test FBG pre dinner and 2 hour post dinner  7/13/2016 MOSTLY MET  8/17/2016 1/25/2017    remember to bring meter and log book to all appointments          Nutrition             Eat 3 balanced meals each day - Monitor carb intake and limit to 3-4 choices (45-60 grams) per meal    Do not wait longer than 4-5 hours to eat something   7/13/2016   MET 8/17/2016   MOSTLY MET   1/25/2017                    Follow up:   Will continue to monitor BG until stabilized on Victoza dose      Education:     Monitoring   Meter (per above goals): Assessed, Discussed and Competent  Monitoring: Assessed, Discussed and Competent  BG goals: Assessed and Discussed    Nutrition Management  Nutrition Management: Assessed and Discussed  Weight: Assessed and Discussed  Portions/Balance: Assessed, Discussed and Competent  Carb ID/Count: Assessed and Discussed  Label Reading: Discussed  Heart Healthy Fats: Discussed  Menu Planning: Assessed, Discussed and Competent  Dining Out: Assessed and Discussed  Physical Activity: Assessed and Discussed  Medications: Assessed and Discussed  Orals: Assessed and Discussed  Injected Medications: Assessed, Discussed and Literature provided   Storage/Exp:Assessed, Discussed and Literature provided   Site Rotation: Assessed, Discussed and Competent   Sites Assessed: yes    Diabetes Disease Process: Assessed and Discussed    Acute Complications: Prevent, Detect, Treat:  Hypoglycemia: Assessed, Discussed and Needs instruction/review at follow-up  Hyperglycemia:  Assessed and Discussed  Sick Days: Discussed  Driving: Assessed and Discussed    Chronic Complications  Foot Care:Not addressed  Skin Care: Not addressed  Eye: Discussed  ABC: Discussed  Teeth:Not addressed  Goal Setting and Problem Solving: Not addressed  Barriers: Assessed and Discussed  Psychosocial Adjustments: Assessed and Discussed      Time spent with the patient: 30 minutes for diabetes education and counseling.   Previous Education: yes  Visit Type:DSMT  Hours Remaining: DSMT 1.5 and MNT 2      Katharina Vásquez RN BSN  Diabetes education  1/25/2017

## 2021-06-09 NOTE — TELEPHONE ENCOUNTER
"RN cannot approve Refill Request    RN can NOT refill this medication Protocol failed and NO refill given. Last office visit: 12/19/2019 Aaron Tloedo DO Last Physical: 11/11/2019 Last MTM visit: Visit date not found Last visit same specialty: 12/19/2019 Aaron Toledo DO.  Next visit within 3 mo: Visit date not found  Next physical within 3 mo: Visit date not found      Lexus Mendoza, [facility] Triage/Med Refill 7/10/2020    Requested Prescriptions   Pending Prescriptions Disp Refills     pen needle, diabetic (BD ULTRA-FINE SHERRY PEN NEEDLE) 32 gauge x 5/32\" Ndle 180 each 1     Sig: USE TWICE A DAY WITH INSULIN PEN       Diabetic Supplies Refill Protocol Failed - 7/9/2020  8:29 AM        Failed - Visit with PCP or prescribing provider visit in last 6 months     Last office visit with prescriber/PCP: 12/19/2019 Aaron Toledo DO OR same dept: Visit date not found OR same specialty: 12/19/2019 Aaron Toledo DO  Last physical: 11/11/2019 Last MTM visit: Visit date not found   Next visit within 3 mo: Visit date not found  Next physical within 3 mo: Visit date not found  Prescriber OR PCP: Aaron Hummel DO  Last diagnosis associated with med order: 1. Diabetes (H)  - pen needle, diabetic (BD ULTRA-FINE SHERRY PEN NEEDLE) 32 gauge x 5/32\" Ndle; USE TWICE A DAY WITH INSULIN PEN  Dispense: 180 each; Refill: 1    If protocol passes may refill for 12 months if within 3 months of last provider visit (or a total of 15 months).             Passed - A1C in last 6 months     Hemoglobin A1c   Date Value Ref Range Status   05/22/2020 8.6 (H) 3.5 - 6.0 % Final                     "

## 2021-06-09 NOTE — TELEPHONE ENCOUNTER
Last VV 05/12/2020  Aaron Hummel DO    Nancy Hughes is a 69 y.o. female who presents to the clinic for a video visit to follow-up regarding her chronic medical conditions. She has a medical history significant for type 2 diabetes, hypertension, hyperlipidemia, peripheral neuropathy, diabetic retinopathy and osteopenia. She is currently on Lantus 24 units in the morning, metformin 1000 mg twice daily and Victoza.  She feels like her blood sugars have been lower since her last check in December, but she still has occasional high readings.  Her morning fasting ranges the past week has been between 48 and 174.  On 12/19/2019 her hemoglobin A1c was 8.5%.  She is trying to stay physically active most days of the week and she eats a healthy diet.  She periodically checks her blood pressure and reports that her systolic BP is usually in the 130s.  On 6/19/2019 her LDL cholesterol was 116.     Return in about 3 months (around 8/12/2020) for Diabetes Follow up.       insulin glargine (LANTUS SOLOSTAR U-100 INSULIN) 100 unit/mL (3 mL) pen  10 adj dose pen  4  5/14/2020   No    Sig - Route: Inject 24 Units under the skin daily. Do not mix Lantus with any other insulin - Subcutaneous        liraglutide (VICTOZA) 0.6 mg/0.1 mL (18 mg/3 mL) PnIj injection  9 Syringe  4  5/14/2020 8/12/2020  No    Sig - Route: Inject 1.8 mg under the skin daily for 90 doses. With or without food. - Subcutaneous

## 2021-06-09 NOTE — PROGRESS NOTES
Assessment / Impression     1. Chest pain  XR Chest PA and Lateral    Electrocardiogram Perform and Read   2. Cough  XR Chest PA and Lateral   3. Benign Essential Hypertension     4. Upper back pain on left side         Plan:     She was given a prescription for a Z-Dangelo to treat for a probable bronchitis infection that is likely causing her symptoms.  I personally reviewed the chest x-ray images with her which did not show any obvious signs of pneumonia.  The formal radiology report is still pending.  I also personally reviewed the EKG which did not show evidence of ACS.  She may take Tylenol or ibuprofen for the chest and upper back pain symptoms which may be due to muscle strain from all the coughing.  If her symptoms do not improve she may return to the clinic.  I expressed my concern that her blood pressure is running high.  We will need to watch this closely.  She is not interested in increasing the lisinopril dose yet.  I personally reviewed recent labs over the past month as well as the diabetes education notes from 1/25 in which Victoza was added.    Subjective:      HPI: Nancy Hughes is a 66 y.o. female who presents to the clinic to be evaluated for left upper chest wall pain, left upper back pain and worsening cold symptoms.  She describes having a cold for the past 3 weeks.  She feels like the congestion is moving into her chest and is making her feel tired and cough more frequently.  She denies having fevers.  She denies feeling acutely short of breath.  She she has not been diaphoretic during the chest pain spells.  She has had a couple bouts of left upper chest wall and left upper back pain during this time.  She has a history of type 2 diabetes.  Her last hemoglobin A1c was 7.5%.  We recently added Victoza to her medications.  She also has a history of high blood pressure and is currently on lisinopril 10 mg daily.      Review of Systems  All other systems reviewed and are negative.    [facility]  "history: Mother: breast cancer, brother: colon cancer.      Objective:     Visit Vitals     /90 (Patient Site: Right Arm, Patient Position: Sitting, Cuff Size: Adult Regular)     Pulse 80     Temp 98.4  F (36.9  C) (Oral)     Resp 18     Ht 5' 4.3\" (1.633 m)     Wt 163 lb 12.8 oz (74.3 kg)     SpO2 98%     Breastfeeding No     BMI 27.85 kg/m2       General Appearance: Alert, cooperative, appears slightly fatigued.  Head: Normocephalic, without obvious abnormality, atraumatic.  Eyes: PERRL, conjunctiva/corneas clear, EOM's intact.  Ears: Normal TM's and external ear canals, both ears.  Chest: Trace tenderness in the left upper chest wall.  Back: Trace tenderness of the left upper back  Throat: Slightly erythematous. No exudates.  Neck: Supple, symmetrical, trachea midline, no lymphadenopathy.  Lungs: Clear to auscultation bilaterally, respirations unlabored.  Heart:: Regular rate and rhythm.  Extremities: Extremities normal, atraumatic, no cyanosis or edema.        Recent Results (from the past 168 hour(s))   Electrocardiogram Perform and Read   Result Value Ref Range    SYSTOLIC BLOOD PRESSURE  mmHg    DIASTOLIC BLOOD PRESSURE  mmHg    VENTRICULAR RATE 83 BPM    ATRIAL RATE 83 BPM    P-R INTERVAL 156 ms    QRS DURATION 84 ms    Q-T INTERVAL 378 ms    QTC CALCULATION (BEZET) 444 ms    P Axis 59 degrees    R AXIS 23 degrees    T AXIS 42 degrees    MUSE DIAGNOSIS       Normal sinus rhythm with sinus arrhythmia  Normal ECG  No previous ECGs available             "

## 2021-06-10 NOTE — TELEPHONE ENCOUNTER
Please let this patient know that the flu shot is not in yet, but should be available sometime in September.  I would suggest that she come in to have this done in late September or sometime in the first half of October.  Thank you, DE

## 2021-06-10 NOTE — TELEPHONE ENCOUNTER
Who is calling:  Patient  Reason for Call:  Patient wants to know when is the best time to get her flu shot. Patient wants a call back.  Date of last appointment with primary care: n/a  Okay to leave a detailed message: Yes  [phone]

## 2021-06-11 NOTE — TELEPHONE ENCOUNTER
Please let the patient know that it would be preferable for her to take Tylenol up to 1000 mg 4 times daily if needed for pain.  She could take ibuprofen, but this get metabolized through the kidneys and she had some evidence of protein in the urine the last time it was checked in May.  If she chooses to take ibuprofen then she should take it with food.  She could take 600 mg up to 4 times daily, but I would not recommend that she take this for several straight days.  She could go with Tylenol and then just use the ibuprofen as needed for breakthrough pain.  Thank you, DE

## 2021-06-11 NOTE — TELEPHONE ENCOUNTER
Question following Office Visit  When did you see your provider: yesterday  What is your question: The patient has an appointment for orthopedics but is not able to get in until 9/21/2020. Patient and her daughter are asking since there is a time space can the patient have an order for PT to start prior to ortho?  She seen a physical therapy place on [address] before.  Does not recall name or provider. In chart she has been to [facility].   Okay to leave a detailed message: Yes

## 2021-06-11 NOTE — TELEPHONE ENCOUNTER
I am sorry to hear that she is still in so much pain.  She can take Tylenol and apply ice as needed.  She will probably need to have an MRI of the knee as her symptoms could be due to a meniscus tear.  I am not sure they would do an MRI if she went to the walk-in orthopedic clinic.  She may want to contact them to see if that is something they would order for her after being evaluated.  I think it would be fine for her to wait until her appointment on 9/21, but it would be helpful if we can get her pain under better control.  I can send a small prescription for a stronger pain medication to her pharmacy if she would like. Thanks, DE

## 2021-06-11 NOTE — TELEPHONE ENCOUNTER
Patient Returning Call  Reason for call:  Return call  Information relayed to patient:  Patient was informed of the message below. Patient stated she is in agreement to recommendations below. Patient stated to hold off on the medication refill and she will call when she needs refills. No further action needed.  Patient has additional questions:  No  If YES, what are your questions/concerns:  n/a  Okay to leave a detailed message?: No call back needed

## 2021-06-11 NOTE — TELEPHONE ENCOUNTER
"Symptom  Describe your symptoms: Left knee pain, no improvement since being seen in walk-in care. Is wearing a knee immobilizer and utilizing crutches. Swelling around the knee and warm to the touch. Pain started two weeks ago, please see walk in note from 9/9/20.   Any pain: yes - dull ache when immobilized and resting, when trying to bend the knee, pain is \"excruitiating\" - pain at a 8/10 when bending.   New/Ongoing: Ongoing  How long have you been having symptoms: 13  day(s)  Have you been seen for this:  Yes, Appt: 9/9/20  Have your symptoms changed since this visit?:  No  Appointment offered?: Patient wants to talk with provider to determine next steps and to determine if she is ok to wait for [facility] on 9/21 or if she should utilize their walk-in clinic now.   Triage offered?: Yes, declined  Home remedies tried: Knee immobilizer, rest, elevation, ice and ibuprofen  Requested Pharmacy: [facility]  Okay to leave a detailed message? Yes      "

## 2021-06-11 NOTE — TELEPHONE ENCOUNTER
Medication Question or Clarification  Who is calling: Patient   What medication are you calling about (include dose and sig)?: alendronate (FOSAMAX) 70 MG tablet  12 tablet  3  9/27/2019     Sig - Route: Take 1 tablet (70 mg total) by mouth every 7 days. Take in the morning on an empty stomach with a full glass of water 30 minutes before food - Oral    Who prescribed the medication?: Angel Cuellar   What is your question/concern?: Patient states she is on alendronate , she skipped her last week dose . Patient states she is having side affects  Leg pain and muscle pain on left leg and she injured her left knee she had MRI yesterday and seeing orthopedic on Thursday for follow up for MRI .  Requested Pharmacy: [facility]  Okay to leave a detailed message?: No

## 2021-06-11 NOTE — TELEPHONE ENCOUNTER
"Medication Question or Clarification  Who is calling: Patient   What medication are you calling about (include dose and sig)?:   pen needle, diabetic (BD ULTRA-FINE SHERRY PEN NEEDLE) 32 gauge x 5/32\" Ndle  180 each  1  7/11/2020   No    Sig: USE TWICE A DAY WITH INSULIN PEN        Who prescribed the medication?: Marta Hummel, Aaron Boles, DO   What is your question/concern?: Patient is wondering what to do when she runs out of needles - she is currently doing three injections per day between her Victoza and split Lantus doses.   Can she get an increased quantity of peen needles per prescription so she does not run out of needles early?  If so, please send new prescription to [facility] and notify patient.  Requested Pharmacy: [facility]  Okay to leave a detailed message?: Yes        "

## 2021-06-11 NOTE — PROGRESS NOTES
Assessment:      Healthy female exam.    1. Well adult exam     2. Type 2 diabetes mellitus, uncontrolled  Glycosylated Hemoglobin A1c    Microalbumin, Random Urine   3. Other hyperlipidemia  Comprehensive Metabolic Panel    Lipid Cascade   4. Benign Essential Hypertension     5. Type 2 diabetes mellitus  blood glucose test (CONTOUR NEXT STRIPS) strips   6. Overweight     7. Osteopenia       The following high BMI interventions were performed this visit: encouragement to exercise     Plan:      I recommended that we check the above labs and she will be notified of the results of they are available.  If her hemoglobin A1c is high we will make adjustments, most likely to the Lantus.  Her blood pressures currently well controlled.  I recommended that she continue to get regular exercise and adequate calcium and vitamin D.     All questions answered.  Discussed healthy lifestyle modifications.  Follow up as needed.     Subjective:      Nancy Hughes is a 66 y.o. female who presents for an annual exam. The patient is not sexually active. The patient participates in regular exercise: yes. The patient reports that there is not domestic violence in her life.  She denies having concerns regarding hearing, vision, urination, bowel movements, sleep or mood.  She is a history of type 2 diabetes, hypertension and hyperlipidemia.  She reports feeling like she is in good health at this time.  Her hemoglobin A1c was 7.5% with an LDL of 95 on 1/23/17.  She is currently on Lantus 14 units daily, metformin and Victoza.  She has noticed that some of her blood glucose readings have sporadically been in the 200s at times.     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    Healthy Habits:   Regular Exercise: Yes  Sunscreen Use: Yes  Healthy Diet: Yes  Dental Visits Regularly: Yes  Seat Belt: Yes  Sexually active: No  Self Breast Exam Monthly:Yes  Hemoccults: No  Flex Sig: No  Colonoscopy: 10/07/14 recheck in 5  years  Lipid Profile: 2016  Glucose Screen: 2016  Prevention of Osteoporosis: Yes  Last Dexa: 2017  Guns at Home:  N/A      Immunization History   Administered Date(s) Administered     Hep B, historic 2004, 2004, 2004     Influenza M8m4-29, 2009     Influenza, inj, historic 2013, 2015, 2016     Pneumo Conj 13-V (2010&after) 2015     Pneumo Polysac 23-V 2010     Tdap 2011, 2014     ZOSTER 2012     Immunization status: up to date and documented.    No exam data present    Gynecologic History  No LMP recorded. Patient is postmenopausal.  Contraception: none  Last mammogram: 2016. Results were: normal      OB History    Para Term  AB Living   2 2 2      SAB TAB Ectopic Multiple Live Births             # Outcome Date GA Lbr Medhat/2nd Weight Sex Delivery Anes PTL Lv   2 Term            1 Term                   Current Outpatient Prescriptions   Medication Sig Dispense Refill     aspirin 81 mg TbEF Take 1 tablet by mouth daily.       aspirin-acetaminophen-caffeine (EXCEDRIN MIGRAINE) 250-250-65 mg per tablet Take 1 tablet by mouth every 6 (six) hours as needed for pain.       blood glucose test (CONTOUR NEXT STRIPS) strips Test 4 times daily as directed 360 strip 3     blood-glucose meter (CONTOUR NEXT EZ METER) Misc Use as directed 1 each 0     calcium citrate-vitamin D (CALCIUM CITRATE +) 315-200 mg-unit per tablet Take 1 tablet by mouth 2 (two) times a day.       DOCUSATE CALCIUM (STOOL SOFTENER ORAL) Take by mouth.       insulin glargine (LANTUS; BASAGLAR) 100 unit/mL (3 mL) pen Inject 16 Units under the skin every morning. 5 adj dose pen 6     lancets (MICROLET LANCET) Misc Use 4 times daily 150 each 3     liraglutide (VICTOZA) 0.6 mg/0.1 mL (18 mg/3 mL) PnIj injection Inject 0.3 mL (1.8 mg total) under the skin daily. With or without food. 27 mL 0     lisinopril (PRINIVIL,ZESTRIL) 40 MG tablet Take 1 tablet (40  "mg total) by mouth daily. 90 tablet 3     MAGNESIUM CARBONATE ORAL Take by mouth.       metFORMIN (GLUCOPHAGE) 1000 MG tablet Take 1 tablet (1,000 mg total) by mouth 2 (two) times a day with meals. 180 tablet 3     MULTIVITAMIN (MULTIPLE VITAMIN ESSENTIAL ORAL) Take by mouth.       pen needle, diabetic (BD ULTRA-FINE SHERRY PEN NEEDLES) 32 gauge x 5/32\" Ndle USE TWICE A DAY WITH INSULIN  each 1     pravastatin (PRAVACHOL) 10 MG tablet Take 1 tablet (10 mg total) by mouth bedtime. 90 tablet 2     psyllium with dextrose (FIBER) powder Take by mouth 3 (three) times a day.       No current facility-administered medications for this visit.      No past medical history on file.  Past Surgical History:   Procedure Laterality Date     BREAST CYST ASPIRATION       BREAST CYST ASPIRATION Right 1980     Clindamycin  Family History   Problem Relation Age of Onset     Breast cancer Mother 81     Colon cancer Brother 64     Breast cancer Paternal Aunt      Social History     Social History     Marital status:      Spouse name: N/A     Number of children: N/A     Years of education: N/A     Occupational History     Not on file.     Social History Main Topics     Smoking status: Former Smoker     Types: Cigarettes     Smokeless tobacco: Not on file     Alcohol use No     Drug use: Not on file     Sexual activity: Not on file     Other Topics Concern     Not on file     Social History Narrative       Review of Systems  General:  Denies problem  Eyes: Denies problem  Ears/Nose/Throat: Denies problem  Cardiovascular: Denies problem  Respiratory:  Denies problem  Gastrointestinal:  Denies problem, Genitourinary: Denies problem  Musculoskeletal:  Denies problem  Skin: Denies problem  Neurologic: Denies problem  Psychiatric: Denies problem  Endocrine: Denies problem  Heme/Lymphatic: Denies problem   Allergic/Immunologic: Denies problem        Objective:         Vitals:    06/14/17 0931   BP: 126/80   Pulse: 80   Resp: 16 " "  Temp: 98.5  F (36.9  C)   TempSrc: Oral   Weight: 154 lb 8 oz (70.1 kg)   Height: 5' 4.7\" (1.643 m)     Body mass index is 25.95 kg/(m^2).    Physical Exam:  General Appearance: Alert, cooperative, no distress, appears stated age  Head: Normocephalic, without obvious abnormality, atraumatic  Eyes: PERRL, conjunctiva/corneas clear, EOM's intact  Ears: Normal TM's and external ear canals, both ears  Nose: Nares normal, septum midline,mucosa normal, no drainage  Throat: Lips, mucosa, and tongue normal; teeth and gums normal  Neck: Supple, symmetrical, trachea midline, no adenopathy;  thyroid: not enlarged, symmetric, no tenderness/mass/nodules  Back: Symmetric, no curvature, ROM normal, no CVA tenderness  Lungs: Clear to auscultation bilaterally, respirations unlabored  Breasts: No breast masses, tenderness, asymmetry, or nipple discharge.  Heart: Regular rate and rhythm, S1 and S2 normal, no murmur, rub, or gallop,   Abdomen: Soft, non-tender, bowel sounds active all four quadrants,  no masses, no organomegaly  Pelvic:Not examined  Extremities: Extremities normal, atraumatic, no cyanosis or edema  Skin: Skin color, texture, turgor normal, no rashes or lesions  Lymph nodes: Cervical, supraclavicular, and axillary nodes normal  Neurologic: Normal        "

## 2021-06-11 NOTE — TELEPHONE ENCOUNTER
"Nancy is calling and states that last week hurt knee, twisted it wrong, left knee.  Nancy could barely walk.  Knee is swollen, these symptoms are since last Tuesday.  Nancy was using crutches and almost fell and something seemed to pop knee back into place.  Yesterday doing morning walking twisted knee again and re injured it again.  Knee is currently swollen and pain is \"bad\". Nancy is going to converse with  and will make a decision then.  Nancy is nervous about going to [facility] to Covid.    COVID 19 Nurse Triage Plan/Patient Instructions    Please be aware that novel coronavirus (COVID-19) may be circulating in the community. If you develop symptoms such as fever, cough, or SOB or if you have concerns about the presence of another infection including coronavirus (COVID-19), please contact your health care provider or visit [email or web address].     Disposition/Instructions    In-Person Visit with provider recommended. Reference Visit Selection Guide.    Thank you for taking steps to prevent the spread of this virus.  o Limit your contact with others.  o Wear a simple mask to cover your cough.  o Wash your hands well and often.    Resources    [facility]: About COVID-19: [email or web address]    CDC: What to Do If You're Sick: www.cdc.gov/coronavirus/2019-ncov/about/steps-when-sick.html    CDC: Ending Home Isolation: www.cdc.gov/coronavirus/2019-ncov/hcp/disposition-in-home-patients.html     CDC: Caring for Someone: www.cdc.gov/coronavirus/2019-ncov/if-you-are-sick/care-for-someone.html     [facility]: Interim Guidance for Hospital Discharge to Home: www.health.[facility].[place]/diseases/coronavirus/hcp/hospdischarge.pdf    [facility] clinical trials (COVID-19 research studies): [email or web address]/umn-[email or web address]-trials     Below are the COVID-19 hotlines at the [place] Department of Health ([facility]). Interpreters are available.   o For health questions: Call 451-798-3681 or 1-326.688.5347 (7 a.m. to 7 " p.m.)  o For questions about schools and childcare: Call 946-005-4278 or 1-179.536.2797 (7 a.m. to 7 p.m.)         Additional Information    Negative: Sounds like a life-threatening emergency to the triager    Negative: [1] Swollen joint AND [2] fever    Negative: [1] Red area or streak AND [2] fever    Negative: Patient sounds very sick or weak to the triager    [1] SEVERE pain (e.g., excruciating, unable to walk) AND [2] not improved after 2 hours of pain medicine    Protocols used: KNEE PAIN-A-AH

## 2021-06-11 NOTE — PATIENT INSTRUCTIONS - HE
Protected weightbearing status with nonweightbearing crutch ambulation.  Use of knee immobilizer until seen by orthopedics.  Take a aspirin or baby aspirin once daily for DVT prophylaxis while in the knee immobilizer.  You may be out of the knee immobilizer for hygiene and sleep as necessary.  Follow-up with orthopedics for definitive evaluation and treatment.

## 2021-06-11 NOTE — TELEPHONE ENCOUNTER
Patient Returning Call  Reason for call:  Return call  Information relayed to patient:  Patient was informed of the message below.  Patient has additional questions:  Yes  If YES, what are your questions/concerns:  Patient stated she didn't get the message until now. Patient is questioning if she can still come in. Please call.  Okay to leave a detailed message?: Yes  [phone]

## 2021-06-11 NOTE — TELEPHONE ENCOUNTER
Dealing with left knee injury x 1 month.  Has had an x-ray. Has been seen in   Ortho referral [facility] Document has been scanned into [facility]  MRI  Aspirated and steroid injection    Knee:  Swelling  Sl increased warmth  No redness    Generally feels not well.  Afebrile  Nausea  No appetite  Stools softer than normal.    Patient wondering if her general symptoms could be an indication of something more going on than her knee injury/pain.  Would like to see a pcp and request blood work be done to rule out something other than having had a knee injury.    COVID 19 Nurse Triage Plan/Patient Instructions    Please be aware that novel coronavirus (COVID-19) may be circulating in the community. If you develop symptoms such as fever, cough, or SOB or if you have concerns about the presence of another infection including coronavirus (COVID-19), please contact your health care provider or visit [email or web address].     Disposition/Instructions    Home care recommended. Follow home care protocol based instructions.    Thank you for taking steps to prevent the spread of this virus.  o Limit your contact with others.  o Wear a simple mask to cover your cough.  o Wash your hands well and often.    Resources    [facility]: About COVID-19: [email or web address]    CDC: What to Do If You're Sick: www.cdc.gov/coronavirus/2019-ncov/about/steps-when-sick.html    CDC: Ending Home Isolation: www.cdc.gov/coronavirus/2019-ncov/hcp/disposition-in-home-patients.html     CDC: Caring for Someone: www.cdc.gov/coronavirus/2019-ncov/if-you-are-sick/care-for-someone.html     [facility]: Interim Guidance for Hospital Discharge to Home: www.health.[facility].[place]/diseases/coronavirus/hcp/hospdischarge.pdf    [facility] clinical trials (COVID-19 research studies): clinicalaffairs.[facility]/umn-clinical-trials     Below are the COVID-19 hotlines at the [place] Department of Health ([facility]). Interpreters are available.   o For [employer]  questions: Call 910-658-5083 or 1-265.825.7552 (7 a.m. to 7 p.m.)  o For questions about schools and childcare: Call 769-927-2645 or 1-871.989.1529 (7 a.m. to 7 p.m.)     Reason for Disposition    Information only question and nurse able to answer    Additional Information    Negative: Nursing judgment    Negative: Nursing judgment    Negative: Nursing judgment    Negative: Nursing judgment    Protocols used: NO PROTOCOL AVAILABLE - INFORMATION ONLY-A-[place]    Mona CUENCA RN FNA

## 2021-06-11 NOTE — TELEPHONE ENCOUNTER
Patient notified that physical therapy is not recommended before she is evaluated by orthopedics per note from yesterday. Advised she contact her PCP if she would further like to discuss physical therapy and she verbalized understanding. Questions answered.

## 2021-06-11 NOTE — TELEPHONE ENCOUNTER
Please contact this patient and let her know that I can work her into my schedule early this afternoon.  Thank you, DE

## 2021-06-11 NOTE — TELEPHONE ENCOUNTER
Nancy had knee injury three weeks ago, went to Ortho. Had MRI week ago. Went to follow up with Ortho two days ago (Thursday). Did aspiration and steroid shot.     Is going to be doing PT, not scheduled yet.    Calling today as blood sugar is elevated.     Usually has some lows, but after steroid shot has been having 240, 307 today after breakfast.     Current diabetes management is:    Lantus splitting 14 in am and 11 at night. Total of 25 units.   Adjusted as was getting some lows, usually during night, occasionally am.     Metformin taking 1000 mg twice daily.     Victoza takes 1.8 around noon each day.     Called/paged on call Dr. Stone to discuss. Unable to reach, called/paged Dr. Gresham.      Dr. Gresham recommended no changes now, continue to monitor closely and let us know if continue to go up or if do not start to go back down in next day or so. Likely due to steroids and should go down, if increased pain, any fever, or any other symptoms or feeling unwell, be seen to r/o other reason for increased blood sugars besides steroid shot. She feels comfortable with this plan and verbalized understanding.           Additional Information    Negative: Unconscious or difficult to awaken    Negative: Acting confused (e.g., disoriented, slurred speech)    Negative: Very weak (e.g., can't stand)    Negative: Sounds like a life-threatening emergency to the triager    Negative: [1] Vomiting AND [2] signs of dehydration (e.g., very dry mouth, lightheaded, dark urine)    Negative: [1] Blood glucose > 240 mg/dL (13.3 mmol/L) AND [2] rapid breathing    Protocols used: DIABETES - HIGH BLOOD SUGAR-A-

## 2021-06-11 NOTE — PROGRESS NOTES
"Subjective:      Patient ID: Nancy Hughes is a 70 y.o. female.    Chief Complaint:    HPI     Nancy Hughes is a 70 y.o. female who presents today complaining of one week history of left knee pain and swelling.  Patient recounts past medical history for getting up off of the toilet and sustaining a twisting injury to the left knee.  She immediately had pain and swelling and inability to bear weight on the left knee.  This happened 1 week ago and she has been trying to treat this at home with rest and elevation and decreased activity in the left leg.  Patient has noticed that she has some locking catching swelling and inability to fully flex the knee secondary to pain.  She feels that yesterday she \"reinjured it\" and it has gotten worse.  She continues to have swelling in the left knee and is been using a neoprene brace for compression and comfort.  At this time she denies any history of hip or ankle pain on the ipsilateral side no contralateral right lower extremity back or pelvic pain to report.  She has not had prior history of surgery or injury to the left knee.      Past Medical History:   Diagnosis Date     Diabetes mellitus, type II (H)      Hypertension        Past Surgical History:   Procedure Laterality Date     APPENDECTOMY       BREAST CYST ASPIRATION       BREAST CYST ASPIRATION Right 1980     TONSILLECTOMY         Family History   Problem Relation Age of Onset     Breast cancer Mother 81     Colon cancer Brother 64     Breast cancer Paternal Aunt 65       Social History     Tobacco Use     Smoking status: Former Smoker     Types: Cigarettes     Smokeless tobacco: Never Used   Substance Use Topics     Alcohol use: No     Drug use: No       Review of Systems  As above in HPI, otherwise balance of Review of Systems are negative.    Objective:     /90 (Patient Site: Right Arm, Patient Position: Sitting, Cuff Size: Adult Regular)   Pulse 91   Temp 99.1  F (37.3  C) (Oral)   Resp 16   SpO2 98% "     Physical Exam  General: Patient is resting comfortably no acute distress is afebrile  HEENT: Head is normocephalic atraumatic   Skin: Without rash non-diaphoretic  Musculoskeletal: Focused examination of the left knee shows that there is effusion of the suprapatellar area of the knee and of the knee joint.  Patella is non-ballotable.  Quadriceps mechanism is very weak to testing to resistance.  Patellar tendon appears to be stronger and intact but causes pain to resisted extension at the quadriceps mechanism.  She has no pain at the joint line.  Attempted Chen's testing at the left knee is poorly tolerated due to the overpressure of flexion of the left knee.  Medial lateral collateral are intact valgus and varus stressing respectively.     Imaging    Xr Knee Left Plus Sunrise Vw    Result Date: 9/9/2020  EXAM DATE:         09/09/2020 EXAM: X-RAY KNEE, LEFT, 3 VIEWS LOCATION: [facility] DATE/TIME: 9/9/2020 12:15 PM INDICATION: Left knee quadriceps tendon pain and weakness and left patella pain. COMPARISON: None. IMPRESSION: Small left knee joint effusion. No evidence for fracture. Degenerative narrowing of the medial aspect of the patellofemoral compartment.       I personally reviewed x-rays and noted a knee effusion but no fractures or dislocation.    Assessment:     Procedures    The primary encounter diagnosis was Acute pain of left knee. A diagnosis of Effusion of bursa of left knee was also pertinent to this visit.    Plan:     1. Acute pain of left knee  XR Knee Left Plus Sunrise VW    Knee DME: Knee Immoblizer    Ambulatory referral to Orthopedics   2. Effusion of bursa of left knee  Knee DME: Knee Immoblizer    Ambulatory referral to Orthopedics       MDM:  Advised patient that I am concerned that she has a strain in her quadriceps extension mechanism.  She has pain at the proximal portion of the quadriceps tendon.  She also has a large joint effusion.  Knee exam was difficult to  perform on the patient due to the swelling and guarding.  Patient is placed in a knee immobilizer, she will use an aspirin a day and ibuprofen for analgesia and anti-inflammatory effect and DVT prophylaxis.  Risks and benefits of the aspirin and ibuprofen were gone over.  Close follow-up with orthopedics for definitive evaluation and treatment.  In the interim, she will use a knee immobilizer and crutch ambulation for protected weightbearing status.  She will be out of the knee immobilizer as needed for hygiene and sleep.  Use knee immobilizer when up and active.  Questions were answered to patient satisfaction before discharge.    Patient Instructions   Protected weightbearing status with nonweightbearing crutch ambulation.  Use of knee immobilizer until seen by orthopedics.  Take a aspirin or baby aspirin once daily for DVT prophylaxis while in the knee immobilizer.  You may be out of the knee immobilizer for hygiene and sleep as necessary.  Follow-up with orthopedics for definitive evaluation and treatment.

## 2021-06-11 NOTE — TELEPHONE ENCOUNTER
Left message to call back for: Nancy    Information to relay to patient:    Relayed Dr. Marta Hummel's message below.  He can fit her into his schedule (double-book) right away this afternoon (12:30ish) if she is available.    Mary Jane Benavidez, CMA

## 2021-06-11 NOTE — TELEPHONE ENCOUNTER
Who is calling:    Patient    Reason for Call:    Ortho recommended that the patient takes IBU, however she is a diabetic and is requesting PCP to give dosing instructions.    Date of last appointment with primary care: 05/12/2020    Okay to leave a detailed message: Yes    Please call to discuss the dosing and how long to use.

## 2021-06-11 NOTE — TELEPHONE ENCOUNTER
The knee injury would be unlikely to be related to use of alendronate.  I am not sure what other potential adverse effects she was concerned about.  If desired, she could give a month off the medication and see if she feels better without it.  Did she want the refill sent in at this time?

## 2021-06-12 NOTE — PROGRESS NOTES
Assessment/Plan:        1. Malaise/ lower abdominal pain   Malaise, nausea and decreased appetite lower abdominal discomfort  Status post Appy    Plan:   - Comprehensive Metabolic Panel  - HM1(CBC and Differential)  - Urinalysis-UC if Indicated  - Erythrocyte Sedimentation Rate  - C-Reactive Protein(CRP)  - Lactate Dehydrogenase (LDH)    We will follow-up to the results of the study and manage accordingly.    Consider abdominal / pelvic CT.     2. Type 2 diabetes mellitus with diabetic neuropathy, with long-term current use of insulin (H)    - Glycosylated Hemoglobin A1c      40  minutes spent on this visit with more than 50% time spent on reviewing medical record, education,  entering orders, reviewing and commenting on the test results, and preparing documentation for the visit           Subjective:    Patient ID:   Nancy Hughes is a 70 y.o. female presenting with left knee pain following an injury on 9/2/2020, resulting in a strain, and being managed by orthopedics, with Physical Therapy recommendation starting next week.  She is also been treated with the cortisone shot and fluid extraction from the knee. Her knee x-ray has shown a small joint effusion with degenerative narrowing of the medial aspect of the lateral femoral compartment with no fracture.    She also notes to feeling Malaise, decreased appetite and intermittently nauseated for the past 2 weeks;  wondering if having an underlying infection.  She is particularly nauseous after eating, with a better feeling on an empty stomach.  She has no abdominal pain, acknowledging to having some soft stools with some discomfort in the lower half of the abdomen.    Of note she has a history of appendectomy.      Review of Systems  Allergy: reviewed  General : negative  A complete 5 point review of systems was obtained and is negative other than what is stated in the HPI.       The following patient's history were reviewed and updated as appropriate:   She  has a  past medical history of Diabetes mellitus, type II (H) and Hypertension.  She  has a past surgical history that includes Breast cyst aspiration; Breast cyst aspiration (Right, 1980); Tonsillectomy; and Appendectomy..      Outpatient Encounter Medications as of 10/2/2020   Medication Sig Dispense Refill     amLODIPine (NORVASC) 5 MG tablet Take 1 tablet (5 mg total) by mouth daily. 90 tablet 3     aspirin 81 mg TbEF Take 1 tablet by mouth daily.       aspirin-acetaminophen-caffeine (EXCEDRIN MIGRAINE) 250-250-65 mg per tablet Take 1 tablet by mouth every 6 (six) hours as needed for pain.       blood glucose test strips Use 1 each As Directed 3 (three) times a day. Dispense brand per patient's insurance at pharmacy discretion. 300 strip 3     calcium citrate-vitamin D (CALCIUM CITRATE +) 315-200 mg-unit per tablet Take 1 tablet by mouth 2 (two) times a day.       DOCUSATE CALCIUM (STOOL SOFTENER ORAL) Take 100 mg by mouth daily.        gabapentin (NEURONTIN) 300 MG capsule Take 1 capsule (300 mg total) by mouth at bedtime. 90 capsule 3     insulin glargine (LANTUS SOLOSTAR U-100 INSULIN) 100 unit/mL (3 mL) pen Inject 24 Units under the skin daily. Do not mix Lantus with any other insulin 10 adj dose pen 4     lancets 30 gauge Misc Use 1 each As Directed 3 (three) times a day. Use one lancet with your Verio meter to check blood sugar as directed 300 each 3     liraglutide (VICTOZA) 0.6 mg/0.1 mL (18 mg/3 mL) PnIj injection Inject 1.8 mg under the skin daily for 90 doses. With or without food. 9 Syringe 4     magnesium oxide 250 mg Tab Take 250 mg by mouth daily.       metFORMIN (GLUCOPHAGE) 1000 MG tablet Take 1 tablet (1,000 mg total) by mouth 2 (two) times a day with meals. 180 tablet 3     metoprolol succinate (TOPROL XL) 50 MG 24 hr tablet Take 1 tablet (50 mg total) by mouth daily. 90 tablet 3     MULTIVITAMIN (MULTIPLE VITAMIN ESSENTIAL ORAL) Take 1 tablet by mouth daily.        pen needle, diabetic (BD  "ULTRA-FINE SHERRY PEN NEEDLE) 32 gauge x 5/32\" Ndle Use twice daily with insulin 300 each 3     pen needle, diabetic (BD ULTRA-FINE SHERRY PEN NEEDLE) 32 gauge x 5/32\" Ndle USE THREE TIMES A DAY WITH INSULIN  each 1     alendronate (FOSAMAX) 70 MG tablet Take 1 tablet (70 mg total) by mouth every 7 days. Take in the morning on an empty stomach with a full glass of water 30 minutes before food 12 tablet 3     No facility-administered encounter medications on file as of 10/2/2020.          Objective:   /80 (Patient Site: Right Arm, Patient Position: Sitting, Cuff Size: Adult Regular)   Pulse 71   Temp 96.8  F (36  C) (Tympanic)   Resp 20   Ht 5' 4.75\" (1.645 m)   Wt 165 lb 3.2 oz (74.9 kg)   SpO2 97%   BMI 27.70 kg/m        Physical Exam  General Appearance:    Alert,  no acute distress, well hydrated    Eyes:    PERRL, conjunctiva/corneas clear, non icterus    Throat:   Lips, mucosa, and tongue normal;  gums normal   Neck:   Supple, symmetrical, trachea midline, no adenopathy;        thyroid:  No enlargement/tenderness/nodules;   Lungs:     Clear to auscultation bilaterally, respirations unlabored   Heart:    Regular rate and rhythm, S1 and S2 normal, no murmur, rub   or gallop   Abdomen:      Soft, nondistended, generalized palpable tenderness to the lower half of the abdomen, normal bowel sounds, no rebound or guarding, no masses, no organomegaly   Extremities:   Extremities normal, atraumatic, no edema   Skin:  No bruising, rashes or lesions       "

## 2021-06-12 NOTE — TELEPHONE ENCOUNTER
Called and spoke with Nancy, she is improving and does not feel like she needs a CT right now, but advised DE placed an order for her and she can call to schedule if she feels the need. Patient expressed understanding.    Arabella Velasquez, [facility]

## 2021-06-12 NOTE — TELEPHONE ENCOUNTER
Question following Office Visit  When did you see your provider: 10/2/2020  What is your question: States her nausea has not gotten better. States she would like to get the CT scan if Dr Lozano thinks this would be a good idea.  Please advise.  Okay to leave a detailed message: Yes

## 2021-06-12 NOTE — TELEPHONE ENCOUNTER
Refill request for amlodipine refused - requested too soon.  Last filled 6/24/2019 #90 tablets with 3 refills.      Refill Approved x 3    Rx renewed per Medication Renewal Policy. Medication was last renewed on 11/11/2019.    Kiki Farfan, [facility] Connection Triage/Med Refill 10/18/2020     Requested Prescriptions   Pending Prescriptions Disp Refills     gabapentin (NEURONTIN) 300 MG capsule 90 capsule 3     Sig: Take 1 capsule (300 mg total) by mouth at bedtime.       Gabapentin/Levetiracetam/Tiagabine Refill Protocol  Passed - 10/16/2020 10:50 AM        Passed - PCP or prescribing provider visit in past 12 months or next 3 months     Last office visit with prescriber/PCP: 12/19/2019 Aaron Toledo DO OR same dept: 12/19/2019 Aaron Toledo DO OR same specialty: 10/2/2020 Anupam Rodney MD  Last physical: 11/11/2019 Last MTM visit: Visit date not found   Next visit within 3 mo: Visit date not found  Next physical within 3 mo: Visit date not found  Prescriber OR PCP: Aaron Hummel DO  Last diagnosis associated with med order: 1. Type 2 diabetes mellitus with diabetic neuropathy, with long-term current use of insulin (H)  - gabapentin (NEURONTIN) 300 MG capsule; Take 1 capsule (300 mg total) by mouth at bedtime.  Dispense: 90 capsule; Refill: 3  - metFORMIN (GLUCOPHAGE) 1000 MG tablet; Take 1 tablet (1,000 mg total) by mouth 2 (two) times a day with meals.  Dispense: 180 tablet; Refill: 3    2. Benign Essential Hypertension  - metoprolol succinate (TOPROL XL) 50 MG 24 hr tablet; Take 1 tablet (50 mg total) by mouth daily.  Dispense: 90 tablet; Refill: 3  - amLODIPine (NORVASC) 5 MG tablet; Take 1 tablet (5 mg total) by mouth daily.  Dispense: 90 tablet; Refill: 3    If protocol passes may refill for 12 months if within 3 months of last provider visit (or a total of 15 months).                metFORMIN (GLUCOPHAGE) 1000 MG tablet 180 tablet 3     Sig: Take 1 tablet  (1,000 mg total) by mouth 2 (two) times a day with meals.       Metformin Refill Protocol Passed - 10/16/2020 10:50 AM        Passed - Blood pressure in last 12 months     BP Readings from Last 1 Encounters:   10/02/20 138/80             Passed - LFT or AST or ALT in last 12 months     Albumin   Date Value Ref Range Status   10/02/2020 3.9 3.5 - 5.0 g/dL Final     Bilirubin, Total   Date Value Ref Range Status   10/02/2020 0.4 0.0 - 1.0 mg/dL Final     Alkaline Phosphatase   Date Value Ref Range Status   10/02/2020 76 45 - 120 U/L Final     AST   Date Value Ref Range Status   10/02/2020 18 0 - 40 U/L Final     ALT   Date Value Ref Range Status   10/02/2020 15 0 - 45 U/L Final     Protein, Total   Date Value Ref Range Status   10/02/2020 6.2 6.0 - 8.0 g/dL Final                Passed - GFR or Serum Creatinine in last 6 months     GFR MDRD Non Af Amer   Date Value Ref Range Status   10/02/2020 >60 >60 mL/min/1.73m2 Final     GFR MDRD Af Amer   Date Value Ref Range Status   10/02/2020 >60 >60 mL/min/1.73m2 Final             Passed - Visit with PCP or prescribing provider visit in last 6 months or next 3 months     Last office visit with prescriber/PCP: Visit date not found OR same dept: 12/19/2019 Aaron Toledo DO OR same specialty: 10/2/2020 Anupam Rodney MD Last physical: Visit date not found Last MTM visit: Visit date not found         Next appt within 3 mo: Visit date not found  Next physical within 3 mo: Visit date not found  Prescriber OR PCP: Aaron Hummel DO  Last diagnosis associated with med order: 1. Type 2 diabetes mellitus with diabetic neuropathy, with long-term current use of insulin (H)  - gabapentin (NEURONTIN) 300 MG capsule; Take 1 capsule (300 mg total) by mouth at bedtime.  Dispense: 90 capsule; Refill: 3  - metFORMIN (GLUCOPHAGE) 1000 MG tablet; Take 1 tablet (1,000 mg total) by mouth 2 (two) times a day with meals.  Dispense: 180 tablet; Refill: 3    2.  Benign Essential Hypertension  - metoprolol succinate (TOPROL XL) 50 MG 24 hr tablet; Take 1 tablet (50 mg total) by mouth daily.  Dispense: 90 tablet; Refill: 3  - amLODIPine (NORVASC) 5 MG tablet; Take 1 tablet (5 mg total) by mouth daily.  Dispense: 90 tablet; Refill: 3     If protocol passes may refill for 12 months if within 3 months of last provider visit (or a total of 15 months).           Passed - A1C in last 6 months     Hemoglobin A1c   Date Value Ref Range Status   10/02/2020 8.3 (H) <=5.6 % Final     Comment:     Normal <5.7% Prediabete 5.7-6.4% Diabletes 6.5% or higher - adopted from ADA consensus guidelines               Passed - Microalbumin in last year      Microalbumin, Random Urine   Date Value Ref Range Status   05/22/2020 4.95 (H) 0.00 - 1.99 mg/dL Final                     metoprolol succinate (TOPROL XL) 50 MG 24 hr tablet 90 tablet 3     Sig: Take 1 tablet (50 mg total) by mouth daily.       Beta-Blockers Refill Protocol Passed - 10/16/2020 10:50 AM        Passed - PCP or prescribing provider visit in past 12 months or next 3 months     Last office visit with prescriber/PCP: 12/19/2019 Aaron Toledo DO OR same dept: 12/19/2019 Aaron Toledo DO OR same specialty: 10/2/2020 Anupam Rodney MD  Last physical: 11/11/2019 Last MTM visit: Visit date not found   Next visit within 3 mo: Visit date not found  Next physical within 3 mo: Visit date not found  Prescriber OR PCP: Aaron Hummel DO  Last diagnosis associated with med order: 1. Type 2 diabetes mellitus with diabetic neuropathy, with long-term current use of insulin (H)  - gabapentin (NEURONTIN) 300 MG capsule; Take 1 capsule (300 mg total) by mouth at bedtime.  Dispense: 90 capsule; Refill: 3  - metFORMIN (GLUCOPHAGE) 1000 MG tablet; Take 1 tablet (1,000 mg total) by mouth 2 (two) times a day with meals.  Dispense: 180 tablet; Refill: 3    2. Benign Essential Hypertension  - metoprolol  succinate (TOPROL XL) 50 MG 24 hr tablet; Take 1 tablet (50 mg total) by mouth daily.  Dispense: 90 tablet; Refill: 3  - amLODIPine (NORVASC) 5 MG tablet; Take 1 tablet (5 mg total) by mouth daily.  Dispense: 90 tablet; Refill: 3    If protocol passes may refill for 12 months if within 3 months of last provider visit (or a total of 15 months).             Passed - Blood pressure filed in past 12 months     BP Readings from Last 1 Encounters:   10/02/20 138/80                amLODIPine (NORVASC) 5 MG tablet 90 tablet 3     Sig: Take 1 tablet (5 mg total) by mouth daily.       Calcium-Channel Blockers Protocol Passed - 10/16/2020 10:50 AM        Passed - PCP or prescribing provider visit in past 12 months or next 3 months     Last office visit with prescriber/PCP: 12/19/2019 Aaron Toledo DO OR same dept: 12/19/2019 Aaron Toledo DO OR same specialty: 10/2/2020 Anupam Rodney MD  Last physical: 11/11/2019 Last MTM visit: Visit date not found   Next visit within 3 mo: Visit date not found  Next physical within 3 mo: Visit date not found  Prescriber OR PCP: Aaron Hummel DO  Last diagnosis associated with med order: 1. Type 2 diabetes mellitus with diabetic neuropathy, with long-term current use of insulin (H)  - gabapentin (NEURONTIN) 300 MG capsule; Take 1 capsule (300 mg total) by mouth at bedtime.  Dispense: 90 capsule; Refill: 3  - metFORMIN (GLUCOPHAGE) 1000 MG tablet; Take 1 tablet (1,000 mg total) by mouth 2 (two) times a day with meals.  Dispense: 180 tablet; Refill: 3    2. Benign Essential Hypertension  - metoprolol succinate (TOPROL XL) 50 MG 24 hr tablet; Take 1 tablet (50 mg total) by mouth daily.  Dispense: 90 tablet; Refill: 3  - amLODIPine (NORVASC) 5 MG tablet; Take 1 tablet (5 mg total) by mouth daily.  Dispense: 90 tablet; Refill: 3    If protocol passes may refill for 12 months if within 3 months of last provider visit (or a total of 15 months).              Passed - Blood pressure filed in past 12 months     BP Readings from Last 1 Encounters:   10/02/20 138/80

## 2021-06-12 NOTE — TELEPHONE ENCOUNTER
Please let this patient know that I ordered the CT scan of her abdomen to further evaluate the nausea and lower abdominal discomfort symptoms.  Someone should be contacting her soon to help schedule this.

## 2021-06-13 NOTE — PROGRESS NOTES
Assessment / Impression     1. Type 2 diabetes mellitus, uncontrolled  Glycosylated Hemoglobin A1c   2. Benign Essential Hypertension     3. Other hyperlipidemia     4. Leg cramps  Magnesium    Vitamin B12    HM2(CBC w/o Differential)   5. Ingrown left big toenail           Plan:     Her hemoglobin A1c came down to 8.2%.  I explained that this is still above goal.  I recommend that she increase the Victoza to 1.8 mg daily.  She is planning to do this gradually and go up to 1.5 mg for a few days to make sure that she is not having hypoglycemic episodes before going up to the full 1.8 mg dose.  She is still running high this she will need to increase the Lantus.  I explained that she may do better on mealtime insulin coverage.  Her A1c was at goal when she was on this treatment over the past year.  She does not want to go back to this medication since she did not tolerate it well and she reports gaining weight well on it.  We will see how she does with the higher dose of Victoza and possible increase in the Lantus over the next few weeks.  If she is still running high she will let me know and we can make adjustments as needed.  I recommended that we check a magnesium, vitamin B12 and hemogram to further evaluate her leg cramping symptoms.  I recommended she keep a journal when her symptoms occur to help identify triggers.  I encouraged her to stay well-hydrated since drinking water seems to relieve symptoms.  She had a normal CMP just a few months ago.  I recommended she soak the left great toe in warm soapy water twice daily to see if this helps the pain redness which appears to be due to an ingrown toenail.  If her symptoms do not improve she will need to see podiatry.    Subjective:      HPI: Nancy Hughes is a 67 y.o. female who presents to the clinic for follow-up.  She has a medical history significant for type 2 diabetes, hyperlipidemia and hypertension.  She is currently on Lantus 17 units daily, metformin 1000  "mg twice daily and Victoza 1.2 mg daily.  Her hemoglobin A1c, , PT and urine microalbumin normal.  On 6/14/17 was 8.6% she reports that her morning glucose readings are typically in the 70s-130s.  Her readings in the evenings however are often in the 200s.  She has had a few hypoglycemic episodes as well.  Last October and January her hemoglobin A1c was 7.4% and 7.5% respectively.  She was on low doses of mealtime insulin coverage with NovoLog.  She reports gaining weight and not tolerating the NovoLog well.  She does not want to restart it.  She tries to stay physically active and eat healthy diet.    She describes having leg cramping symptoms in her thighs over the past 3-4 years.  This is episodic.  She is unaware of any specific triggers.  However, drinking water seems to help.  Her CMP was normal this past June.  I recommended she keep a journal    She is also concerned today about soreness in the left great toe.  She trimmed her nails last night and she describes having quite a bit of pain in the left great toe and the lateral nailbed meets the nail.  She has not had discharge but the area appears a little red and sore.        Review of Systems  All other systems reviewed and are negative.     History   Smoking Status     Former Smoker     Types: Cigarettes   Smokeless Tobacco     Not on file       Family History   Problem Relation Age of Onset     Breast cancer Mother 81     Colon cancer Brother 64     Breast cancer Paternal Aunt        Objective:     /74 (Patient Site: Left Arm, Patient Position: Sitting, Cuff Size: Adult Regular)  Pulse 84  Resp 16  Ht 5' 4.7\" (1.643 m)  Wt 157 lb 1.6 oz (71.3 kg)  BMI 26.39 kg/m2  Physical Examination: General appearance - alert, well appearing, and in no distress  Eyes: pupils equal and reactive, extraocular eye movements intact  Mouth: mucous membranes moist, pharynx normal without lesions  Neck: supple, no significant adenopathy  Lungs: clear to " auscultation, no wheezes, rales or rhonchi, symmetric air entry  Heart: normal rate, regular rhythm, normal S1, S2, no murmurs, rubs, clicks or gallops  Abdomen: soft, nontender, nondistended, no masses or organomegaly  Neurological: alert, oriented, normal speech, no focal findings or movement disorder noted.    Extremities: No edema, no clubbing or cyanosis.  There is erythema along the lateral edge of the left great toe with a nail meets the nail bed.  No drainage and the nail appears to be ingrown in this region.  Muscle strength is 5 out of 5 bilateral lower extremities.  Psychiatric: Normal affect. Does not appear anxious or depressed.    Recent Results (from the past 168 hour(s))   Glycosylated Hemoglobin A1c   Result Value Ref Range    Hemoglobin A1c 8.2 (H) 3.5 - 6.0 %   HM2(CBC w/o Differential)   Result Value Ref Range    WBC 6.8 4.0 - 11.0 thou/uL    RBC 4.27 3.80 - 5.40 mill/uL    Hemoglobin 12.8 12.0 - 16.0 g/dL    Hematocrit 38.8 35.0 - 47.0 %    MCV 91 80 - 100 fL    MCH 30.1 27.0 - 34.0 pg    MCHC 33.1 32.0 - 36.0 g/dL    RDW 13.1 11.0 - 14.5 %    Platelets 250 140 - 440 thou/uL    MPV 8.8 7.0 - 10.0 fL       Current Outpatient Prescriptions   Medication Sig Note     aspirin 81 mg TbEF Take 1 tablet by mouth daily.      aspirin-acetaminophen-caffeine (EXCEDRIN MIGRAINE) 250-250-65 mg per tablet Take 1 tablet by mouth every 6 (six) hours as needed for pain.      blood glucose test (CONTOUR NEXT STRIPS) strips Test 4 times daily as directed      blood-glucose meter (CONTOUR NEXT EZ METER) Misc Use as directed      calcium citrate-vitamin D (CALCIUM CITRATE +) 315-200 mg-unit per tablet Take 1 tablet by mouth 2 (two) times a day.      DOCUSATE CALCIUM (STOOL SOFTENER ORAL) Take by mouth.      insulin glargine (LANTUS; BASAGLAR) 100 unit/mL (3 mL) pen Inject 17 Units under the skin every morning.      lancets (MICROLET LANCET) Misc Use 4 times daily      liraglutide (VICTOZA) 0.6 mg/0.1 mL (18 mg/3 mL)  "PnIj injection Inject 0.3 mL (1.8 mg total) under the skin daily. With or without food.      lisinopril (PRINIVIL,ZESTRIL) 40 MG tablet Take 1 tablet (40 mg total) by mouth daily.      MAGNESIUM CARBONATE ORAL Take by mouth.      metFORMIN (GLUCOPHAGE) 1000 MG tablet TAKE 1 TABLET TWICE A DAY WITH MEALS      MULTIVITAMIN (MULTIPLE VITAMIN ESSENTIAL ORAL) Take by mouth.      pen needle, diabetic (BD ULTRA-FINE SHERRY PEN NEEDLES) 32 gauge x 5/32\" Ndle USE TWICE A DAY WITH INSULIN PEN      pravastatin (PRAVACHOL) 10 MG tablet Take 1 tablet (10 mg total) by mouth bedtime.      psyllium with dextrose (FIBER) powder Take by mouth 3 (three) times a day. 6/28/2016: Pt. Takes 4 tsp QD.     FLUZONE HIGH-DOSE 2017-18, PF, 180 mcg/0.5 mL Syrg injection TO BE ADMINISTERED BY PHARMACIST FOR IMMUNIZATION 9/19/2017: Received from: External Pharmacy       "

## 2021-06-14 NOTE — PROGRESS NOTES
Assessment / Impression     1. Acute non-recurrent sinusitis, unspecified location     2. Bronchitis           Plan:     Patient likely has sinusitis and bronchitis, question whether this is viral vs. Bacterial.  Given length of time of symptoms, could presume that this is bacterial and plan to treat with antibiotics.  Also discussed the patient that bronchitis is likely viral, and we discussed that we could get a chest x-ray, however given were already going to treat sinus infection, would not  at this point.  Therefore will treat with Z-Dangelo and see if this improves her symptoms, including her elevated fasting glucose levels.  Follow-up in 1-2 weeks if symptoms persist sooner if they worsen or she develops fever.    Subjective:      HPI: Nancy Hughes is a 67 y.o. female, new to me, who presents for possible sinus infection or bronchitis.  Symptoms started over 2 weeks ago with cold symptoms, and she is noted a tightness and dry cough, especially worse at nighttime.  She has been taking some herbal tea that is supposed to help with cough which she has noted some improvement.  She also had an old prescription of guaifenesin with codeine, which has helped her cough at night.  She also noted pressure behind her left eye and left forehead.  No rhinorrhea, though feels slightly congested.  Denies any jaw pain.  She has not had any fevers.  Symptoms initially were worse, now have gotten slightly better, though not improving like she is expecting.  She is most concerned because she is a diabetic, and her fasting sugars have been in the 200s since becoming ill.  She traveled to [place] in early November, though did not become ill until much later.  Denies any tobacco exposure.  Her 24-year-old grandsons and daughter have also recently been ill and been on antibiotics.      Medical History:     Patient Active Problem List   Diagnosis     Hyperlipemia     Benign Essential Hypertension     Osteopenia      Type 2 diabetes mellitus, uncontrolled     Disorder of bone and cartilage, unspecified     Overweight     Diabetic retinopathy       No past medical history on file.    Past Surgical History:   Procedure Laterality Date     BREAST CYST ASPIRATION       BREAST CYST ASPIRATION Right 1980       Current Medications:     Current Outpatient Prescriptions   Medication Sig Note     aspirin 81 mg TbEF Take 1 tablet by mouth daily.      aspirin-acetaminophen-caffeine (EXCEDRIN MIGRAINE) 250-250-65 mg per tablet Take 1 tablet by mouth every 6 (six) hours as needed for pain.      blood glucose meter (ONETOUCH ULTRA2) Dispense glucometer brand per patient's insurance at pharmacy discretion. Test BGs 3 times daily.      blood glucose test (CONTOUR NEXT STRIPS) strips Test 4 times daily as directed      blood glucose test strips Use 1 test strip with your Verio meter to check blood sugar 3 times daily      blood-glucose meter (CONTOUR NEXT EZ METER) Misc Use as directed      calcium citrate-vitamin D (CALCIUM CITRATE +) 315-200 mg-unit per tablet Take 1 tablet by mouth 2 (two) times a day.      DOCUSATE CALCIUM (STOOL SOFTENER ORAL) Take by mouth.      FLUZONE HIGH-DOSE 2017-18, PF, 180 mcg/0.5 mL Syrg injection TO BE ADMINISTERED BY PHARMACIST FOR IMMUNIZATION 9/19/2017: Received from: External Pharmacy     insulin glargine (LANTUS; BASAGLAR) 100 unit/mL (3 mL) pen Inject 17 Units under the skin every morning.      lancets (MICROLET LANCET) Misc Use 4 times daily      lancets 30 gauge Misc Use 1 each As Directed 3 (three) times a day. Use one lancet with your Verio meter to check blood sugar as directed      liraglutide (VICTOZA) 0.6 mg/0.1 mL (18 mg/3 mL) PnIj injection Inject 0.3 mL (1.8 mg total) under the skin daily. With or without food.      liraglutide (VICTOZA) 0.6 mg/0.1 mL (18 mg/3 mL) PnIj injection Inject 0.3 mL (1.8 mg total) under the skin daily. With or without food.      lisinopril (PRINIVIL,ZESTRIL) 40 MG tablet  "Take 1 tablet (40 mg total) by mouth daily.      MAGNESIUM CARBONATE ORAL Take by mouth.      metFORMIN (GLUCOPHAGE) 1000 MG tablet TAKE 1 TABLET TWICE A DAY WITH MEALS      MULTIVITAMIN (MULTIPLE VITAMIN ESSENTIAL ORAL) Take by mouth.      pen needle, diabetic (BD ULTRA-FINE SHERRY PEN NEEDLES) 32 gauge x 5/32\" Ndle USE TWICE A DAY WITH INSULIN PEN      pravastatin (PRAVACHOL) 10 MG tablet TAKE 1 TABLET AT BEDTIME      psyllium with dextrose (FIBER) powder Take by mouth 3 (three) times a day. 6/28/2016: Pt. Takes 4 tsp QD.     azithromycin (ZITHROMAX Z-SOBEIDA) 250 MG tablet Take 2 tablets (500 mg) on  Day 1,  followed by 1 tablet (250 mg) once daily on Days 2 through 5.        Family History:     Family History   Problem Relation Age of Onset     Breast cancer Mother 81     Colon cancer Brother 64     Breast cancer Paternal Aunt        Review of Systems  All other systems reviewed and are negative.         Social History:     History   Smoking Status     Former Smoker     Types: Cigarettes   Smokeless Tobacco     Never Used     Social History     Social History Narrative   Works part-time as an [employer] teacher, helps care for her twin 4-year-old grandsons.      Objective:     /88  Pulse 81  Temp 98.5  F (36.9  C)  Ht 5' 4.5\" (1.638 m)  Wt 155 lb (70.3 kg)  SpO2 100%  BMI 26.19 kg/m2  Physical Examination: General appearance - alert, well appearing, and in no distress  Head: mild tenderness to palpation over left frontal sinus.  Eyes: pupils equal and reactive, extraocular eye movements intact  Ears: normal external ears, clear canals,  TMs appear normal, with no redness or bulging noted.  Nose: Moderate hypertrophy with erythema of naris bilaterally.  Mouth: mucous membranes moist, pharynx normal without lesions  Neck: supple, no significant adenopathy or thyromegaly  Lungs: clear to auscultation, no wheezes, rales or rhonchi, symmetric air entry  Heart: normal rate, regular rhythm, normal S1, S2, no " murmurs.  Extremities: No edema, no clubbing or cyanosis  Psychiatric: Normal affect. Does not appear anxious or depressed.    No results found for this or any previous visit (from the past 168 hour(s)).      Lorraine Ruiz MD  12/19/2017  2:27 PM

## 2021-06-14 NOTE — TELEPHONE ENCOUNTER
Reason for Call:  Medication or medication refill: refill    Do you use a [facility]?  Name of the pharmacy and phone number for the current request: [facility] home delivery (on file)    Name of the medication requested: amlodipine 5mg    Other request:     Can we leave a detailed message on this number? Yes    Phone number patient can be reached at: Home number on file [phone] (home)    Best Time: anytime    Call taken on 12/28/2020 at 9:08 AM by Pura Mariano

## 2021-06-14 NOTE — PROGRESS NOTES
"Nancy Hughes is a 70 y.o. female who is being evaluated via a billable video visit.      How would you like to obtain your AVS? MyChart.  If dropped from the video visit, the video invitation should be resent by: Text to cell phone: [phone]   Will anyone else be joining your video visit? No      Video Start Time: 9:46 AM  Assessment & Plan     Uncontrolled type 2 diabetes mellitus with hypoglycemia without coma (H)  Plan inject lantus at night, victoza before lunch  Continue current management with metformin   Monitor BG, and Follow up in 3 months       Chest wall pain  Likely pulled ligament/ muscle.   Supportive care offered.   Close follow up if no significant change or improvement as anticipated.      As for the Covid-19 vaccination, she is advised to follow the news on the public offerings and availability.     30 minutes spent on the date of the encounter doing chart review, patient visit and documentation          BMI:   Estimated body mass index is 27.7 kg/m  as calculated from the following:    Height as of 10/2/20: 5' 4.75\" (1.645 m).    Weight as of 10/2/20: 165 lb 3.2 oz (74.9 kg).       No follow-ups on file.    Anupam Rodney MD  [facility]     Nancy Hughes is 70 y.o. and presents to clinic today for the following health issues   HPI   She is having a questions about the Covid-19 vaccine as to when it would be offered to the public.   She also notes to having some pain/ discomfort just below the left lower rib following some physical activity showelling snow the other day.  The pain is said to  worsen with movement.    Diabetes   Low BG # in the monring , She injects the lantus in the morning   She denies any symptoms related to low BG.           Objective       Vitals:  No vitals were obtained today due to virtual visit.                Video-Visit Details    Type of service:  Video Visit    Video End Time (time video stopped): 10:13 AM  Originating " Location (pt. Location): Home    Distant Location (provider location):  [facility]     Platform used for Video Visit: Enedelia

## 2021-06-14 NOTE — TELEPHONE ENCOUNTER
Reason for Call:  Medication or medication refill:    Do you use a [facility]?  Name of the pharmacy and phone number for the current request: [facility]    Name of the medication requested: amlodipine 5mg    Other request: rx sent on 12/28/20 was set to class of No Print so [facility] not [facility]- please resend    Can we leave a detailed message on this number? Yes    Phone number patient can be reached at: Home number on file [phone] (home)    Best Time: today    Call taken on 1/4/2021 at 8:36 AM by Pura Mariano

## 2021-06-14 NOTE — TELEPHONE ENCOUNTER
Patient last seen 10/02/20    Please review and advise.  Thank you so much!  Arabella Velasquez, CMA

## 2021-06-15 NOTE — PATIENT INSTRUCTIONS - HE
1. Uncontrolled type 2 diabetes mellitus with hypoglycemia without coma (H)  - Glycosylated Hemoglobin A1c  - Basic Metabolic Panel  - Microalbumin, Random Urine  - Lipid Profile    Lantus:   Inject 20 units in the morning.   Metformin and victoza an hour before lunch  2nd dose of metformin take before dinner.     2. Leg cramps  - Magnesium    3. Benign Essential Hypertension  Monitor Blood pressure and take amlodipine 7.5 mg for consistent blood pressure >145/90

## 2021-06-15 NOTE — TELEPHONE ENCOUNTER
Refill Request  Did you contact pharmacy: No  Medication name:   Requested Prescriptions     Pending Prescriptions Disp Refills     amLODIPine (NORVASC) 5 MG tablet 90 tablet 3     Sig: Take 1 tablet (5 mg total) by mouth daily.     Who prescribed the medication: Dr. Lozano  Requested Pharmacy: [facility]  Is patient out of medication: Yes  Patient notified refills processed in 3 business days: yes  Okay to leave a detailed message: yes    Dr. COMER  Patient has an appt with you on 3/15/21 but she is out of her Amlodipine.

## 2021-06-15 NOTE — PROGRESS NOTES
[facility]  ENDOCRINOLOGY    Diabetes Note 1/16/2018    Nancy Hughes, 1950, [id number]          Reason for visit      1. Type 2 diabetes mellitus, uncontrolled        HPI     Nancy Hughes is a very pleasant 67 y.o. old female who presents for follow up.  SUMMARY:    Nancy is here with her  today for DM 2.  She tells me that this referral was partially her idea because she wanted to be able to spend more of her appointment time on just Diabetes.  She has been a diabetic for 8 years.  I have to hand it to her too, she has done a lot of research and understands more than the average person about her disease process and how to treat it.  She does not want to go on Prandial insulin again because of weight gain.  We spent some time discussing Diabetes as an energy storage problem.  She would really like to avoid it if at all possible.  She is currently taking Lantus and Victoza.  She tells me a horror story about when she started it she was terribly ill because she was taking it with her Lantus.  When she  the medication, she felt better.  She is taking 19 units currently and a full dose of Victoza daily.  She also taking Metformin. She has brought her meter with her and her morning numbers look pretty good.  The other numbers are not consistent in when she was taking them - some are post prandial and some are ante prandial.    Blood glucose data:  76 - 297    Past Medical History     Patient Active Problem List   Diagnosis     Hyperlipemia     Benign Essential Hypertension     Osteopenia     Type 2 diabetes mellitus, uncontrolled     Disorder of bone and cartilage, unspecified     Overweight     Diabetic retinopathy        Family History       family history includes Breast cancer in her paternal aunt; Breast cancer (age of onset: 81) in her mother; Colon cancer (age of onset: 64) in her brother.    Social History      reports that she has quit smoking. Her smoking use included Cigarettes. She has  "never used smokeless tobacco. She reports that she does not drink alcohol.      Review of Systems     Patient has no polyuria or polydipsia, no chest pain, dyspnea or TIA's, no numbness, tingling or pain in extremities  Remainder negative except as noted in HPI.    Vital Signs     /80 (Patient Site: Right Arm, Patient Position: Sitting, Cuff Size: Adult Regular)  Pulse 72  Ht 5' 4.5\" (1.638 m)  Wt 159 lb 4.8 oz (72.3 kg)  BMI 26.92 kg/m2  Wt Readings from Last 3 Encounters:   01/16/18 159 lb 4.8 oz (72.3 kg)   12/19/17 155 lb (70.3 kg)   09/19/17 157 lb 1.6 oz (71.3 kg)       Physical Exam     Constitutional:  Well developed, Well nourished  HENT:  Normocephalic,   Neck: Thyroid normal, No lymph nodes, Supple  Eyes:  PERRL, Conjunctiva pink  Respiratory:  Normal breath sounds, No respiratory distress  Cardiovascular:  Normal heart rate, Normal rhythm, No murmurs  GI:  Bowel sounds normal, Soft, No tenderness  Musculoskeletal:  No gross deformity or lesions, normal dorsalis pedis pulses  Skin: No acanthosis nigricans, lipoatrophy or lipodystrophy  Neurologic:  Alert & oriented x 3, nonfocal  Psychiatric:  Affect, Mood, Insight appropriate      Assessment     1. Type 2 diabetes mellitus, uncontrolled        Plan     I think that we can control her blood sugars with just the Lantus, but I need to get a better idea of what her numbers are consistently in the evening.  I have asked her to either test before dinner, or the same time after dinner. She will get me the numbers in a couple of weeks, and we will likely split her Lantus dosage then. She has had a couple of episodes of hypoglycemia first thing in the morning, so I have recommended a small evening snack (a cracker and piece of cheese), and she will try this.  She will follow up with me in clinic in 3 months.  Time spent with pt today: 30 min with >50% spent in counseling and coordination of care.        [facility]  1/16/2018  2:45 " PM      Lab Results     Hemoglobin A1c   Date Value Ref Range Status   01/04/2018 8.9 (H) 3.5 - 6.0 % Final   09/19/2017 8.2 (H) 3.5 - 6.0 % Final   06/14/2017 8.6 (H) 3.5 - 6.0 % Final   01/23/2017 7.5 (H) 3.5 - 6.0 % Final   10/13/2016 7.4 (H) 3.5 - 6.0 % Final     Creatinine   Date Value Ref Range Status   01/04/2018 0.68 0.60 - 1.10 mg/dL Final   06/14/2017 0.65 0.60 - 1.10 mg/dL Final   02/11/2016 0.65 0.60 - 1.10 mg/dL Final     Microalbumin, Random Urine   Date Value Ref Range Status   06/14/2017 0.57 0.00 - 1.99 mg/dL Final       Cholesterol   Date Value Ref Range Status   06/14/2017 178 <=199 mg/dL Final     HDL Cholesterol   Date Value Ref Range Status   06/14/2017 66 >=50 mg/dL Final     LDL Calculated   Date Value Ref Range Status   06/14/2017 103 <=129 mg/dL Final     Triglycerides   Date Value Ref Range Status   06/14/2017 46 <=149 mg/dL Final       Lab Results   Component Value Date    ALT 19 06/14/2017    AST 19 06/14/2017    ALKPHOS 68 06/14/2017    BILITOT 0.4 06/14/2017         Current Medications     Outpatient Medications Prior to Visit   Medication Sig Dispense Refill     aspirin 81 mg TbEF Take 1 tablet by mouth daily.       aspirin-acetaminophen-caffeine (EXCEDRIN MIGRAINE) 250-250-65 mg per tablet Take 1 tablet by mouth every 6 (six) hours as needed for pain.       calcium citrate-vitamin D (CALCIUM CITRATE +) 315-200 mg-unit per tablet Take 1 tablet by mouth 2 (two) times a day.       DOCUSATE CALCIUM (STOOL SOFTENER ORAL) Take by mouth.       insulin glargine (LANTUS; BASAGLAR) 100 unit/mL (3 mL) pen Inject 17 Units under the skin every morning. (Patient taking differently: Inject 19 Units under the skin every morning. ) 5 adj dose pen 6     lancets 30 gauge Misc Use 1 each As Directed 3 (three) times a day. Use one lancet with your Verio meter to check blood sugar as directed 300 each 3     liraglutide (VICTOZA) 0.6 mg/0.1 mL (18 mg/3 mL) PnIj injection Inject 0.3 mL (1.8 mg total) under  "the skin daily. With or without food. 18 mL 5     lisinopril (PRINIVIL,ZESTRIL) 40 MG tablet Take 1 tablet (40 mg total) by mouth daily. 90 tablet 3     MAGNESIUM CARBONATE ORAL Take by mouth.       metFORMIN (GLUCOPHAGE) 1000 MG tablet TAKE 1 TABLET TWICE A DAY WITH MEALS 180 tablet 0     MULTIVITAMIN (MULTIPLE VITAMIN ESSENTIAL ORAL) Take by mouth.       pen needle, diabetic (BD ULTRA-FINE SHERRY PEN NEEDLES) 32 gauge x 5/32\" Ndle USE TWICE A DAY WITH INSULIN  each 1     psyllium with dextrose (FIBER) powder Take by mouth 3 (three) times a day.       blood glucose meter (ONETOUCH ULTRA2) Dispense glucometer brand per patient's insurance at pharmacy discretion. Test BGs 3 times daily. 1 each 0     blood glucose test (CONTOUR NEXT STRIPS) strips Test 4 times daily as directed 360 strip 3     blood glucose test strips Use 1 test strip with your Verio meter to check blood sugar 3 times daily 300 strip 3     blood-glucose meter (CONTOUR NEXT EZ METER) Misc Use as directed 1 each 0     FLUZONE HIGH-DOSE 2017-18, PF, 180 mcg/0.5 mL Syrg injection TO BE ADMINISTERED BY PHARMACIST FOR IMMUNIZATION  0     lancets (MICROLET LANCET) Misc Use 4 times daily 150 each 3     liraglutide (VICTOZA) 0.6 mg/0.1 mL (18 mg/3 mL) PnIj injection Inject 0.3 mL (1.8 mg total) under the skin daily. With or without food. 9 mL 5     metFORMIN (GLUCOPHAGE) 1000 MG tablet TAKE 1 TABLET TWICE A DAY WITH MEALS 180 tablet 0     pravastatin (PRAVACHOL) 10 MG tablet TAKE 1 TABLET AT BEDTIME 90 tablet 3     No facility-administered medications prior to visit.            "

## 2021-06-15 NOTE — PROGRESS NOTES
"  Assessment & Plan       1. Uncontrolled type 2 diabetes mellitus with hypoglycemia without coma (H)  - Glycosylated Hemoglobin A1c  - Basic Metabolic Panel  - Microalbumin, Random Urine      - Lipid Profile  The 10-year ASCVD risk score ([place] SHER Jr., et al., 2013) is: 32.2%  Recommending statin ( if tolerated ) to mitigate the cardiovascular risk        Plan  Lantus:   Inject 20 units in the morning.   Metformin and victoza an hour before lunch  2nd dose of metformin take before dinner.     2. Leg cramps  Check electrolytes and magnesium    Low magnesium  Take 2 tabs daily   New prescription sent to the pharmacy    Mildly elevated potassium but stable in comparison  Elevated glucose (known diabetic), A1c of 9.6 elevated in comparison to 5 months ago      Keep hydrated  Consider muscle relaxer         3. Benign Essential Hypertension  Elevated blood pressure today   Plan:  Monitor Blood pressure and take amlodipine 7.5 mg for consistent blood pressure >145/90        40 minutes spent on the date of the encounter doing chart review, patient visit and documentation        BMI:   Estimated body mass index is 29.35 kg/m  as calculated from the following:    Height as of 10/2/20: 5' 4.75\" (1.645 m).    Weight as of this encounter: 175 lb (79.4 kg).       No follow-ups on file.    Anupam Rodney MD  [facility]   Nancy Hughes is 70 y.o. and presents today for med check and follow-up of diabetes, on Metformin, Victoza and Lantus.  She continues to be getting low blood sugar readings in the morning, as low as 50s range.  She has been taking her Lantus at various times including the day and nighttime without much of a difference in the blood sugar readings in the morning still recording low.  She notes that at times would forget taking her first dose of Metformin and/or Victoza.  She notes that her blood sugar readings are generally higher in the mid day or towards the " afternoon.    She also reports to having intermittent leg cramping, conscientious of keeping hydrated, and taking Neurontin at night for neuropathy.    She has a history of hyperlipidemia.  ASCVD score of 31.9% intolerable of the statins causing myalgia           Objective    /84   Pulse 75   Temp 97.2  F (36.2  C)   Wt 175 lb (79.4 kg)   SpO2 99%   BMI 29.35 kg/m    Body mass index is 29.35 kg/m .  Physical Exam

## 2021-06-16 PROBLEM — K63.5 POLYP OF COLON: Status: ACTIVE | Noted: 2019-10-11

## 2021-06-16 PROBLEM — Z79.4 TYPE 2 DIABETES MELLITUS WITH DIABETIC NEUROPATHY, WITH LONG-TERM CURRENT USE OF INSULIN (H): Status: ACTIVE | Noted: 2019-09-19

## 2021-06-16 PROBLEM — E11.40 TYPE 2 DIABETES MELLITUS WITH DIABETIC NEUROPATHY, WITH LONG-TERM CURRENT USE OF INSULIN (H): Status: ACTIVE | Noted: 2019-09-19

## 2021-06-16 PROBLEM — D12.2 BENIGN NEOPLASM OF ASCENDING COLON: Status: ACTIVE | Noted: 2019-10-15

## 2021-06-16 PROBLEM — Z86.0100 HISTORY OF COLONIC POLYPS: Status: ACTIVE | Noted: 2019-10-15

## 2021-06-16 NOTE — TELEPHONE ENCOUNTER
Medication: ezetimibe 10mg   Last Date Filled: 03/25/21  Last appointment addressing medication: 03/15/21  Last B/P:  BP Readings from Last 3 Encounters:   03/15/21 152/84   10/02/20 138/80   09/09/20 165/90     Last labs pertaining to refill: 03/15/21      Pend medication and associate diagnosis before routing to Provider for review.       If patient has not been seen in over 1 year, pend 30 day supply and notify patient they are due for an appointment before any further refills.

## 2021-06-16 NOTE — TELEPHONE ENCOUNTER
Telephone Encounter by Lorraine Diez at 9/23/2019  1:15 PM     Author: Lorraine Diez Service: -- Author Type: --    Filed: 9/23/2019  1:17 PM Encounter Date: 9/18/2019 Status: Signed    : Lorraine Diez APPROVED:    Approval start date:08/24/2019  Approval end date:09/22/2020    Pharmacy has been notified of approval and will contact patient when medication is ready for pickup.

## 2021-06-16 NOTE — TELEPHONE ENCOUNTER
Disp Refills Start End JAYSON   magnesium oxide 250 mg magnesium Tab 180 tablet 0 3/19/2021  No   Sig - Route: Take 1 tablet (250 mg total) by mouth 2 (two) times a day. - Oral   Sent to pharmacy as: magnesium 250 mg (as magnesium oxide) tablet   E-Prescribing Status: Receipt confirmed by pharmacy (3/19/2021  5:17 PM CDT)       Should pt purchase OTC magnesium?

## 2021-06-16 NOTE — TELEPHONE ENCOUNTER
pls call patient:   Non-Statins are not recommended as Most agents aside from Statins do not lower cardiovascular risk significantly and are more expensive.  However, they are considered if the Statins are not tolerated.     Recommending Zetia.   Side effects are minimal and may include GI side effects ( diarrhea , increased flatulence )  If agreed, will send the prescription.        As for magnesium, may take OTC , 2 tabs daily

## 2021-06-16 NOTE — TELEPHONE ENCOUNTER
Called and lvm for patient. Asked if she is out of medication we can get her in sooner then scheduled lab appt on 5/5.    Please assist with scheduling sooner lab is patient needs this.

## 2021-06-16 NOTE — TELEPHONE ENCOUNTER
Pt states she is not willing to take statin due to leg cramps. What else can she do   Pt states magnesium is not covered by her insurance, any other suggestions. Can she take a OTC med?

## 2021-06-16 NOTE — TELEPHONE ENCOUNTER
Called to patient and relayed message. She is going to check with her insurance and see if this is on her formulary. She will let us know once she finds this out.

## 2021-06-16 NOTE — TELEPHONE ENCOUNTER
----- Message from Anupam Rodney MD sent at 3/19/2021  5:17 PM CDT -----  Please call patient:  The 10-year ASCVD risk score ([place]monisha OLIVAREZ Jr., et al., 2013) is: 32.2%  Recommending statin ( if tolerated ) to mitigate the cardiovascular risk    Low magnesium   Take 2 tabs daily   New prescription sent to the pharmacy     Mildly elevated potassium but stable in comparison   Elevated glucose (known diabetic), A1c of 9.6 elevated in comparison to 5 months ago

## 2021-06-16 NOTE — TELEPHONE ENCOUNTER
Patient needs medication that requires a lab. She has an appointment on 5-5-21, but only has 3 days remaining of medication.  Transferred to a  for an appointment to check labs sooner. Transferred to a  for an appointment. Patient verbalized understanding and agrees with plan.     Linda Aguilar RN  [facility] Nurse Advisor  12:30 PM 4/25/2021

## 2021-06-17 NOTE — PATIENT INSTRUCTIONS - HE
Patient Instructions by Aaron Toledo DO at 10/30/2019 10:00 AM     Author: Aaron Toledo DO Service: -- Author Type: Physician    Filed: 10/30/2019 10:42 AM Encounter Date: 10/30/2019 Status: Signed    : Aaron Toledo DO (Physician)         Patient Education   Signs of Hearing Loss  Hearing loss is a problem shared by many people. In fact, it is one of the most common health conditions, particularly as people age. Most people over age 65 have some hearing loss, and by age 80, almost everyone does. Because hearing loss usually occurs slowly over the years, you may not realize your hearing ability has gotten worse.       Have your hearing checked  Contact your [facility] care provider if you:    Have to strain to hear normal conversation.    Have to watch other peoples faces very carefully to follow what theyre saying.    Need to ask people to repeat what theyve said.    Often misunderstand what people are saying.    Turn the volume of the television or radio up so high that others complain.    Feel that people are mumbling when theyre talking to you.    Find that the effort to hear leaves you feeling tired and irritated.    Notice, when using the phone, that you hear better with 1 ear than the other.    9295-2219 [employer]. [address] [place] [address], [place] [address]. All rights reserved. This information is not intended as a substitute for professional medical care. Always follow your healthcare professional's instructions.         Patient Education   Urinary Incontinence, Female (Adult)  Urinary incontinence means loss of control of the bladder. This problem affects many women, especially as they get older. If you have incontinence, you may be embarrassed to ask for help. But know that this problem can be treated.  Types of Incontinence  There are different types of incontinence. Two of the main types are described here. You can have more than one  type.    Stress incontinence. With this type, urine leaks when pressure (stress) is put on the bladder. This may happen when you cough, sneeze, or laugh. Stress incontinence most often occurs because the pelvic floor muscles that support the bladder and urethra are weak. This can happen after pregnancy and vaginal childbirth or a hysterectomy. It can also be due to excess body weight or hormone changes.    Urge incontinence (also called overactive bladder). With this type, a sudden urge to urinate is felt often. This may happen even though there may not be much urine in the bladder. The need to urinate often during the night is common. Urge incontinence most often occurs because of bladder spasms. This may be due to bladder irritation or infection. Damage to bladder nerves or pelvic muscles, constipation, and certain medicines can also lead to urge incontinence.  Treatment of urinary incontinence depends on the cause. Further evaluation is needed to find the type you have. This will likely include an exam and certain tests. Based on the results, you and your healthcare provider can then plan treatment. Until a diagnosis is made, the home care tips below can help relieve symptoms.  Home care    Do pelvic floor muscle exercises, if they are prescribed. The pelvic floor muscles help support the bladder and urethra. Many women find that their symptoms improve when doing special exercises that strengthen these muscles. To do the exercises contract the muscles you would use to stop your stream of urine, but do this when youre not urinating. Hold for 10 seconds, then relax. Repeat 10 to 20 times in a row, at least 3 times a day. Your provider may give you other instructions for how to do the exercises and how often.    Keep a bladder diary. This helps track how often and how much you urinate over a set period of time. Bring this diary with you to your next visit with the provider. The information can help your provider  learn more about your bladder problem.    Lose weight, if advised to by your provider. Excess weight puts pressure on the bladder. Your provider can help you create a weight-loss plan thats right for you. This may include exercising more and making certain diet changes.    Don't consume foods and drinks that may irritate the bladder. These can include alcohol and caffeinated drinks.    Quit smoking. Smoking and other tobacco use can lead to chronic cough that strains the pelvic floor muscles. Smoking may also damage the bladder and urethra. Talk with your provider about treatments or methods you can use to quit smoking.    If drinking large amounts of fluid causes you to have symptoms, you may be advised to limit your fluid intake. You may also be advised to drink most of your fluids during the day and to limit fluids at night.    If youre worried about urine leakage or accidents, you may wear absorbent pads to catch urine. Change the pads often. This helps reduce discomfort. It may also reduce the risk of skin or bladder infections.  Follow-up care  Follow up with your healthcare provider, or as directed. It may take some to find the right treatment for your problem. Your treatment plan may include special therapies or medicines. Certain procedures or surgery may also be options. Be sure to discuss any questions you have with your provider.  When to seek medical advice  Call the healthcare provider right away if any of these occur:    Fever of 100.4 F (38 C) or higher, or as directed by your provider    Bladder pain or fullness    Abdominal swelling    Nausea or vomiting    Back pain    Weakness, dizziness or fainting  Date Last Reviewed: 10/1/2017    2888-9725 [employer]. [address] [place] [address], [place] [address]. All rights reserved. This information is not intended as a substitute for professional medical care. Always follow your healthcare professional's instructions.       Advance  Directive  Patients advance directive was discussed and I am comfortable with the patients wishes.  Patient Education   Personalized Prevention Plan  You are due for the preventive services outlined below.  Your care team is available to assist you in scheduling these services.  If you have already completed any of these items, please share that information with your care team to update in your medical record.  Health Maintenance   Topic Date Due   ? HEPATITIS C SCREENING  1950   ? PNEUMOCOCCAL IMMUNIZATION 65+ LOW/MEDIUM RISK (2 of 2 - PPSV23) 07/24/2016   ? MEDICARE ANNUAL WELLNESS VISIT  06/14/2018   ? DIABETES FOOT EXAM  08/21/2019   ? DIABETES FOLLOW-UP  12/19/2019   ? DIABETES HEMOGLOBIN A1C  03/19/2020   ? DIABETES URINE MICROALBUMIN  06/19/2020   ? FALL RISK ASSESSMENT  09/19/2020   ? DIABETES OPHTHALMOLOGY EXAM  09/30/2020   ? DXA SCAN  07/09/2021   ? MAMMOGRAM  07/12/2021   ? TD 18+ HE  02/05/2024   ? COLONOSCOPY  10/11/2024   ? ADVANCE CARE PLANNING  10/30/2024   ? INFLUENZA VACCINE RULE BASED  Completed   ? ZOSTER VACCINES  Completed

## 2021-06-17 NOTE — TELEPHONE ENCOUNTER
Telephone Encounter by Ilsa Schmidt LPN at 4/30/2021 12:58 PM     Author: Ilsa Schmidt LPN Service: -- Author Type: Licensed Nurse    Filed: 4/30/2021 12:59 PM Encounter Date: 4/19/2021 Status: Signed    : Ilsa Schmidt LPN (Licensed Nurse)       Labs were completed on 4/26 and was advised to continue current management

## 2021-06-17 NOTE — TELEPHONE ENCOUNTER
Refill Request  Did you contact pharmacy: Yes  Medication name:   Requested Prescriptions     Pending Prescriptions Disp Refills     blood glucose test strips 300 strip 3     Sig: Dispense brand per patient's insurance at pharmacy discretion. Patient test times a day.     Who prescribed the medication: Dr. COMER  Requested Pharmacy: [facility]  Is patient out of medication: Yes  Patient notified refills processed in 3 business days:  yes  Okay to leave a detailed message: yes

## 2021-06-17 NOTE — PATIENT INSTRUCTIONS - HE
Patient Instructions by Claudine López PT at 6/25/2019  1:00 PM     Author: Claudine López PT Service: -- Author Type: Physical Therapist    Filed: 6/25/2019  2:18 PM Encounter Date: 6/25/2019 Status: Signed    : Claudine López PT (Physical Therapist)        GASTROCNEMIUS STRETCH    While standing and leaning against a wall, place one foot back behind you and bend the front knee until a gentle stretch is felt on the back of the lower leg.     Your back knee should be straight the entire time.      Do this standing with a towel or a book under the ball of your foot the opposite leg can go forward or backward to adjust the tension. Hold until the muscle relaxes.       Sitting scoot to the edge of the chair and have your foot relaxed and sit up straight.  Hold until it relaxes.      Medhat to hold the muscle on the inside of Nancy's thigh hold it until the muscle relaxes

## 2021-06-18 NOTE — PROGRESS NOTES
[facility]  ENDOCRINOLOGY    Diabetes Note 5/16/2018    Nancy Hughes, 1950, [id number]          Reason for visit      1. Type 2 diabetes mellitus, uncontrolled    2. Upper respiratory tract infection, unspecified type    3. Diabetes        HPI     Nancy Hughes is a very pleasant 67 y.o. old female who presents for follow up.  SUMMARY:  Nancy returns today in f/u for DM 2.  Her current A1c is 9.2, which is up from her last at 8.9.  She is currently taking Basaglar in a divided dose at 16 units in the morning and 6 units in the evening.  She is not taking any insulin with meals.  She ran out of [employer] on April 24th and did not return to it.  She has also had the flu recently and she cannot shake off the cough. Her meter download shows that she is only testing once a day.  She has had an average BG of 156 over the last month, based on that one test a day.  She has had some hypoglycemia, but nothing that required outside assistance.        Blood glucose data:  Ave: 156, SD: 90    Past Medical History     Patient Active Problem List   Diagnosis     Hyperlipemia     Benign Essential Hypertension     Osteopenia     Type 2 diabetes mellitus, uncontrolled     Disorder of bone and cartilage, unspecified     Overweight     Diabetic retinopathy     Upper respiratory infection        Family History       family history includes Breast cancer in her paternal aunt; Breast cancer (age of onset: 81) in her mother; Colon cancer (age of onset: 64) in her brother.    Social History      reports that she has quit smoking. Her smoking use included Cigarettes. She has never used smokeless tobacco. She reports that she does not drink alcohol.      Review of Systems     Patient has no polyuria or polydipsia, no chest pain, dyspnea or TIA's, no numbness, tingling or pain in extremities  Remainder negative except as noted in HPI.    Vital Signs     /84 (Patient Site: Right Arm, Patient Position: Sitting, Cuff Size: Adult Regular)   "Pulse 84  Ht 5' 4.5\" (1.638 m)  Wt 167 lb 11.2 oz (76.1 kg)  BMI 28.34 kg/m2  Wt Readings from Last 3 Encounters:   05/15/18 167 lb 11.2 oz (76.1 kg)   01/16/18 159 lb 4.8 oz (72.3 kg)   12/19/17 155 lb (70.3 kg)       Physical Exam     Constitutional:  Well developed, Well nourished  HENT:  Normocephalic,   Neck: Thyroid normal, No lymph nodes, Supple  Eyes:  PERRL, Conjunctiva pink  Respiratory:  Normal breath sounds, No respiratory distress  Cardiovascular:  Normal heart rate, Normal rhythm, No murmurs  GI:  Bowel sounds normal, Soft, No tenderness  Musculoskeletal:  No gross deformity or lesions, normal dorsalis pedis pulses  Skin: No acanthosis nigricans, lipoatrophy or lipodystrophy  Neurologic:  Alert & oriented x 3, nonfocal  Psychiatric:  Affect, Mood, Insight appropriate  Diabetic foot exam: no ulcers, charcot's or high risk calluses, Normal monofilament exam        Assessment     1. Type 2 diabetes mellitus, uncontrolled    2. Upper respiratory tract infection, unspecified type    3. Diabetes        Plan     I would like for Nancy to restart the Victoza, as she was doing better on it.  She will continue on the Basaglar as she has been taking it, as well as the Metformin 1000 mg BID.  She will get me her blood sugars in a couple of weeks so we can see how well it is working.  She will f/u in 3 months. Time spent with pt today: 25 min with >50% spent in counseling and coordination of care.      [facility]  5/16/2018  2:18 PM      Lab Results     Hemoglobin A1c   Date Value Ref Range Status   04/17/2018 9.2 (H) 3.5 - 6.0 % Final   01/04/2018 8.9 (H) 3.5 - 6.0 % Final   09/19/2017 8.2 (H) 3.5 - 6.0 % Final   06/14/2017 8.6 (H) 3.5 - 6.0 % Final   01/23/2017 7.5 (H) 3.5 - 6.0 % Final     Creatinine   Date Value Ref Range Status   01/04/2018 0.68 0.60 - 1.10 mg/dL Final   06/14/2017 0.65 0.60 - 1.10 mg/dL Final   02/11/2016 0.65 0.60 - 1.10 mg/dL Final     Microalbumin, Random Urine " "  Date Value Ref Range Status   06/14/2017 0.57 0.00 - 1.99 mg/dL Final       Cholesterol   Date Value Ref Range Status   06/14/2017 178 <=199 mg/dL Final     HDL Cholesterol   Date Value Ref Range Status   06/14/2017 66 >=50 mg/dL Final     LDL Calculated   Date Value Ref Range Status   06/14/2017 103 <=129 mg/dL Final     Triglycerides   Date Value Ref Range Status   06/14/2017 46 <=149 mg/dL Final       Lab Results   Component Value Date    ALT 19 06/14/2017    AST 19 06/14/2017    ALKPHOS 68 06/14/2017    BILITOT 0.4 06/14/2017         Current Medications     Outpatient Medications Prior to Visit   Medication Sig Dispense Refill     aspirin 81 mg TbEF Take 1 tablet by mouth daily.       aspirin-acetaminophen-caffeine (EXCEDRIN MIGRAINE) 250-250-65 mg per tablet Take 1 tablet by mouth every 6 (six) hours as needed for pain.       BD ULTRA-FINE SHERRY PEN NEEDLES 32 gauge x 5/32\" Ndle USE TWICE A DAY WITH INSULIN  each 1     calcium citrate-vitamin D (CALCIUM CITRATE +) 315-200 mg-unit per tablet Take 1 tablet by mouth 2 (two) times a day.       DOCUSATE CALCIUM (STOOL SOFTENER ORAL) Take by mouth.       insulin glargine (LANTUS; BASAGLAR) 100 unit/mL (3 mL) pen Inject 17 Units under the skin every morning. (Patient taking differently: Inject 22 Units under the skin every morning. Take 16 unit in am; 6 in pm) 5 adj dose pen 6     lancets 30 gauge Misc Use 1 each As Directed 3 (three) times a day. Use one lancet with your Verio meter to check blood sugar as directed 300 each 3     lisinopril (PRINIVIL,ZESTRIL) 40 MG tablet Take 1 tablet (40 mg total) by mouth daily. 90 tablet 2     MAGNESIUM CARBONATE ORAL Take by mouth.       metFORMIN (GLUCOPHAGE) 1000 MG tablet Take 1 tablet (1,000 mg total) by mouth 2 (two) times a day with meals. 180 tablet 0     MULTIVITAMIN (MULTIPLE VITAMIN ESSENTIAL ORAL) Take by mouth.       psyllium with dextrose (FIBER) powder Take by mouth 3 (three) times a day.       LANTUS " SOLOSTAR 100 unit/mL (3 mL) pen INJECT 16 UNITS UNDER THE SKIN EVERY MORNING 15 mL 6     liraglutide (VICTOZA) 0.6 mg/0.1 mL (18 mg/3 mL) PnIj injection Inject 0.3 mL (1.8 mg total) under the skin daily. With or without food. 18 mL 5     No facility-administered medications prior to visit.

## 2021-06-20 NOTE — PROGRESS NOTES
"[facility]  ENDOCRINOLOGY    Diabetes Note 8/22/2018    Nancy Hughes, 1950, [id number]          Reason for visit      1. Type 2 diabetes mellitus, uncontrolled (H)        HPI     Nancy Hughes is a very pleasant 68 y.o. old female who presents for follow up.  SUMMARY:  Nancy returns today in f/u for DM 2.  She is here with her , per usual.  She had dropped her A1c from 9.3 to 8.1, and she states that it is because she is back on the Victoza. It is terribly expensive, but they have found that it really makes a difference in her blood sugars, so they are willing to spend for it.  She is also taking 22 units of Lantus in the morning.  She has had some hypoglycemia early in the morning.  Otherwise, her FBS have all been within range.  She had an ave BG of 118 over the last month.       Blood glucose data:  Ave: 118, SD: 59    Past Medical History     Patient Active Problem List   Diagnosis     Hyperlipemia     Benign Essential Hypertension     Osteopenia     Type 2 diabetes mellitus, uncontrolled (H)     Disorder of bone and cartilage, unspecified     Overweight     Diabetic retinopathy (H)     Upper respiratory infection        Family History       family history includes Breast cancer in her paternal aunt; Breast cancer (age of onset: 81) in her mother; Colon cancer (age of onset: 64) in her brother.    Social History      reports that she has quit smoking. Her smoking use included Cigarettes. She has never used smokeless tobacco. She reports that she does not drink alcohol.      Review of Systems     Patient has no polyuria or polydipsia, no chest pain, dyspnea or TIA's, no numbness, tingling or pain in extremities  Remainder negative except as noted in HPI.    Vital Signs     /86 (Patient Site: Right Arm, Patient Position: Sitting, Cuff Size: Adult Regular)  Pulse 88  Ht 5' 4.5\" (1.638 m)  Wt 167 lb (75.8 kg)  BMI 28.22 kg/m2  Wt Readings from Last 3 Encounters:   08/21/18 167 lb (75.8 kg) "   05/15/18 167 lb 11.2 oz (76.1 kg)   01/16/18 159 lb 4.8 oz (72.3 kg)       Physical Exam     Constitutional:  Well developed, Well nourished  HENT:  Normocephalic,   Neck: Thyroid normal, No lymph nodes, Supple  Eyes:  PERRL, Conjunctiva pink  Respiratory:  Normal breath sounds, No respiratory distress  Cardiovascular:  Normal heart rate, Normal rhythm, No murmurs  GI:  Bowel sounds normal, Soft, No tenderness  Musculoskeletal:  No gross deformity or lesions, normal dorsalis pedis pulses  Skin: No acanthosis nigricans, lipoatrophy or lipodystrophy  Neurologic:  Alert & oriented x 3, nonfocal  Psychiatric:  Affect, Mood, Insight appropriate  Diabetic foot exam: no ulcers, charcot's or high risk calluses, Normal monofilament exam        Assessment     1. Type 2 diabetes mellitus, uncontrolled (H)        Plan     Pt is stable on her current medications and dosages.  No changes today.  She will f/u with me in 6 months. Time spent with pt today: 25 min with >50% spent in counseling and coordination of care.      Lubna Sin   Endocrinology  8/22/2018  12:44 PM      Lab Results     Hemoglobin A1c   Date Value Ref Range Status   08/15/2018 8.1 (H) 4.2 - 6.1 % Final   04/17/2018 9.2 (H) 3.5 - 6.0 % Final   01/04/2018 8.9 (H) 3.5 - 6.0 % Final   09/19/2017 8.2 (H) 3.5 - 6.0 % Final   06/14/2017 8.6 (H) 3.5 - 6.0 % Final     Creatinine   Date Value Ref Range Status   08/15/2018 0.66 0.60 - 1.10 mg/dL Final   01/04/2018 0.68 0.60 - 1.10 mg/dL Final   06/14/2017 0.65 0.60 - 1.10 mg/dL Final     Microalbumin, Random Urine   Date Value Ref Range Status   08/15/2018 <0.50 0.00 - 1.99 mg/dL Final       Cholesterol   Date Value Ref Range Status   06/14/2017 178 <=199 mg/dL Final     HDL Cholesterol   Date Value Ref Range Status   06/14/2017 66 >=50 mg/dL Final     LDL Calculated   Date Value Ref Range Status   06/14/2017 103 <=129 mg/dL Final     Triglycerides   Date Value Ref Range Status   06/14/2017 46 <=149 mg/dL Final  "      Lab Results   Component Value Date    ALT 19 06/14/2017    AST 19 06/14/2017    ALKPHOS 68 06/14/2017    BILITOT 0.4 06/14/2017         Current Medications     Outpatient Medications Prior to Visit   Medication Sig Dispense Refill     aspirin 81 mg TbEF Take 1 tablet by mouth daily.       aspirin-acetaminophen-caffeine (EXCEDRIN MIGRAINE) 250-250-65 mg per tablet Take 1 tablet by mouth every 6 (six) hours as needed for pain.       BD ULTRA-FINE SHERRY PEN NEEDLES 32 gauge x 5/32\" Ndle USE TWICE A DAY WITH INSULIN  each 1     blood glucose test strips Use 1 each As Directed as needed. Dispense brand per patient's insurance at pharmacy discretion. 300 strip 1     calcium citrate-vitamin D (CALCIUM CITRATE +) 315-200 mg-unit per tablet Take 1 tablet by mouth 2 (two) times a day.       DOCUSATE CALCIUM (STOOL SOFTENER ORAL) Take by mouth.       insulin glargine (LANTUS; BASAGLAR) 100 unit/mL (3 mL) pen Inject 17 Units under the skin every morning. (Patient taking differently: Inject 22 Units under the skin every morning. Take 16 unit in am; 6 in pm) 5 adj dose pen 6     lancets 30 gauge Misc Use 1 each As Directed 3 (three) times a day. Use one lancet with your Verio meter to check blood sugar as directed 300 each 3     liraglutide (VICTOZA) 0.6 mg/0.1 mL (18 mg/3 mL) PnIj injection Inject 1.8 mg under the skin daily. With or without food. (Patient taking differently: Inject 1.2 mg under the skin daily. With or without food.) 9 Syringe 0     lisinopril (PRINIVIL,ZESTRIL) 40 MG tablet Take 1 tablet (40 mg total) by mouth daily. 90 tablet 2     MAGNESIUM CARBONATE ORAL Take by mouth.       metFORMIN (GLUCOPHAGE) 1000 MG tablet Take 1 tablet (1,000 mg total) by mouth 2 (two) times a day with meals. 180 tablet 0     MULTIVITAMIN (MULTIPLE VITAMIN ESSENTIAL ORAL) Take by mouth.       pen needle, diabetic (BD ULTRA-FINE SHERRY PEN NEEDLE) 32 gauge x 5/32\" Ndle Use daily with insulin 300 each 3     psyllium with " dextrose (FIBER) powder Take by mouth 3 (three) times a day.       No facility-administered medications prior to visit.

## 2021-06-21 NOTE — PROGRESS NOTES
Assessment / Impression     1. Hospital discharge follow-up     2. Angioedema, subsequent encounter     3. Insulin dependent diabetes mellitus (H)  Glycosylated Hemoglobin A1c    gabapentin (NEURONTIN) 300 MG capsule    DISCONTINUED: gabapentin (NEURONTIN) 300 MG capsule   4. Type 2 diabetes mellitus with diabetic neuropathy, with long-term current use of insulin (H)  metFORMIN (GLUCOPHAGE) 1000 MG tablet   5. Benign Essential Hypertension  amLODIPine (NORVASC) 5 MG tablet   6. Hyponatremia  Basic Metabolic Panel   7. Right-sided low back pain with right-sided sciatica, unspecified chronicity     8. Sinusitis, unspecified chronicity, unspecified location           Plan:     She appears to be doing better since being discharged from the hospital.  She is no longer on lisinopril.  Her blood pressure is still running a little high on amlodipine 5 mg daily.  She is going to return to the clinic for a nurse only blood pressure check in about 3 weeks.  If the pressure still running high we may increase the amlodipine dose to 10 mg daily.  Recommended that we repeat a hemoglobin A1c.  She will continue to follow-up with endocrinology.  Her next appointment should be in about 3 months from now.  We are repeating metabolic panel today.  Her sodium was 130 prior to discharge.  Demonstrated stretches she can do that it may help the sciatica pain symptoms.  She will continue to monitor the nasal drainage for now.  I do not think she needs another round of antibiotics at this time.  I personally reviewed the hospital discharge summary including the lab and CT scan results.    Subjective:      HPI: Nancy Hughes is a 68 y.o. female who presents to the clinic for hospital discharge follow-up visit.  She was hospitalized from 11/7/18-11/8/18 for tongue swelling and edema of the right mandibular space due to angioedema from lisinopril.  Her symptoms quickly improved after stopping lisinopril and starting dexamethasone and  "epinephrine.  She had a CT scan of the facial bones with contrast that showed diffuse edema involving the right submandibular space with extension of the edema to the right side of the hypopharynx and right posterior lateral tongue base.  There is no evidence of abscess formation.  There was no discrete dental disease noted.  There was evidence of mild mucosal thickening of the maxillary sinuses bilaterally.  She was having sinusitis symptoms at the time and was discharged on Augmentin. Blood cultures were negative.  She reports that the symptoms have improved, but she still has, clear nasal discharge.  Her sodium was low at 126 upon admission.  This was felt to be due to hypovolemia. This Improved to 130 by discharge.  Describes having leg cramps pain symptoms that were felt to be due to peripheral neuropathy.  She was started on gabapentin which has been very helpful.  She is taking 300 mg at bedtime.  She also describes having sciatica pain of the right side.  This goes in the right buttock down the back of her right thigh into the knee.  She does not have radiation below the knee.        Review of Systems  All other systems reviewed and are negative.     Social History     Tobacco Use   Smoking Status Former Smoker     Types: Cigarettes   Smokeless Tobacco Never Used       Family History   Problem Relation Age of Onset     Breast cancer Mother 81     Colon cancer Brother 64     Breast cancer Paternal Aunt        Objective:     /80 (Patient Site: Right Arm, Patient Position: Sitting, Cuff Size: Adult Regular)   Pulse 80   Temp 99.1  F (37.3  C) (Oral)   Resp 16   Ht 5' 5\" (1.651 m)   Wt 162 lb (73.5 kg)   BMI 26.96 kg/m    Physical Examination: General appearance - alert, well appearing, and in no distress  Eyes: pupils equal and reactive, extraocular eye movements intact  Mouth: mucous membranes moist, pharynx normal without lesions  Neck: supple, no significant adenopathy  Lungs: clear to " "auscultation, no wheezes, rales or rhonchi, symmetric air entry  Heart: normal rate, regular rhythm, normal S1, S2, no murmurs, rubs, clicks or gallops  Abdomen: soft, nontender, nondistended, no masses or organomegaly  Neurological: alert, oriented, normal speech, no focal findings or movement disorder noted.    Extremities: No edema, no clubbing or cyanosis.  Straight leg raise negative bilaterally.  Psychiatric: Normal affect. Does not appear anxious or depressed.  Back: Nontender over the lumbar spine.  No results found for this or any previous visit (from the past 168 hour(s)).    Current Outpatient Medications   Medication Sig     amLODIPine (NORVASC) 5 MG tablet Take 1 tablet (5 mg total) by mouth daily.     aspirin 81 mg TbEF Take 1 tablet by mouth daily.     aspirin-acetaminophen-caffeine (EXCEDRIN MIGRAINE) 250-250-65 mg per tablet Take 1 tablet by mouth every 6 (six) hours as needed for pain.     BD ULTRA-FINE SHERRY PEN NEEDLES 32 gauge x 5/32\" Ndle USE TWICE A DAY WITH INSULIN PEN     blood glucose test strips Use 1 each As Directed as needed. Dispense brand per patient's insurance at pharmacy discretion.     calcium citrate-vitamin D (CALCIUM CITRATE +) 315-200 mg-unit per tablet Take 1 tablet by mouth 2 (two) times a day.     DOCUSATE CALCIUM (STOOL SOFTENER ORAL) Take 100 mg by mouth daily.      gabapentin (NEURONTIN) 300 MG capsule Take 1 capsule (300 mg total) by mouth at bedtime.     insulin glargine (LANTUS; BASAGLAR) 100 unit/mL (3 mL) pen Inject 22 Units under the skin every morning.     lancets 30 gauge Misc Use 1 each As Directed 3 (three) times a day. Use one lancet with your Verio meter to check blood sugar as directed     liraglutide (VICTOZA) 0.6 mg/0.1 mL (18 mg/3 mL) PnIj injection Inject 1.8 mg under the skin daily. With or without food.     magnesium oxide 250 mg Tab Take 250 mg by mouth daily.     metFORMIN (GLUCOPHAGE) 1000 MG tablet Take 1 tablet (1,000 mg total) by mouth 2 (two) " "times a day with meals.     MULTIVITAMIN (MULTIPLE VITAMIN ESSENTIAL ORAL) Take 1 tablet by mouth daily.      pen needle, diabetic (BD ULTRA-FINE SHERRY PEN NEEDLE) 32 gauge x 5/32\" Ndle Use daily with insulin     psyllium with dextrose (FIBER) powder Take by mouth 3 (three) times a day. 3 scoops every morning     "

## 2021-06-21 NOTE — LETTER
"Letter by Anupam Rodney MD at      Author: Anupam Rodney MD Service: -- Author Type: --    Filed:  Encounter Date: 3/22/2021 Status: (Other)         Nancy Hughes  [address]  [place] [address]             March 22, 2021         Dear Ms. Hughes,    Below are the results from your recent visit:  Low magnesium   Take 2 tabs daily   New prescription sent to the pharmacy     Mildly elevated potassium but stable in comparison   Elevated glucose (known diabetic), A1c of 9.6 elevated in comparison to 5 months ago     Resulted Orders   Glycosylated Hemoglobin A1c   Result Value Ref Range    Hemoglobin A1c 9.6 (H) <=5.6 %   Basic Metabolic Panel   Result Value Ref Range    Sodium 135 (L) 136 - 145 mmol/L    Potassium 5.2 (H) 3.5 - 5.0 mmol/L    Chloride 96 (L) 98 - 107 mmol/L    CO2 26 22 - 31 mmol/L    Anion Gap, Calculation 13 5 - 18 mmol/L    Glucose 269 (H) 70 - 125 mg/dL    Calcium 10.1 8.5 - 10.5 mg/dL    BUN 14 8 - 28 mg/dL    Creatinine 0.77 0.60 - 1.10 mg/dL    GFR MDRD Af Amer >60 >60 mL/min/1.73m2    GFR MDRD Non Af Amer >60 >60 mL/min/1.73m2    Narrative    Fasting Glucose reference range is 70-99 mg/dL per  American Diabetes Association (ADA) guidelines.   Microalbumin, Random Urine   Result Value Ref Range    Microalbumin, Random Urine <0.50 0.00 - 1.99 mg/dL    Creatinine, Urine 14.1 mg/dL    Microalbumin/Creatinine Ratio Random Urine        Comment:      \"Unable to calculate: Creatinine and/or Microalbumin value below detectable level\"    Narrative    Microalbumin, Random Urine  <2.0 mg/dL . . . . . . . . Normal  3.0-30.0 mg/dL . . . . . . Microalbuminuria  >30.0 mg/dL . . . . . .  . Clinical Proteinuria    Microalbumin/Creatinine Ratio, Random Urine  <20 mg/g . . . . .. . . . Normal   mg/g . . . . . . . Microalbuminuria  >300 mg/g . . . . . . . . Clinical Proteinuria       Lipid Profile   Result Value Ref Range    Triglycerides 74 <=149 mg/dL    Cholesterol 190 <=199 " mg/dL    LDL Calculated 100 <=129 mg/dL    HDL Cholesterol 75 >=50 mg/dL    Patient Fasting > 8hrs? No    Magnesium   Result Value Ref Range    Magnesium 1.7 (L) 1.8 - 2.6 mg/dL         Please call with questions or contact us using [employer].    Sincerely,        Electronically signed by Anupam Rodney MD

## 2021-06-21 NOTE — LETTER
Letter by Aaron Toledo DO at      Author: Aaron Toledo DO Service: -- Author Type: --    Filed:  Encounter Date: 1/4/2021 Status: (Other)         Nancy Hughes  [address]  [place] [address]      01/04/21      Dear Nancy,    We have received your medical message.  Effective December 7th, Aaron Hummel DO will no longer see patients at [facility].  If you would like to follow his practice to the [facility] Location please call to schedule your appointment.    [address] [place], [place]  Scheduling appointments [phone] or [phone]  [email or web address]    Our clinic staff is dedicated to helping you make a smooth transition to a new healthcare provider. The following providers at our clinic would be happy to partner with you for your  healthcare needs:    ? Anupam Jama MD Family Medicine  ? Fabián Brock MD   ? Reid Shepherd MD  ? Nadira Ramírez NP Internal Medicine  ? Kisha Vera MD Internal Medicine  ? Kamilah Moody MD Family Medicine    Please note: If you currently have a prescription for medication and have no refills remaining, you need to establish care with a new provider before getting your prescription refilled.      If you have any questions or want more information about providers at our clinic or other [facility] Clinics, please visit [employer] [email or web address] or call [phone] (toll-free). Thank you for choosing [facility] for your medical care.    I wish you and your family the very best of [facility].    Sincerely,    Christine Yates CMA - CMT/CA  [facility]  [address]   [place] [address]  [phone]

## 2021-06-21 NOTE — LETTER
Letter by Anupam Rodney MD at      Author: Anupam Rodney MD Service: -- Author Type: --    Filed:  Encounter Date: 4/26/2021 Status: (Other)         Nancy Hughes  [address]  [place] [address]             April 26, 2021         Dear Ms. Hughes,    Below are the results from your recent visit:    Resulted Orders   Lipid Profile   Result Value Ref Range    Triglycerides 55 <=149 mg/dL    Cholesterol 161 <=199 mg/dL    LDL Calculated 81 <=129 mg/dL    HDL Cholesterol 69 >=50 mg/dL    Patient Fasting > 8hrs? Yes        The 10-year ASCVD risk score (Natalie DC Jr., et al., 2013) is: 31.3%   Continue current management     Please call with questions or contact us using [employer].    Sincerely,        Electronically signed by Anupam Rodney MD

## 2021-06-21 NOTE — PROGRESS NOTES
TCM DISCHARGE FOLLOW UP CALL    Discharge Date:  11/8/2018  Reason for hospital stay (discharge diagnosis)::  Oral Cellulitis, Angioedema, Hyponatremia, Diabetic Neuropathy  Are you feeling better, the same or worse since your discharge?:  Patient is feeling better  Do you feel like you have a plan in the event of a health emergency?: Yes (Call clinic.  RN informed pt of 24/7 nurse triage)    As part of your discharge plan, were  home care services ordered for you?: No    Did you receive any new medications, or was there a change to your medications?: Yes    Are you taking those medications, or do you have any established regiment?:  RN reviewed new/changed d/c medications w/pt from d/c paperwork.  Pt states she is taking medications as prescribed, and confirmed having stopped lisinopril, as instructed.  Do you have any follow up visits scheduled with your PCP or Specialist?:  Yes, with PCP (Dr. Marta Hummel 11/19/18)  (RN) Is PCP appt scheduled soon enough (within 14 days of discharge date)?: Yes         Kamilah Escalera RN Care Manager, Population Health

## 2021-06-21 NOTE — LETTER
"Letter by Anupam Rodney MD at      Author: Anupam Rodney MD Service: -- Author Type: --    Filed:  Encounter Date: 3/22/2021 Status: (Other)         Nancy Hughes  [address]  [facility] [address]             March 22, 2021         Dear Ms. Gasnow,    Below are the results from your recent visit:    Resulted Orders   Glycosylated Hemoglobin A1c   Result Value Ref Range    Hemoglobin A1c 9.6 (H) <=5.6 %   Basic Metabolic Panel   Result Value Ref Range    Sodium 135 (L) 136 - 145 mmol/L    Potassium 5.2 (H) 3.5 - 5.0 mmol/L    Chloride 96 (L) 98 - 107 mmol/L    CO2 26 22 - 31 mmol/L    Anion Gap, Calculation 13 5 - 18 mmol/L    Glucose 269 (H) 70 - 125 mg/dL    Calcium 10.1 8.5 - 10.5 mg/dL    BUN 14 8 - 28 mg/dL    Creatinine 0.77 0.60 - 1.10 mg/dL    GFR MDRD Af Amer >60 >60 mL/min/1.73m2    GFR MDRD Non Af Amer >60 >60 mL/min/1.73m2    Narrative    Fasting Glucose reference range is 70-99 mg/dL per  American Diabetes Association (ADA) guidelines.   Microalbumin, Random Urine   Result Value Ref Range    Microalbumin, Random Urine <0.50 0.00 - 1.99 mg/dL    Creatinine, Urine 14.1 mg/dL    Microalbumin/Creatinine Ratio Random Urine        Comment:      \"Unable to calculate: Creatinine and/or Microalbumin value below detectable level\"    Narrative    Microalbumin, Random Urine  <2.0 mg/dL . . . . . . . . Normal  3.0-30.0 mg/dL . . . . . . Microalbuminuria  >30.0 mg/dL . . . . . .  . Clinical Proteinuria    Microalbumin/Creatinine Ratio, Random Urine  <20 mg/g . . . . .. . . . Normal   mg/g . . . . . . . Microalbuminuria  >300 mg/g . . . . . . . . Clinical Proteinuria       Lipid Profile   Result Value Ref Range    Triglycerides 74 <=149 mg/dL    Cholesterol 190 <=199 mg/dL    LDL Calculated 100 <=129 mg/dL    HDL Cholesterol 75 >=50 mg/dL    Patient Fasting > 8hrs? No    Magnesium   Result Value Ref Range    Magnesium 1.7 (L) 1.8 - 2.6 mg/dL       Low magnesium   Take 2 tabs daily "   New prescription sent to the pharmacy     Mildly elevated potassium but stable in comparison   Elevated glucose (known diabetic), A1c of 9.6 elevated in comparison to 5 months ago     Please call with questions or contact us using [employer].    Sincerely,        Electronically signed by Anupam Rodney MD

## 2021-06-24 ENCOUNTER — RECORDS - HEALTHEAST (OUTPATIENT)
Dept: ADMINISTRATIVE | Facility: OTHER | Age: 71
End: 2021-06-24

## 2021-06-24 LAB — RETINOPATHY: POSITIVE

## 2021-06-24 NOTE — PROGRESS NOTES
"[facility]  ENDOCRINOLOGY    Diabetes Note 3/14/2019    Nancy Hughes, 1950, [id number]          Reason for visit      1. Insulin dependent diabetes mellitus (H)        HPI     Nancy Hughes is a very pleasant 68 y.o. old female who presents for follow up.  SUMMARY:  Nancy is here today in f/u for DM 2. She has had an eventful last 6 months. In November, she went to ER with facial edema and leg cramping that was determined to be neuropathy. It was felt that the edema was being caused by the Lisinopril that she was taking.  She had been using her Victoza sub-optimally to save the medication from running out as quickly (it is expensive) and using less Lantus than she probably should have.  Post Hospital visit, in discussion with her PCP, Dr Lozano, it was decided that maybe she ought to make more of an effort, especially given the terrible pain that she was having with the neuropathy.  She increased the Victoza to the optimum dosage, increased her Lantus a bit, and was put on Gabapentin. She is also taking Metformin.  Her latest A1c is 7.5, and far improved from her last at 9.5.  She is feeling better.  She is having some hypoglycemia, per her testimony and her glucometer download. This occurs often in the morning.  She reports that she has been having a \"glob\" of peanut butter at bedtime, and is still dropping. To take care of this, she has been having some juice in the middle of the night.  She takes her Lantus in the morning, and it shouldn't be the causative agent at this point. She does note that when she has more CHO at her evening meal, she doesn't seem to have the hypoglycemia.       Blood glucose data:  Ave: 78    Past Medical History     Patient Active Problem List   Diagnosis     Hyperlipemia     Benign Essential Hypertension     Osteopenia     Insulin dependent diabetes mellitus (H)     Disorder of bone and cartilage, unspecified     Overweight     Diabetic retinopathy (H)     Upper respiratory " "infection     Oral cellulitis     Angioedema, initial encounter     Hyponatremia     Diabetic neuropathy (H)        Family History       family history includes Breast cancer in her paternal aunt; Breast cancer (age of onset: 81) in her mother; Colon cancer (age of onset: 64) in her brother.    Social History      reports that she has quit smoking. Her smoking use included cigarettes. she has never used smokeless tobacco. She reports that she does not drink alcohol or use drugs.      Review of Systems     Patient has no polyuria or polydipsia, no chest pain, dyspnea or TIA's, no numbness, tingling or pain in extremities  Remainder negative except as noted in HPI.    Vital Signs     /76   Ht 5' 5\" (1.651 m)   Wt 169 lb 1.6 oz (76.7 kg)   BMI 28.14 kg/m    Wt Readings from Last 3 Encounters:   03/13/19 169 lb 1.6 oz (76.7 kg)   11/19/18 162 lb (73.5 kg)   11/07/18 160 lb 1.6 oz (72.6 kg)       Physical Exam     Constitutional:  Well developed, Well nourished  HENT:  Normocephalic,   Neck: Thyroid normal, No lymph nodes, Supple  Eyes:  PERRL, Conjunctiva pink  Respiratory:  Normal breath sounds, No respiratory distress  Cardiovascular:  Normal heart rate, Normal rhythm, No murmurs  GI:  Bowel sounds normal, Soft, No tenderness  Musculoskeletal:  No gross deformity or lesions, normal dorsalis pedis pulses  Skin: No acanthosis nigricans, lipoatrophy or lipodystrophy  Neurologic:  Alert & oriented x 3, nonfocal  Psychiatric:  Affect, Mood, Insight appropriate  Diabetic foot exam: no ulcers, charcot's or high risk calluses, Normal monofilament exam          Assessment     1. Insulin dependent diabetes mellitus (H)        Plan     Nancy is much improved, and stable with current medications.  She would like to return to Clinic as needed, and I think that this is a reasonable request. Discussed increasing Gabapentin as needed, which is something that Dr Lozano had already told her.  She will f/u with me as needed. " "Time spent with pt today: 25 min with >50% spent in counseling and coordination of care.      [place] AKIN [facility]  3/14/2019  6:55 AM        Lab Results     Hemoglobin A1c   Date Value Ref Range Status   03/06/2019 7.5 (H) 3.5 - 6.0 % Final   11/19/2018 9.5 (H) 4.2 - 6.1 % Final   08/15/2018 8.1 (H) 4.2 - 6.1 % Final   04/17/2018 9.2 (H) 3.5 - 6.0 % Final   01/04/2018 8.9 (H) 3.5 - 6.0 % Final     Creatinine   Date Value Ref Range Status   03/06/2019 0.67 0.60 - 1.10 mg/dL Final   11/19/2018 0.71 0.60 - 1.10 mg/dL Final   11/08/2018 0.66 0.60 - 1.10 mg/dL Final     Microalbumin, Random Urine   Date Value Ref Range Status   08/15/2018 <0.50 0.00 - 1.99 mg/dL Final       Cholesterol   Date Value Ref Range Status   06/14/2017 178 <=199 mg/dL Final     HDL Cholesterol   Date Value Ref Range Status   06/14/2017 66 >=50 mg/dL Final     LDL Calculated   Date Value Ref Range Status   06/14/2017 103 <=129 mg/dL Final     Triglycerides   Date Value Ref Range Status   06/14/2017 46 <=149 mg/dL Final       Lab Results   Component Value Date    ALT 21 11/07/2018    AST 27 11/07/2018    ALKPHOS 95 11/07/2018    BILITOT 0.6 11/07/2018         Current Medications     Outpatient Medications Prior to Visit   Medication Sig Dispense Refill     amLODIPine (NORVASC) 5 MG tablet Take 1 tablet (5 mg total) by mouth daily. 90 tablet 3     aspirin 81 mg TbEF Take 1 tablet by mouth daily.       aspirin-acetaminophen-caffeine (EXCEDRIN MIGRAINE) 250-250-65 mg per tablet Take 1 tablet by mouth every 6 (six) hours as needed for pain.       BD ULTRA-FINE SHERRY PEN NEEDLES 32 gauge x 5/32\" Ndle USE TWICE A DAY WITH INSULIN  each 1     blood glucose test strips Use 1 each As Directed as needed. Dispense brand per patient's insurance at pharmacy discretion. 300 strip 1     calcium citrate-vitamin D (CALCIUM CITRATE +) 315-200 mg-unit per tablet Take 1 tablet by mouth 2 (two) times a day.       DOCUSATE CALCIUM (STOOL SOFTENER " "ORAL) Take 100 mg by mouth daily.        gabapentin (NEURONTIN) 300 MG capsule Take 1 capsule (300 mg total) by mouth at bedtime. 30 capsule 1     lancets 30 gauge Misc Use 1 each As Directed 3 (three) times a day. Use one lancet with your Verio meter to check blood sugar as directed 300 each 3     liraglutide (VICTOZA) 0.6 mg/0.1 mL (18 mg/3 mL) PnIj injection Inject 1.8 mg under the skin daily With or without food.. 6 mL 11     magnesium oxide 250 mg Tab Take 250 mg by mouth daily.       metFORMIN (GLUCOPHAGE) 1000 MG tablet Take 1 tablet (1,000 mg total) by mouth 2 (two) times a day with meals. 180 tablet 3     MULTIVITAMIN (MULTIPLE VITAMIN ESSENTIAL ORAL) Take 1 tablet by mouth daily.        pen needle, diabetic (BD ULTRA-FINE SHERRY PEN NEEDLE) 32 gauge x 5/32\" Ndle Use daily with insulin 300 each 3     psyllium with dextrose (FIBER) powder Take by mouth 3 (three) times a day. 3 scoops every morning       insulin glargine (LANTUS; BASAGLAR) 100 unit/mL (3 mL) pen Inject 22 Units under the skin every morning. (Patient taking differently: Inject 25 Units under the skin every morning.       ) 10 adj dose pen 0     No facility-administered medications prior to visit.            "

## 2021-06-24 NOTE — TELEPHONE ENCOUNTER
Who is calling:   patient  Reason for Call:   Patient needs to have a 90 day supply  Date of last appointment with primary care:    Okay to leave a detailed message: Yes

## 2021-06-26 NOTE — PROGRESS NOTES
"Progress Notes by Zeny Cohen CNP at 11/7/2018  2:10 PM     Author: Zeny Cohen CNP Service: -- Author Type: Nurse Practitioner    Filed: 11/7/2018  3:12 PM Encounter Date: 11/7/2018 Status: Signed    : Zeny Cohen CNP (Nurse Practitioner)       Subjective:      Patient ID: Nancy Hughes is a 68 y.o. female.    Chief Complaint:   Chief Complaint   Patient presents with   ? severe nausea and vomiting and loose stools     has had all day now tongue is starting to swell and she is not feeling well at all. Is also having severe thigh pain bilaterally.  States is diabetic and took insulin this morning as usual but has not been able to keep anything down.         HPI : N/V with soft BMs starting this morning with right sided tongue swelling starting 2 hours ago.     Bilateral thigh cramping comes and goes, worse with lying down. On lisinopril \"forever.\"  Has never had tongue swelling.  No new foods other Powerade yesterday.    Vomited x 5 today.     Blood sugar this morning was 75 this morning and couldn't eat today.  Took insulin and Victoza.        No past medical history on file.    Past Surgical History:   Procedure Laterality Date   ? BREAST CYST ASPIRATION     ? BREAST CYST ASPIRATION Right 1980       Family History   Problem Relation Age of Onset   ? Breast cancer Mother 81   ? Colon cancer Brother 64   ? Breast cancer Paternal Aunt        Social History   Substance Use Topics   ? Smoking status: Former Smoker     Types: Cigarettes   ? Smokeless tobacco: Never Used   ? Alcohol use No       Review of Systems   Constitutional: Positive for appetite change and fatigue. Negative for chills and fever.   Respiratory: Negative for shortness of breath, wheezing and stridor.    Gastrointestinal: Positive for diarrhea, nausea and vomiting.   Skin: Negative for rash.   Allergic/Immunologic: Negative for food allergies.       Objective:     BP (!) 166/104 (Patient Site: Left Arm, Patient Position: " Sitting, Cuff Size: Adult Regular)  Pulse (!) 106  Temp 98.6  F (37  C) (Oral)   Resp 20  Wt 164 lb 1.6 oz (74.4 kg)  SpO2 98%  BMI 27.73 kg/m2    Physical Exam   Constitutional: She is oriented to person, place, and time. She appears well-developed and well-nourished.   HENT:   Mouth/Throat: No oral lesions. No uvula swelling. No posterior oropharyngeal edema.       Marked swelling   Eyes: Conjunctivae are normal.   Cardiovascular: Intact distal pulses.    Pulmonary/Chest: Effort normal.   Musculoskeletal:   Normal gait    Neurological: She is alert and oriented to person, place, and time.   Skin: Skin is warm and dry. There is pallor.   Psychiatric: She has a normal mood and affect. Her behavior is normal. Judgment and thought content normal.       Assessment:     Procedures    The primary encounter diagnosis was Tongue swelling. Diagnoses of Vomiting and diarrhea, Insulin dependent diabetes mellitus (H), and Leg cramping were also pertinent to this visit.    Plan:     Diagnoses and all orders for this visit:    Tongue swelling    Vomiting and diarrhea  -     ondansetron disintegrating tablet 4 mg (ZOFRAN-ODT); Take 1 tablet (4 mg total) by mouth once.    Insulin dependent diabetes mellitus (H)    Leg cramping        Patient does not have her glucometer and we did not have a glucometer to check her blood sugar here without going to the lab and having it drawn in the lab.  Patient's tongue is extremely swollen on the right side and she is having difficulty speaking.  Therefore, her airway is at risk and lisinopril related swelling needs to be managed in the hospital. She does take lisinopril.      Unclear how tongue swelling, vomiting, diarrhea and leg cramping are related at this time if at all.  Certainly, patient could be severely hypokalemic.    Explained to  and patient that patient needs to be seen immediately in the emergency room to manage tongue swelling and symptoms.  Patient does not have  any difficulty swallowing spit and no sensation of throat swelling, so do not believe ambulance or EpiPen use in the urgent care is necessary.  Advised provider to alert triage nurse that patient will need a blood sugar checked when she arrives as she is at risk for being hypoglycemic at this time.  This case with Dr. Hdez in the [facility] emergency department    Hospital drive patient across a parking lot to [facility] emergency department further management.

## 2021-06-29 ENCOUNTER — RECORDS - HEALTHEAST (OUTPATIENT)
Dept: HEALTH INFORMATION MANAGEMENT | Facility: CLINIC | Age: 71
End: 2021-06-29

## 2021-07-03 NOTE — ADDENDUM NOTE
Addendum Note by Aaron Harper DO at 12/5/2018  8:47 AM     Author: Aaron Harper DO Service: -- Author Type: Physician    Filed: 12/5/2018  8:47 AM Encounter Date: 12/3/2018 Status: Signed    : Aaron Harper DO (Physician)    Addended by: AARON HARPER on: 12/5/2018 08:47 AM        Modules accepted: Orders

## 2021-07-03 NOTE — ADDENDUM NOTE
Addendum Note by Aaron Harper DO at 6/19/2019  8:00 AM     Author: Aaron Harper DO Service: -- Author Type: Physician    Filed: 6/19/2019 12:27 PM Encounter Date: 6/19/2019 Status: Signed    : Aaron Harper DO (Physician)    Addended by: AARON HARPER on: 6/19/2019 12:27 PM        Modules accepted: Orders

## 2021-07-03 NOTE — ADDENDUM NOTE
Addendum Note by Elliot Flores CMA at 12/6/2017  2:49 PM     Author: Elliot Flores CMA Service: -- Author Type: Certified Medical Assistant    Filed: 12/6/2017  2:49 PM Encounter Date: 12/6/2017 Status: Signed    : Elliot Flores CMA (Certified Medical Assistant)    Addended by: ELLIOT FLORES on: 12/6/2017 02:49 PM        Modules accepted: Orders

## 2021-07-17 ENCOUNTER — HEALTH MAINTENANCE LETTER (OUTPATIENT)
Age: 71
End: 2021-07-17

## 2021-07-23 ENCOUNTER — ALLIED HEALTH/NURSE VISIT (OUTPATIENT)
Dept: EDUCATION SERVICES | Facility: CLINIC | Age: 71
End: 2021-07-23
Payer: COMMERCIAL

## 2021-07-23 DIAGNOSIS — E11.9 DIABETES MELLITUS (H): Primary | ICD-10-CM

## 2021-07-23 PROCEDURE — 95250 CONT GLUC MNTR PHYS/QHP EQP: CPT

## 2021-07-23 PROCEDURE — G0108 DIAB MANAGE TRN  PER INDIV: HCPCS | Mod: AE

## 2021-07-23 NOTE — PATIENT INSTRUCTIONS
Check to see if you have Tresiba at home and if it is  or not.   May want to look into different GLP-1 that we can possibly take at a higher dose and lower insulin for less risk of hypoglycemia and better control over highs. Pt to call insurance to see if trulicity or ozempic is covered.   Decrease lantus to 22 units.   Will follow up on  as scheduled.

## 2021-07-23 NOTE — LETTER
"    7/23/2021         RE: Nancy Hughes  [address]  [facility] [address]        Dear Colleague,    Thank you for referring your patient, Nancy Hughes, to the [facility]. Please see a copy of my visit note below.    Diabetes Self-Management Education & Support    Presents for: Individual review    SUBJECTIVE/OBJECTIVE:  Presents for: Individual review  Accompanied by: Spouse  Diabetes education in the past 24mo: No  Focus of Visit: Monitoring, Reducing Risks, CGM, Other  Type of CGM visit: Professional CGM  Diabetes type: Type 2  Date of diagnosis: 12 years ago  Disease course: Getting harder to manage  Transportation concerns: No  Cultural Influences/Ethnic Background:  Not  or       Diabetes Symptoms & Complications:     Complications assessed today?: Yes  Peripheral neuropathy: Yes  Retinopathy: Yes    Patient Problem List and Family Medical History reviewed for relevant medical history, current medical status, and diabetes risk factors.    Vitals:  There were no vitals taken for this visit.  Estimated body mass index is 28.83 kg/m  as calculated from the following:    Height as of 7/9/21: 1.645 m (5' 4.75\").    Weight as of 7/9/21: 78 kg (171 lb 14.4 oz).   Last 3 BP:   BP Readings from Last 3 Encounters:   07/09/21 128/76   03/15/21 (!) 152/84   10/02/20 138/80       History   Smoking Status     Former Smoker     Types: Cigarettes   Smokeless Tobacco     Never Used       Labs:  Lab Results   Component Value Date    A1C 9.6 03/15/2021     Lab Results   Component Value Date     03/15/2021     Lab Results   Component Value Date    LDL 81 04/26/2021     08/15/2018     Direct Measure HDL   Date Value Ref Range Status   04/26/2021 69 >=50 mg/dL Final   ]  GFR Estimate   Date Value Ref Range Status   03/15/2021 >60 >60 mL/min/1.73m2 Final     GFR Estimate If Black   Date Value Ref Range Status   03/15/2021 >60 >60 mL/min/1.73m2 Final     Lab Results "   Component Value Date    CR 0.77 03/15/2021     No results found for: MICROALBUMIN    Healthy Eating:  Healthy Eating Assessed Today: Yes (pt brings in food log. she is counting carbs, eating balanced meals. she is trying to stay around 30g/meal)  Meal planning/habits: Avoiding sweets, Carb counting, Keeps food records  Meals include: Breakfast, Dinner, Lunch, Evening Snack    Being Active:  Being Active Assessed Today: Yes    Monitoring:  Monitoring Assessed Today: Yes  Did patient bring glucose meter to appointment? : Yes  Times checking blood sugar at home (number): 1  Times checking blood sugar at home (per): Day  Blood glucose trend: Fluctuating    Most recent readings per meter:   Waking a.m.: 102, 53, 57, 174, 139, 48, 176, 50, 95, 61, 228, 106, 77, 50, 55, 90, 81, 114, 62, 121, 48, 79, 125, 103, 95, 113, 53, 65, 93, 106, 86, 66, 64, 89, 54, 63, 82, 101  4pm: 55  8pm: 51, 281, 310  1am: 139, 76, 84, 261, 172    Taking Medications:  Diabetes Medication(s)     Biguanides       metFORMIN (GLUCOPHAGE) 1000 MG tablet    [METFORMIN (GLUCOPHAGE) 1000 MG TABLET] Take 1 tablet (1,000 mg total) by mouth 2 (two) times a day with meals.    Insulin       insulin glargine (LANTUS SOLOSTAR U-100 INSULIN) 100 unit/mL (3 mL) pen    [INSULIN GLARGINE (LANTUS SOLOSTAR U-100 INSULIN) 100 UNIT/ML (3 ML) PEN] Inject 24 Units under the skin daily. Do not mix Lantus with any other insulin    Incretin Mimetic Agents (GLP-1 Receptor Agonists)       liraglutide (VICTOZA) 0.6 mg/0.1 mL (18 mg/3 mL) PnIj injection    [LIRAGLUTIDE (VICTOZA) 0.6 MG/0.1 ML (18 MG/3 ML) PNIJ INJECTION] Inject 1.8 mg under the skin daily for 90 doses. With or without food.          Taking Medication Assessed Today: Yes  Current Treatments: Insulin Injections, Oral Medication (taken by mouth), Non-insulin Injectables  Problems taking diabetes medications regularly?: No    Problem Solving:  Problem Solving Assessed Today: Yes  Is the patient at risk for  hypoglycemia?: Yes  Hypoglycemia Frequency: Daily  Hypoglycemia Treatment: Juice, Candy, Other food    Hypoglycemia symptoms  Dizziness or Light-Headedness: Yes  Hunger: Yes  Nervousness/Anxiety: Yes  Sweats: Yes  Feeling shaky: Yes    Hypoglycemia Complications  Blackouts: No  Hospitalization: No  Nocturnal hypoglycemia: Yes  Seizures: No    Reducing Risks:  Reducing Risks Assessed Today: Yes  Diabetes Risks: Age over 45 years  Has dilated eye exam at least once a year?: Yes    Healthy Coping:  Healthy Coping Assessed Today: Yes  Emotional response to diabetes: Ready to learn  Informal Support system:: Spouse, Family  Stage of change: PREPARATION (Decided to change - considering how)  Patient Activation Measure Survey Score:  No flowsheet data found.    Diabetes knowledge and skills assessment:   Patient is knowledgeable in diabetes management concepts related to: Healthy Eating, Being Active and Problem Solving    Patient needs further education on the following diabetes management concepts: Taking Medication and Problem Solving    Based on learning assessment above, most appropriate setting for further diabetes education would be: Individual setting.      INTERVENTIONS:    Sensor Type: Decision Rocketro  Serial #: [id number]  Expiration Date: 10/31/21  Indication(s) for CGM Study: Difficult to manage hypoglycemia and/or hyperglycemia    Education provided today on:  AADE Self-Care Behaviors:  Healthy Eating: carbohydrate counting and consistency in amount, composition, and timing of food intake  Monitoring: purpose and frequency of monitoring  Taking Medication: action of prescribed medication and when to take medications  Problem Solving: high blood glucose - causes, signs/symptoms, treatment and prevention, low blood glucose - causes, signs/symptoms, treatment and prevention and carrying a carbohydrate source at all times  Reducing Risks: major complications of diabetes and A1C - goals, relating to blood glucose  levels, how often to check  Healthy Coping: recognize feelings about diagnosis    WRITTEN AND VERBAL INFORMATION GIVEN TO SUPPORT UNDERSTANDING OF: LibrePro CGM: Sensor insertion, intention of monitoring for 14 days. Keep records of BG, food intake, exercise, and medication dosing during wear.     Opportunities for ongoing education and support in diabetes-self management were discussed.    Pt verbalized understanding of concepts discussed and recommendations provided today.       Education Materials Provided:  No new materials provided today    ASSESSMENT:    Biggest concern today is frequency of low BG. Pt reports after PCP visit on  she decreased her lantus to 20 units. She reports her BG were getting to high so she has slowly been increasing, back up to 25 units now. She has tried Tresiba in the past (2019) but does not think it was any better. She reports she still has it in her fridge and is going to check the expiration date. We may want to consider changing to Tresiba for less risk of nocturnal hypoglycemia. She is taking her lantus in the morning. Discussed lows are most concerning today. Pt states she has neuropathy and retinopathy and so she really worries if the BG are too high that it is going to make those worse. She has used novolog in the past as well but notes she felt like she was chasing blood sugars even more.   Will do a 14 day CGM to get better insight into patterns. Sensor placed on left arm, pt given franny 2 reader to take home.     Sensor was inserted with no resistance or bleeding at insertion site.  Pt verbalized understanding of concepts discussed and recommendations provided today.  Time spent in DSMT: 30 minutes   Time spent in CGM insertion: 30 minutes, in addition to time spent in DSMT      PLAN  See Patient Instructions for co-developed, patient-stated behavior change goals.  Check to see if you have Tresiba at home and if it is  or not.   May want to look into different  GLP-1 that we can possibly take at a higher dose and lower insulin for less risk of hypoglycemia and better control over highs. Pt to call insurance to see if trulicity or ozempic is covered.   Decrease lantus to 22 units.   Will follow up on 8/6 as scheduled.     Time Spent: 60 minutes  Encounter Type: Individual    Any diabetes medication dose changes were made via the CDE Protocol and Collaborative Practice Agreement with the patient's referring provider. A copy of this encounter was shared with the provider.

## 2021-07-23 NOTE — PROGRESS NOTES
"Diabetes Self-Management Education & Support    Presents for: Individual review    SUBJECTIVE/OBJECTIVE:  Presents for: Individual review  Accompanied by: Spouse  Diabetes education in the past 24mo: No  Focus of Visit: Monitoring, Reducing Risks, CGM, Other  Type of CGM visit: Professional CGM  Diabetes type: Type 2  Date of diagnosis: 12 years ago  Disease course: Getting harder to manage  Transportation concerns: No  Cultural Influences/Ethnic Background:  Not  or       Diabetes Symptoms & Complications:     Complications assessed today?: Yes  Peripheral neuropathy: Yes  Retinopathy: Yes    Patient Problem List and Family Medical History reviewed for relevant medical history, current medical status, and diabetes risk factors.    Vitals:  There were no vitals taken for this visit.  Estimated body mass index is 28.83 kg/m  as calculated from the following:    Height as of 7/9/21: 1.645 m (5' 4.75\").    Weight as of 7/9/21: 78 kg (171 lb 14.4 oz).   Last 3 BP:   BP Readings from Last 3 Encounters:   07/09/21 128/76   03/15/21 (!) 152/84   10/02/20 138/80       History   Smoking Status     Former Smoker     Types: Cigarettes   Smokeless Tobacco     Never Used       Labs:  Lab Results   Component Value Date    A1C 9.6 03/15/2021     Lab Results   Component Value Date     03/15/2021     Lab Results   Component Value Date    LDL 81 04/26/2021     08/15/2018     Direct Measure HDL   Date Value Ref Range Status   04/26/2021 69 >=50 mg/dL Final   ]  GFR Estimate   Date Value Ref Range Status   03/15/2021 >60 >60 mL/min/1.73m2 Final     GFR Estimate If Black   Date Value Ref Range Status   03/15/2021 >60 >60 mL/min/1.73m2 Final     Lab Results   Component Value Date    CR 0.77 03/15/2021     No results found for: MICROALBUMIN    Healthy Eating:  Healthy Eating Assessed Today: Yes (pt brings in food log. she is counting carbs, eating balanced meals. she is trying to stay around 30g/meal)  Meal " planning/habits: Avoiding sweets, Carb counting, Keeps food records  Meals include: Breakfast, Dinner, Lunch, Evening Snack    Being Active:  Being Active Assessed Today: Yes    Monitoring:  Monitoring Assessed Today: Yes  Did patient bring glucose meter to appointment? : Yes  Times checking blood sugar at home (number): 1  Times checking blood sugar at home (per): Day  Blood glucose trend: Fluctuating    Most recent readings per meter:   Waking a.m.: 102, 53, 57, 174, 139, 48, 176, 50, 95, 61, 228, 106, 77, 50, 55, 90, 81, 114, 62, 121, 48, 79, 125, 103, 95, 113, 53, 65, 93, 106, 86, 66, 64, 89, 54, 63, 82, 101  4pm: 55  8pm: 51, 281, 310  1am: 139, 76, 84, 261, 172    Taking Medications:  Diabetes Medication(s)     Biguanides       metFORMIN (GLUCOPHAGE) 1000 MG tablet    [METFORMIN (GLUCOPHAGE) 1000 MG TABLET] Take 1 tablet (1,000 mg total) by mouth 2 (two) times a day with meals.    Insulin       insulin glargine (LANTUS SOLOSTAR U-100 INSULIN) 100 unit/mL (3 mL) pen    [INSULIN GLARGINE (LANTUS SOLOSTAR U-100 INSULIN) 100 UNIT/ML (3 ML) PEN] Inject 24 Units under the skin daily. Do not mix Lantus with any other insulin    Incretin Mimetic Agents (GLP-1 Receptor Agonists)       liraglutide (VICTOZA) 0.6 mg/0.1 mL (18 mg/3 mL) PnIj injection    [LIRAGLUTIDE (VICTOZA) 0.6 MG/0.1 ML (18 MG/3 ML) PNIJ INJECTION] Inject 1.8 mg under the skin daily for 90 doses. With or without food.          Taking Medication Assessed Today: Yes  Current Treatments: Insulin Injections, Oral Medication (taken by mouth), Non-insulin Injectables  Problems taking diabetes medications regularly?: No    Problem Solving:  Problem Solving Assessed Today: Yes  Is the patient at risk for hypoglycemia?: Yes  Hypoglycemia Frequency: Daily  Hypoglycemia Treatment: Juice, Candy, Other food    Hypoglycemia symptoms  Dizziness or Light-Headedness: Yes  Hunger: Yes  Nervousness/Anxiety: Yes  Sweats: Yes  Feeling shaky: Yes    Hypoglycemia  Complications  Blackouts: No  Hospitalization: No  Nocturnal hypoglycemia: Yes  Seizures: No    Reducing Risks:  Reducing Risks Assessed Today: Yes  Diabetes Risks: Age over 45 years  Has dilated eye exam at least once a year?: Yes    Healthy Coping:  Healthy Coping Assessed Today: Yes  Emotional response to diabetes: Ready to learn  Informal Support system:: Spouse, Family  Stage of change: PREPARATION (Decided to change - considering how)  Patient Activation Measure Survey Score:  No flowsheet data found.    Diabetes knowledge and skills assessment:   Patient is knowledgeable in diabetes management concepts related to: Healthy Eating, Being Active and Problem Solving    Patient needs further education on the following diabetes management concepts: Taking Medication and Problem Solving    Based on learning assessment above, most appropriate setting for further diabetes education would be: Individual setting.      INTERVENTIONS:    Sensor Type: LibrePro  Serial #: [id number]  Expiration Date: 10/31/21  Indication(s) for CGM Study: Difficult to manage hypoglycemia and/or hyperglycemia    Education provided today on:  AADE Self-Care Behaviors:  Healthy Eating: carbohydrate counting and consistency in amount, composition, and timing of food intake  Monitoring: purpose and frequency of monitoring  Taking Medication: action of prescribed medication and when to take medications  Problem Solving: high blood glucose - causes, signs/symptoms, treatment and prevention, low blood glucose - causes, signs/symptoms, treatment and prevention and carrying a carbohydrate source at all times  Reducing Risks: major complications of diabetes and A1C - goals, relating to blood glucose levels, how often to check  Healthy Coping: recognize feelings about diagnosis    WRITTEN AND VERBAL INFORMATION GIVEN TO SUPPORT UNDERSTANDING OF: LibrePro CGM: Sensor insertion, intention of monitoring for 14 days. Keep records of BG, food intake,  exercise, and medication dosing during wear.     Opportunities for ongoing education and support in diabetes-self management were discussed.    Pt verbalized understanding of concepts discussed and recommendations provided today.       Education Materials Provided:  No new materials provided today    ASSESSMENT:    Biggest concern today is frequency of low BG. Pt reports after PCP visit on  she decreased her lantus to 20 units. She reports her BG were getting to high so she has slowly been increasing, back up to 25 units now. She has tried Tresiba in the past (2019) but does not think it was any better. She reports she still has it in her fridge and is going to check the expiration date. We may want to consider changing to Tresiba for less risk of nocturnal hypoglycemia. She is taking her lantus in the morning. Discussed lows are most concerning today. Pt states she has neuropathy and retinopathy and so she really worries if the BG are too high that it is going to make those worse. She has used novolog in the past as well but notes she felt like she was chasing blood sugars even more.   Will do a 14 day CGM to get better insight into patterns. Sensor placed on left arm, pt given franny 2 reader to take home.     Sensor was inserted with no resistance or bleeding at insertion site.  Pt verbalized understanding of concepts discussed and recommendations provided today.  Time spent in DSMT: 30 minutes   Time spent in CGM insertion: 30 minutes, in addition to time spent in DSMT      PLAN  See Patient Instructions for co-developed, patient-stated behavior change goals.  Check to see if you have Tresiba at home and if it is  or not.   May want to look into different GLP-1 that we can possibly take at a higher dose and lower insulin for less risk of hypoglycemia and better control over highs. Pt to call insurance to see if trulicity or ozempic is covered.   Decrease lantus to 22 units.   Will follow up on  as  scheduled.     Time Spent: 60 minutes  Encounter Type: Individual    Any diabetes medication dose changes were made via the CDE Protocol and Collaborative Practice Agreement with the patient's referring provider. A copy of this encounter was shared with the provider.

## 2021-07-26 DIAGNOSIS — I10 ESSENTIAL HYPERTENSION, BENIGN: Primary | ICD-10-CM

## 2021-07-26 RX ORDER — AMLODIPINE BESYLATE 5 MG/1
5 TABLET ORAL DAILY
Qty: 90 TABLET | Refills: 1 | OUTPATIENT
Start: 2021-07-26

## 2021-07-26 RX ORDER — AMLODIPINE BESYLATE 5 MG/1
5 TABLET ORAL DAILY
Qty: 30 TABLET | Refills: 0 | Status: SHIPPED | OUTPATIENT
Start: 2021-07-26 | End: 2021-08-16

## 2021-07-26 NOTE — TELEPHONE ENCOUNTER
Last visit: 7/9/2021-    Last filled:   amLODIPine (NORVASC) 5 MG tablet 90 tablet 1 3/8/2021  No   Sig - Route: [AMLODIPINE (NORVASC) 5 MG TABLET] Take 1 tablet (5 mg total) by mouth daily. - Oral   Class: Normal   Order: [id number]     Next visit: 8/16/2021

## 2021-07-26 NOTE — TELEPHONE ENCOUNTER
Reason for Call: Medication refill     Do you use a [facility]?  Name of the pharmacy and phone number for the current request:  [facility] - [address]  [phone]    Name of the medication requested: amLODIPine (NORVASC) 5 MG tablet    Other request: NONE    Can we leave a detailed message on this number? YES    Phone number patient can be reached at: Home number on file [phone] (home)    Best Time: ANY    Call taken on 7/26/2021 at 9:04 AM by JULIO Smith

## 2021-07-27 ENCOUNTER — TELEPHONE (OUTPATIENT)
Dept: EDUCATION SERVICES | Facility: CLINIC | Age: 71
End: 2021-07-27

## 2021-07-27 NOTE — TELEPHONE ENCOUNTER
Patient called. She is calling to check in with Lauren. She saw Lauren on Friday and was told to call in today.     Nancy @ [phone]

## 2021-07-27 NOTE — TELEPHONE ENCOUNTER
Spoke w/ pt. She states her Tresiba was not  so she switched to that. She notes she is still having lows so she just decreased to 21 units this morning. Advised pt if she has another low to decrease to 20 units.   She is not sure if trulicity or ozempic is covered she is going to call her insurance, she believes they are T3 drugs.   Pt notes she is enjoying using the 14 day franny.   Pt will call back w/ any other questions/concerns or updates. Otherwise will f/u  as planned.

## 2021-07-29 ENCOUNTER — TELEPHONE (OUTPATIENT)
Dept: EDUCATION SERVICES | Facility: CLINIC | Age: 71
End: 2021-07-29

## 2021-07-29 DIAGNOSIS — Z79.4 TYPE 2 DIABETES MELLITUS WITHOUT COMPLICATION, WITH LONG-TERM CURRENT USE OF INSULIN (H): Primary | ICD-10-CM

## 2021-07-29 DIAGNOSIS — E11.9 TYPE 2 DIABETES MELLITUS WITHOUT COMPLICATION, WITH LONG-TERM CURRENT USE OF INSULIN (H): Primary | ICD-10-CM

## 2021-07-29 RX ORDER — DULAGLUTIDE 0.75 MG/.5ML
0.75 INJECTION, SOLUTION SUBCUTANEOUS
Qty: 6 ML | Refills: 0 | Status: SHIPPED | OUTPATIENT
Start: 2021-07-29 | End: 2021-10-15

## 2021-07-29 NOTE — TELEPHONE ENCOUNTER
Spoke w/ pt. She is still struggling w/ lows. She took 20 units of tresiba yesterday. She has not taken yet today. She wonders if she should switch back to lantus. We discussed at length. Will decrease tresiba to 18 units. If she is still having lows over the weekend will decrease further to 16 units.     Pt notes she spoke w/ insurance and trulicity will need a PA.

## 2021-07-29 NOTE — TELEPHONE ENCOUNTER
Dr. COMER,     Would like to change from victoza to trulicity. Please sign Rx for trulicity. Please then route back to myself so we can start a PA.   Or let me know if you do not agree w/ trulicity Rx.     Thanks!  Lauren

## 2021-07-29 NOTE — TELEPHONE ENCOUNTER
Central Prior Authorization Team   Phone: [phone]    PA Initiation    Medication: Trulicity 0.75g/0.5ml   Insurance Company: [employer] - Phone [phone] Fax [phone]  Pharmacy Filling the Rx: [facility] - [address]  Filling Pharmacy Phone: [phone]  Filling Pharmacy Fax:    Start Date: 7/29/2021    Faxed manual form to [employer] [phone] sent as an urgent request.

## 2021-07-29 NOTE — TELEPHONE ENCOUNTER
Patient called. She is still having trouble with the Tresiba. Please call her to further discuss.    Nancy @ [phone]

## 2021-07-30 NOTE — TELEPHONE ENCOUNTER
Prior Authorization Not Needed per Insurance    Medication: Trulicity 0.75g/0.5ml no PA needed  Insurance Company: [employer] - Phone [phone] Fax [phone]  Expected CoPay:      Pharmacy Filling the Rx: [facility] - [place], [place] - [address] [place] [address]  Pharmacy Notified: Yes  Patient Notified: Yes    Pharmacy was called and they have a paid claim for this medication .

## 2021-08-04 NOTE — TELEPHONE ENCOUNTER
Ins should have contacted pharmacy and med should be ready for  and our billing number is [phone] they can answer pts billing /ins questions   See attached discharge instruction and activity sheet. NO PUSHING, PULLING, SHOVING, SHOVELING OR LIFTING ANYTHING OVER 5 LBS FOR 1 WEEK.   NO DRIVING FOR 2-3 DAYS

## 2021-08-06 ENCOUNTER — ALLIED HEALTH/NURSE VISIT (OUTPATIENT)
Dept: EDUCATION SERVICES | Facility: CLINIC | Age: 71
End: 2021-08-06
Payer: COMMERCIAL

## 2021-08-06 DIAGNOSIS — E11.9 DIABETES MELLITUS, TYPE 2 (H): Primary | ICD-10-CM

## 2021-08-06 PROCEDURE — 95250 CONT GLUC MNTR PHYS/QHP EQP: CPT

## 2021-08-06 PROCEDURE — G0108 DIAB MANAGE TRN  PER INDIV: HCPCS | Mod: AE

## 2021-08-06 NOTE — PROGRESS NOTES
"Diabetes Self-Management Education & Support    Presents for: CGM Review    SUBJECTIVE/OBJECTIVE:  Presents for: CGM Review  Accompanied by: Spouse  Diabetes education in the past 24mo: No  Focus of Visit: CGM  Type of CGM visit: Professional CGM  Diabetes type: Type 2  Disease course: Getting harder to manage  Diabetes management related comments/concerns: Highs and lows  Cultural Influences/Ethnic Background:  Not  or       Diabetes Symptoms & Complications:  Fatigue: Sometimes  Neuropathy: No  Polydipsia: Sometimes  Polyphagia: No  Polyuria: No  Visual change: No  Slow healing wounds: No  Autonomic neuropathy: No  CVA: No  Heart disease: No  Nephropathy: No  Peripheral neuropathy: No  Peripheral Vascular Disease: No  Retinopathy: Yes  Sexual dysfunction: Other    Patient Problem List and Family Medical History reviewed for relevant medical history, current medical status, and diabetes risk factors.    Vitals:  There were no vitals taken for this visit.  Estimated body mass index is 28.83 kg/m  as calculated from the following:    Height as of 7/9/21: 1.645 m (5' 4.75\").    Weight as of 7/9/21: 78 kg (171 lb 14.4 oz).   Last 3 BP:   BP Readings from Last 3 Encounters:   07/09/21 128/76   03/15/21 (!) 152/84   10/02/20 138/80       History   Smoking Status     Former Smoker     Types: Cigarettes   Smokeless Tobacco     Never Used       Labs:  Lab Results   Component Value Date    A1C 9.6 03/15/2021     Lab Results   Component Value Date     03/15/2021     Lab Results   Component Value Date    LDL 81 04/26/2021     08/15/2018     Direct Measure HDL   Date Value Ref Range Status   04/26/2021 69 >=50 mg/dL Final   ]  GFR Estimate   Date Value Ref Range Status   03/15/2021 >60 >60 mL/min/1.73m2 Final     GFR Estimate If Black   Date Value Ref Range Status   03/15/2021 >60 >60 mL/min/1.73m2 Final     Lab Results   Component Value Date    CR 0.77 03/15/2021     No results found for: " MICROALBUMIN    Healthy Eating:  Cultural/Samaritan diet restrictions?: No  Meal planning/habits: Avoiding sweets, Carb counting, Low carb, Keeps food records  How many times a week on average do you eat food made away from home (restaurant/take-out)?: 2  Meals include: Breakfast, Lunch, Dinner, Evening Snack  Beverages: Water, Coffee, Diet soda    Being Active:  Days per week of moderate to strenuous exercise (like a brisk walk): 2  On average, minutes per day of exercise at this level: 30  How intense was your typical exercise? : Light (like stretching or slow walking)  Exercise Minutes per Week: 60  Barrier to exercise: Physical limitation    Monitoring:  Blood Glucose Meter: Accu-chek  Times checking blood sugar at home (number): 2-4  Times checking blood sugar at home (per): Day  Blood glucose trend: Fluctuating      Taking Medications:  Taking: lantus 22 units per day, metformin 1g BID, discontinuing victoza and starting trulicty 0.75 mg/week   Current Treatments: Diet, Insulin Injections, Non-insulin Injectables, Oral Medication (taken by mouth)      Reducing Risks:  CAD Risks: Diabetes Mellitus, Dyslipidemia, Hypertension, Post-menopausal, Sedentary lifestyle  Has dilated eye exam at least once a year?: Yes  Sees dentist every 6 months?: No  Feet checked by healthcare provider in the last year?: Yes    Healthy Coping:  Informal Support system:: Children, Family, Neighbors, Spouse  Patient Activation Measure Survey Score:  No flowsheet data found.    Diabetes knowledge and skills assessment:   Patient is knowledgeable in diabetes management concepts related to: Healthy Eating, Being Active, Monitoring, Taking Medication, Problem Solving, Reducing Risks and Healthy Coping    Patient needs further education on the following diabetes management concepts: Monitoring and Reducing Risks    Based on learning assessment above, most appropriate setting for further diabetes education would be: Individual  setting.      INTERVENTIONS:    REPORTS:                Education provided today on:  AADE Self-Care Behaviors:  Healthy Eating: carbohydrate counting and portion control  Monitoring: individual blood glucose targets and frequency of monitoring  Taking Medication: action of prescribed medication, drawing up, administering and storing injectable diabetes medications, side effects of prescribed medications and when to take medications  Problem Solving: high blood glucose - causes, signs/symptoms, treatment and prevention, low blood glucose - causes, signs/symptoms, treatment and prevention and carrying a carbohydrate source at all times  Reducing Risks: major complications of diabetes and A1C - goals, relating to blood glucose levels, how often to check    Pt verbalized understanding of concepts discussed and recommendations provided today.         ASSESSMENT:  Pt seen today for CGM f/u. She notes she stopped the tresiba 2 nights ago and started the lantus instead at 22 units in the morning. Pt felt the tresiba was making her lows worse. She brings in trulicity today and was instructed on use. Pt was able to give trulicity injection w/o difficulty.      PLAN  See Patient Instructions for co-developed, patient-stated behavior change goals.  Pt will f/u on 9/10 as scheduled. Will call sooner w/ any concerns.       Time Spent: 60 minutes  Encounter Type: Individual    Any diabetes medication dose changes were made via the CDE Protocol and Collaborative Practice Agreement with the patient's referring provider. A copy of this encounter was shared with the provider.

## 2021-08-06 NOTE — PATIENT INSTRUCTIONS
Victoza discontinued and trulicity 0.75 mg/week started.   Continue with 22 units of lantus daily. If continued low BG overnight then decrease to 20 units.   Pt will f/u on 9/10 as scheduled. Will call sooner w/ any concerns.

## 2021-08-06 NOTE — LETTER
"    8/6/2021         RE: Nancy Hughes  [address]  [place] [address]        Dear Colleague,    Thank you for referring your patient, Nancy Hughes, to the [facility]. Please see a copy of my visit note below.    Diabetes Self-Management Education & Support    Presents for: CGM Review    SUBJECTIVE/OBJECTIVE:  Presents for: CGM Review  Accompanied by: Spouse  Diabetes education in the past 24mo: No  Focus of Visit: CGM  Type of CGM visit: Professional CGM  Diabetes type: Type 2  Disease course: Getting harder to manage  Diabetes management related comments/concerns: Highs and lows  Cultural Influences/Ethnic Background:  Not  or       Diabetes Symptoms & Complications:  Fatigue: Sometimes  Neuropathy: No  Polydipsia: Sometimes  Polyphagia: No  Polyuria: No  Visual change: No  Slow healing wounds: No  Autonomic neuropathy: No  CVA: No  Heart disease: No  Nephropathy: No  Peripheral neuropathy: No  Peripheral Vascular Disease: No  Retinopathy: Yes  Sexual dysfunction: Other    Patient Problem List and Family Medical History reviewed for relevant medical history, current medical status, and diabetes risk factors.    Vitals:  There were no vitals taken for this visit.  Estimated body mass index is 28.83 kg/m  as calculated from the following:    Height as of 7/9/21: 1.645 m (5' 4.75\").    Weight as of 7/9/21: 78 kg (171 lb 14.4 oz).   Last 3 BP:   BP Readings from Last 3 Encounters:   07/09/21 128/76   03/15/21 (!) 152/84   10/02/20 138/80       History   Smoking Status     Former Smoker     Types: Cigarettes   Smokeless Tobacco     Never Used       Labs:  Lab Results   Component Value Date    A1C 9.6 03/15/2021     Lab Results   Component Value Date     03/15/2021     Lab Results   Component Value Date    LDL 81 04/26/2021     08/15/2018     Direct Measure HDL   Date Value Ref Range Status   04/26/2021 69 >=50 mg/dL Final   ]  GFR Estimate   Date Value " Ref Range Status   03/15/2021 >60 >60 mL/min/1.73m2 Final     GFR Estimate If Black   Date Value Ref Range Status   03/15/2021 >60 >60 mL/min/1.73m2 Final     Lab Results   Component Value Date    CR 0.77 03/15/2021     No results found for: MICROALBUMIN    Healthy Eating:  Cultural/Sabianism diet restrictions?: No  Meal planning/habits: Avoiding sweets, Carb counting, Low carb, Keeps food records  How many times a week on average do you eat food made away from home (restaurant/take-out)?: 2  Meals include: Breakfast, Lunch, Dinner, Evening Snack  Beverages: Water, Coffee, Diet soda    Being Active:  Days per week of moderate to strenuous exercise (like a brisk walk): 2  On average, minutes per day of exercise at this level: 30  How intense was your typical exercise? : Light (like stretching or slow walking)  Exercise Minutes per Week: 60  Barrier to exercise: Physical limitation    Monitoring:  Blood Glucose Meter: Accu-chek  Times checking blood sugar at home (number): 2-4  Times checking blood sugar at home (per): Day  Blood glucose trend: Fluctuating      Taking Medications:  Taking: lantus 22 units per day, metformin 1g BID, discontinuing victoza and starting trulicty 0.75 mg/week   Current Treatments: Diet, Insulin Injections, Non-insulin Injectables, Oral Medication (taken by mouth)      Reducing Risks:  CAD Risks: Diabetes Mellitus, Dyslipidemia, Hypertension, Post-menopausal, Sedentary lifestyle  Has dilated eye exam at least once a year?: Yes  Sees dentist every 6 months?: No  Feet checked by healthcare provider in the last year?: Yes    Healthy Coping:  Informal Support system:: Children, Family, Neighbors, Spouse  Patient Activation Measure Survey Score:  No flowsheet data found.    Diabetes knowledge and skills assessment:   Patient is knowledgeable in diabetes management concepts related to: Healthy Eating, Being Active, Monitoring, Taking Medication, Problem Solving, Reducing Risks and Healthy  Coping    Patient needs further education on the following diabetes management concepts: Monitoring and Reducing Risks    Based on learning assessment above, most appropriate setting for further diabetes education would be: Individual setting.      INTERVENTIONS:    REPORTS:                Education provided today on:  AADE Self-Care Behaviors:  Healthy Eating: carbohydrate counting and portion control  Monitoring: individual blood glucose targets and frequency of monitoring  Taking Medication: action of prescribed medication, drawing up, administering and storing injectable diabetes medications, side effects of prescribed medications and when to take medications  Problem Solving: high blood glucose - causes, signs/symptoms, treatment and prevention, low blood glucose - causes, signs/symptoms, treatment and prevention and carrying a carbohydrate source at all times  Reducing Risks: major complications of diabetes and A1C - goals, relating to blood glucose levels, how often to check    Pt verbalized understanding of concepts discussed and recommendations provided today.         ASSESSMENT:  Pt seen today for CGM f/u. She notes she stopped the tresiba 2 nights ago and started the lantus instead at 22 units in the morning. Pt felt the tresiba was making her lows worse. She brings in trulicity today and was instructed on use. Pt was able to give trulicity injection w/o difficulty.      PLAN  See Patient Instructions for co-developed, patient-stated behavior change goals.  Pt will f/u on 9/10 as scheduled. Will call sooner w/ any concerns.       Time Spent: 60 minutes  Encounter Type: Individual    Any diabetes medication dose changes were made via the CDE Protocol and Collaborative Practice Agreement with the patient's referring provider. A copy of this encounter was shared with the provider.

## 2021-08-10 ENCOUNTER — ANCILLARY PROCEDURE (OUTPATIENT)
Dept: MAMMOGRAPHY | Facility: CLINIC | Age: 71
End: 2021-08-10
Attending: NURSE PRACTITIONER
Payer: COMMERCIAL

## 2021-08-10 DIAGNOSIS — N64.4 BREAST TENDERNESS IN FEMALE: ICD-10-CM

## 2021-08-10 PROCEDURE — 76642 ULTRASOUND BREAST LIMITED: CPT | Mod: RT

## 2021-08-10 PROCEDURE — 77062 BREAST TOMOSYNTHESIS BI: CPT

## 2021-08-11 ASSESSMENT — ACTIVITIES OF DAILY LIVING (ADL): CURRENT_FUNCTION: NO ASSISTANCE NEEDED

## 2021-08-16 ENCOUNTER — OFFICE VISIT (OUTPATIENT)
Dept: FAMILY MEDICINE | Facility: CLINIC | Age: 71
End: 2021-08-16
Payer: COMMERCIAL

## 2021-08-16 VITALS
RESPIRATION RATE: 16 BRPM | DIASTOLIC BLOOD PRESSURE: 80 MMHG | TEMPERATURE: 98.6 F | HEIGHT: 65 IN | BODY MASS INDEX: 28.47 KG/M2 | WEIGHT: 170.9 LBS | OXYGEN SATURATION: 97 % | SYSTOLIC BLOOD PRESSURE: 143 MMHG | HEART RATE: 67 BPM

## 2021-08-16 DIAGNOSIS — E87.5 SERUM POTASSIUM ELEVATED: ICD-10-CM

## 2021-08-16 DIAGNOSIS — M85.89 OSTEOPENIA OF MULTIPLE SITES: ICD-10-CM

## 2021-08-16 DIAGNOSIS — E11.40 TYPE 2 DIABETES MELLITUS WITH DIABETIC NEUROPATHY, WITH LONG-TERM CURRENT USE OF INSULIN (H): ICD-10-CM

## 2021-08-16 DIAGNOSIS — Z79.4 TYPE 2 DIABETES MELLITUS WITH DIABETIC NEUROPATHY, WITH LONG-TERM CURRENT USE OF INSULIN (H): ICD-10-CM

## 2021-08-16 DIAGNOSIS — I10 ESSENTIAL HYPERTENSION, BENIGN: ICD-10-CM

## 2021-08-16 DIAGNOSIS — Z00.00 ENCOUNTER FOR MEDICARE ANNUAL WELLNESS EXAM: Primary | ICD-10-CM

## 2021-08-16 LAB
ALBUMIN SERPL-MCNC: 4.1 G/DL (ref 3.5–5)
ALP SERPL-CCNC: 90 U/L (ref 45–120)
ALT SERPL W P-5'-P-CCNC: 21 U/L (ref 0–45)
ANION GAP SERPL CALCULATED.3IONS-SCNC: 10 MMOL/L (ref 5–18)
AST SERPL W P-5'-P-CCNC: 19 U/L (ref 0–40)
BILIRUB SERPL-MCNC: 0.4 MG/DL (ref 0–1)
BUN SERPL-MCNC: 11 MG/DL (ref 8–28)
CALCIUM SERPL-MCNC: 10.6 MG/DL (ref 8.5–10.5)
CHLORIDE BLD-SCNC: 98 MMOL/L (ref 98–107)
CO2 SERPL-SCNC: 29 MMOL/L (ref 22–31)
CREAT SERPL-MCNC: 0.78 MG/DL (ref 0.6–1.1)
GFR SERPL CREATININE-BSD FRML MDRD: 76 ML/MIN/1.73M2
GLUCOSE BLD-MCNC: 272 MG/DL (ref 70–125)
HBA1C MFR BLD: 8.6 % (ref 0–5.6)
POTASSIUM BLD-SCNC: 5.4 MMOL/L (ref 3.5–5)
PROT SERPL-MCNC: 6.5 G/DL (ref 6–8)
SODIUM SERPL-SCNC: 137 MMOL/L (ref 136–145)

## 2021-08-16 PROCEDURE — 80053 COMPREHEN METABOLIC PANEL: CPT | Performed by: FAMILY MEDICINE

## 2021-08-16 PROCEDURE — 99397 PER PM REEVAL EST PAT 65+ YR: CPT | Performed by: FAMILY MEDICINE

## 2021-08-16 PROCEDURE — 36415 COLL VENOUS BLD VENIPUNCTURE: CPT | Performed by: FAMILY MEDICINE

## 2021-08-16 PROCEDURE — 99214 OFFICE O/P EST MOD 30 MIN: CPT | Mod: 25 | Performed by: FAMILY MEDICINE

## 2021-08-16 PROCEDURE — 86803 HEPATITIS C AB TEST: CPT | Performed by: FAMILY MEDICINE

## 2021-08-16 PROCEDURE — 83036 HEMOGLOBIN GLYCOSYLATED A1C: CPT | Performed by: FAMILY MEDICINE

## 2021-08-16 RX ORDER — BLOOD SUGAR DIAGNOSTIC
1 STRIP MISCELLANEOUS
COMMUNITY
Start: 2019-09-18 | End: 2022-05-11 | Stop reason: ALTCHOICE

## 2021-08-16 RX ORDER — ALENDRONATE SODIUM 70 MG/1
70 TABLET ORAL
COMMUNITY
Start: 2020-09-22 | End: 2021-08-16

## 2021-08-16 RX ORDER — AMLODIPINE BESYLATE 5 MG/1
5 TABLET ORAL DAILY
Qty: 90 TABLET | Refills: 3 | Status: SHIPPED | OUTPATIENT
Start: 2021-08-16 | End: 2021-10-11

## 2021-08-16 RX ORDER — LIRAGLUTIDE 6 MG/ML
1.8 INJECTION SUBCUTANEOUS
COMMUNITY
Start: 2020-05-14 | End: 2021-08-16

## 2021-08-16 RX ORDER — ASPIRIN 81 MG/1
1 TABLET, CHEWABLE ORAL DAILY
COMMUNITY

## 2021-08-16 RX ORDER — GABAPENTIN 300 MG/1
300 CAPSULE ORAL
COMMUNITY
Start: 2020-10-18 | End: 2021-08-16

## 2021-08-16 ASSESSMENT — MIFFLIN-ST. JEOR: SCORE: 1282.11

## 2021-08-16 ASSESSMENT — ACTIVITIES OF DAILY LIVING (ADL): CURRENT_FUNCTION: NO ASSISTANCE NEEDED

## 2021-08-16 NOTE — PATIENT INSTRUCTIONS
Patient Education   Personalized Prevention Plan  You are due for the preventive services outlined below.  Your care team is available to assist you in scheduling these services.  If you have already completed any of these items, please share that information with your care team to update in your medical record.  Health Maintenance Due   Topic Date Due     ANNUAL REVIEW OF HM ORDERS  Never done     Hepatitis C Screening  Never done       Signs of Hearing Loss      Hearing much better with one ear can be a sign of hearing loss.   Hearing loss is a problem shared by many people. In fact, it is one of the most common health problems, particularly as people age. Most people age 65 and older have some hearing loss. By age 80, almost everyone does. Hearing loss often occurs slowly over the years. So you may not realize your hearing has gotten worse.  Have your hearing checked  Call your healthcare provider if you:    Have to strain to hear normal conversation    Have to watch other people s faces very carefully to follow what they re saying    Need to ask people to repeat what they ve said    Often misunderstand what people are saying    Turn the volume of the television or radio up so high that others complain    Feel that people are mumbling when they re talking to you    Find that the effort to hear leaves you feeling tired and irritated    Notice, when using the phone, that you hear better with one ear than the other  [employer] last reviewed this educational content on 1/1/2020 2000-2021 [employer]. All rights reserved. This information is not intended as a substitute for professional medical care. Always follow your healthcare professional's instructions.          Urinary Incontinence, Female (Adult)   Urinary incontinence means loss of bladder control. This problem affects many women, especially as they get older. If you have incontinence, you may be embarrassed to ask for help. But know that this  problem can be treated.   Types of Incontinence  There are different types of incontinence. Two of the main types are described here. You can have more than one type.     Stress incontinence. With this type, urine leaks when pressure (stress) is put on the bladder. This may happen when you cough, sneeze, or laugh. Stress incontinence most often occurs because the pelvic floor muscles that support the bladder and urethra are weak. This can happen after pregnancy and vaginal childbirth or a hysterectomy. It can also be due to excess body weight or hormone changes.    Urge incontinence (also called overactive bladder). With this type, a sudden urge to urinate is felt often. This may happen even though there may not be much urine in the bladder. The need to urinate often during the night is common. Urge incontinence most often occurs because of bladder spasms. This may be due to bladder irritation or infection. Damage to bladder nerves or pelvic muscles, constipation, and certain medicines can also lead to urge incontinence.  Treatment depends on the cause. Further evaluation is needed to find the type you have. This will likely include an exam and certain tests. Based on the results, you and your healthcare provider can then plan treatment. Until a diagnosis is made, the home care tips below can help ease symptoms.   Home care    Do pelvic floor muscle exercises, if they are prescribed. The pelvic floor muscles help support the bladder and urethra. Many women find that their symptoms improve when doing special exercises that strengthen these muscles. To do the exercises, contract the muscles you would use to stop your stream of urine. But do this when you re not urinating. Hold for 10 seconds, then relax. Repeat 10 to 20 times in a row, at least 3 times a day. Your healthcare provider may give you other instructions for how to do the exercises and how often.    Keep a bladder diary. This helps track how often and how  much you urinate over a set period of time. Bring this diary with you to your next visit with the provider. The information can help your provider learn more about your bladder problem.    Lose weight, if advised to by your provider. Extra weight puts pressure on the bladder. Your provider can help you create a weight-loss plan that s right for you. This may include exercising more and making certain diet changes.    Don't have foods and drinks that may irritate the bladder. These can include alcohol and caffeinated drinks.    Quit smoking. Smoking and other tobacco use can lead to a long-term (chronic) cough that strains the pelvic floor muscles. Smoking may also damage the bladder and urethra. Talk with your provider about treatments or methods you can use to quit smoking.    If drinking large amounts of fluid makes you have symptoms, you may be advised to limit your fluid intake. You may also be advised to drink most of your fluids during the day and to limit fluids at night.    If you re worried about urine leakage or accidents, you may wear absorbent pads to catch urine. Change the pads often. This helps reduce discomfort. It may also reduce the risk of skin or bladder infections.    Follow-up care  Follow up with your healthcare provider, or as directed. It may take some to find the right treatment for your problem. But healthy lifestyle changes can be made right away. These include such things as exercising on a regular basis, eating a healthy diet, losing weight (if needed), and quitting smoking. Your treatment plan may include special therapies or medicines. Certain procedures or surgery may also be options. Talk about any questions you have with your provider.   When to seek medical advice  Call the healthcare provider right away if any of these occur:    Fever of 100.4 F (38 C) or higher, or as directed by your provider    Bladder pain or fullness    Belly swelling    Nausea or vomiting    Back  pain    Weakness, dizziness, or fainting  [employer] last reviewed this educational content on 1/1/2020 2000-2021 [employer]. All rights reserved. This information is not intended as a substitute for professional medical care. Always follow your healthcare professional's instructions.

## 2021-08-16 NOTE — LETTER
August 24, 2021      Nancy Hughes  [address]   [place] [address]        Dear ,    We are writing to inform you of your test results.    Normal       Resulted Orders   Hepatitis C Screen Reflex to HCV RNA Quant and Genotype   Result Value Ref Range    Hepatitis C Antibody Nonreactive Nonreactive    Narrative    Assay performance characteristics have not been established for newborns, infants, and children.   Hemoglobin A1c   Result Value Ref Range    Hemoglobin A1C 8.6 (H) 0.0 - 5.6 %      Comment:      Normal <5.7%   Prediabetes 5.7-6.4%    Diabetes 6.5% or higher     Note: Adopted from ADA consensus guidelines.   Comprehensive metabolic panel   Result Value Ref Range    Sodium 137 136 - 145 mmol/L    Potassium 5.4 (H) 3.5 - 5.0 mmol/L    Chloride 98 98 - 107 mmol/L    Carbon Dioxide (CO2) 29 22 - 31 mmol/L    Anion Gap 10 5 - 18 mmol/L    Urea Nitrogen 11 8 - 28 mg/dL    Creatinine 0.78 0.60 - 1.10 mg/dL    Calcium 10.6 (H) 8.5 - 10.5 mg/dL    Glucose 272 (H) 70 - 125 mg/dL    Alkaline Phosphatase 90 45 - 120 U/L    AST 19 0 - 40 U/L    ALT 21 0 - 45 U/L    Protein Total 6.5 6.0 - 8.0 g/dL    Albumin 4.1 3.5 - 5.0 g/dL    Bilirubin Total 0.4 0.0 - 1.0 mg/dL    GFR Estimate 76 >60 mL/min/1.73m2      Comment:      As of July 11, 2021, eGFR is calculated by the CKD-EPI creatinine equation, without race adjustment. eGFR can be influenced by muscle mass, exercise, and diet. The reported eGFR is an estimation only and is only applicable if the renal function is stable.       If you have any questions or concerns, please call the clinic at the number listed above.       Sincerely,      Jus Rodney MD

## 2021-08-16 NOTE — LETTER
August 24, 2021      Nancy Hughes  [address]   [place] [address]        Dear ,    We are writing to inform you of your test results.    Improving A1c, in comparison from 5 months ago   A1c goal of 8.5 or less   Average daily blood sugar level of 150 or less   Continue current managements   Recheck level in 6 months       Resulted Orders   Hepatitis C Screen Reflex to HCV RNA Quant and Genotype   Result Value Ref Range    Hepatitis C Antibody Nonreactive Nonreactive    Narrative    Assay performance characteristics have not been established for newborns, infants, and children.   Hemoglobin A1c   Result Value Ref Range    Hemoglobin A1C 8.6 (H) 0.0 - 5.6 %      Comment:      Normal <5.7%   Prediabetes 5.7-6.4%    Diabetes 6.5% or higher     Note: Adopted from ADA consensus guidelines.   Comprehensive metabolic panel   Result Value Ref Range    Sodium 137 136 - 145 mmol/L    Potassium 5.4 (H) 3.5 - 5.0 mmol/L    Chloride 98 98 - 107 mmol/L    Carbon Dioxide (CO2) 29 22 - 31 mmol/L    Anion Gap 10 5 - 18 mmol/L    Urea Nitrogen 11 8 - 28 mg/dL    Creatinine 0.78 0.60 - 1.10 mg/dL    Calcium 10.6 (H) 8.5 - 10.5 mg/dL    Glucose 272 (H) 70 - 125 mg/dL    Alkaline Phosphatase 90 45 - 120 U/L    AST 19 0 - 40 U/L    ALT 21 0 - 45 U/L    Protein Total 6.5 6.0 - 8.0 g/dL    Albumin 4.1 3.5 - 5.0 g/dL    Bilirubin Total 0.4 0.0 - 1.0 mg/dL    GFR Estimate 76 >60 mL/min/1.73m2      Comment:      As of July 11, 2021, eGFR is calculated by the CKD-EPI creatinine equation, without race adjustment. eGFR can be influenced by muscle mass, exercise, and diet. The reported eGFR is an estimation only and is only applicable if the renal function is stable.       If you have any questions or concerns, please call the clinic at the number listed above.       Sincerely,      Jus Rodney MD

## 2021-08-16 NOTE — PROGRESS NOTES
"SUBJECTIVE:   Nancy Hughes is a 71 year old female who presents for Preventive Visit.      Patient has been advised of split billing requirements and indicates understanding: Yes   Are you in the first 12 months of your Medicare coverage?  No    Healthy Habits:     In general, how would you rate your overall health?  Good    Frequency of exercise:  2-3 days/week    Duration of exercise:  15-30 minutes    Do you usually eat at least 4 servings of fruit and vegetables a day, include whole grains    & fiber and avoid regularly eating high fat or \"junk\" foods?  Yes    Taking medications regularly:  Yes    Medication side effects:  None, No significant flushing and Muscle aches    Ability to successfully perform activities of daily living:  No assistance needed    Home Safety:  No safety concerns identified    Hearing Impairment:  Difficult to understand a speaker at a public meeting or [employer] service and difficulty understanding soft or whispered speech    In the past 6 months, have you been bothered by leaking of urine? Yes    In general, how would you rate your overall mental or emotional health?  Excellent      PHQ-2 Total Score: 0    Additional concerns today:  No    Do you feel safe in your environment? Yes    Have you ever done Advance Care Planning? (For example, a Health Directive, POLST, or a discussion with a medical provider or your loved ones about your wishes): Yes, advance care planning is on file.       Fall risk       Cognitive Screening Clock 2 pts     1) Repeat 3 items (Leader, Season, Table)    2) Clock draw: NORMAL 2 pts  3) 3 item recall: Recalls 3 objects 3 pts  Results: 3 items recalled: COGNITIVE IMPAIRMENT LESS LIKELY    Mini-CogTM Serenity Mueller. Licensed by the author for use in [facility]; reprinted with permission (aristeo@.[email or web address]). All rights reserved.   Word recall 3 points    Do you have sleep apnea, excessive snoring or daytime drowsiness?: no    Reviewed and updated as " "needed this visit by clinical staff  Tobacco  Allergies  Meds              Reviewed and updated as needed this visit by Provider                Social History     Tobacco Use     Smoking status: Former Smoker     Types: Cigarettes     Smokeless tobacco: Never Used   Substance Use Topics     Alcohol use: No     If you drink alcohol do you typically have >3 drinks per day or >7 drinks per week? No    Alcohol Use 8/11/2021   Prescreen: >3 drinks/day or >7 drinks/week? No     She is wondering if I should have a bone density test, wondering if I should discontinue the Fosamax    Current providers sharing in care for this patient include:   Patient Care Team:  Jus Rodney MD as PCP - General  Lauren Obrien RN as Registered Nurse (Diabetes Education)    The following health maintenance items are reviewed in Epic and correct as of today:  Health Maintenance Due   Topic Date Due     ANNUAL REVIEW OF  ORDERS  Never done     HEPATITIS C SCREENING  Never done     MEDICARE ANNUAL WELLNESS VISIT  10/30/2020         Review of Systems      OBJECTIVE:   BP (!) 143/80   Pulse 67   Temp 98.6  F (37  C)   Resp 16   Ht 1.645 m (5' 4.75\")   Wt 77.5 kg (170 lb 14.4 oz)   SpO2 97%   BMI 28.66 kg/m   Estimated body mass index is 28.66 kg/m  as calculated from the following:    Height as of this encounter: 1.645 m (5' 4.75\").    Weight as of this encounter: 77.5 kg (170 lb 14.4 oz).       Physical Exam  GENERAL APPEARANCE: healthy, alert and no distress  EYES: Eyes grossly normal to inspection, PERRL and conjunctivae and sclerae normal  HENT: nose and mouth without ulcers or lesions, oropharynx clear and oral mucous membranes moist  NECK: no adenopathy, no asymmetry, masses, or scars and thyroid normal to palpation  RESP: lungs clear to auscultation - no rales, rhonchi or wheezes  CV: regular rate and rhythm, normal S1 S2, no S3 or S4, no murmur, click or rub, no peripheral edema and peripheral pulses strong  ABDOMEN: " "soft, nontender, no hepatosplenomegaly, no masses and bowel sounds normal  MS: no musculoskeletal defects are noted and gait is age appropriate without ataxia  SKIN: no suspicious lesions or rashes  NEURO: Normal strength and tone, sensory exam grossly normal, mentation intact and speech normal  PSYCH: mentation appears normal and affect normal/bright    Diagnostic Test Results:  Labs reviewed in Epic    ASSESSMENT / PLAN:   1. Encounter for Medicare annual wellness exam    - Hepatitis C Screen Reflex to HCV RNA Quant and Genotype; Future    2. Type 2 diabetes mellitus with diabetic neuropathy, with long-term current use of insulin (H)  On Trulicity, Lantus and Glucophage    Check labs  - Hemoglobin A1c; Future  - Comprehensive metabolic panel; Future  Improving A1c, in comparison from 5 months ago  A1c goal of 8.5 or less  Average daily blood sugar level of 150 or less  Continue current managements  Recheck level in 6 months       3. Essential hypertension, benign  Mildly elevated blood pressure   On amlodipine 5 mg daily, and Metroprolol 50 mg daily    Continue current management   - amLODIPine (NORVASC) 5 MG tablet; Take 1 tablet (5 mg) by mouth daily  Dispense: 90 tablet; Refill: 3    4. Osteopenia of multiple sites  - W/IN 2YR DXA NOT ORDER  On Fosamax    Patient has been advised of split billing requirements and indicates understanding: Yes  COUNSELING:  Reviewed preventive health counseling, as reflected in patient instructions       Regular exercise       Healthy diet/nutrition    Estimated body mass index is 28.66 kg/m  as calculated from the following:    Height as of this encounter: 1.645 m (5' 4.75\").    Weight as of this encounter: 77.5 kg (170 lb 14.4 oz).        She reports that she has quit smoking. Her smoking use included cigarettes. She has never used smokeless tobacco.      Appropriate preventive services were discussed with this patient, including applicable screening as appropriate for " cardiovascular disease, diabetes, osteopenia/osteoporosis, and glaucoma.  As appropriate for age/gender, discussed screening for colorectal cancer, prostate cancer, breast cancer, and cervical cancer. Checklist reviewing preventive services available has been given to the patient.    Reviewed patients plan of care and provided an AVS. The Basic Care Plan (routine screening as documented in Health Maintenance) for Nancy meets the Care Plan requirement. This Care Plan has been established and reviewed with the Patient.    Counseling Resources:  ATP IV Guidelines  Pooled Cohorts Equation Calculator  Breast Cancer Risk Calculator  Breast Cancer: Medication to Reduce Risk  FRAX Risk Assessment  ICSI Preventive Guidelines  Dietary Guidelines for Americans, 2010  [employer] MyPlate  ASA Prophylaxis  Lung CA Screening    Jus Rodney MD  [facility]    Identified Health Risks:    The patient was provided with written information regarding signs of hearing loss.  Information on urinary incontinence and treatment options given to patient.

## 2021-08-17 LAB — HCV AB SERPL QL IA: NONREACTIVE

## 2021-08-20 ENCOUNTER — TELEPHONE (OUTPATIENT)
Dept: FAMILY MEDICINE | Facility: CLINIC | Age: 71
End: 2021-08-20

## 2021-08-20 DIAGNOSIS — M85.89 OSTEOPENIA OF MULTIPLE SITES: Primary | ICD-10-CM

## 2021-08-20 NOTE — TELEPHONE ENCOUNTER
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 She wants to be sure provider ordered for her to have a bone scan done.   Also, she needs a 90 supply of her BD ultra fine Sumaya pen Needles. She uses [facility] as listed in the chart.  She can be reached at [phone].

## 2021-08-23 NOTE — TELEPHONE ENCOUNTER
Per 8/16/21 visit with PCP-    1. Encounter for Medicare annual wellness exam     - Hepatitis C Screen Reflex to HCV RNA Quant and Genotype; Future     2. Type 2 diabetes mellitus with diabetic neuropathy, with long-term current use of insulin (H)  On Trulicity, Lantus and Glucophage     Check labs  - Hemoglobin A1c; Future  - Comprehensive metabolic panel; Future  Improving A1c, in comparison from 5 months ago  A1c goal of 8.5 or less  Average daily blood sugar level of 150 or less  Continue current managements  Recheck level in 6 months         3. Essential hypertension, benign  Mildly elevated blood pressure   On amlodipine 5 mg daily, and Metroprolol 50 mg daily     Continue current management   - amLODIPine (NORVASC) 5 MG tablet; Take 1 tablet (5 mg) by mouth daily  Dispense: 90 tablet; Refill: 3     4. Osteopenia of multiple sites  - W/IN 2YR DXA NOT ORDER  On Fosamax     Patient has been advised of split billing requirements and indicates understanding: Yes  COUNSELING:  Reviewed preventive health counseling, as reflected in patient instructions       Regular exercise       Healthy diet/nutrition

## 2021-08-25 ENCOUNTER — TELEPHONE (OUTPATIENT)
Dept: EDUCATION SERVICES | Facility: CLINIC | Age: 71
End: 2021-08-25

## 2021-08-25 NOTE — TELEPHONE ENCOUNTER
Spoke with patient.  Currently taking Lantus 22 units in the morning and Trulicity 0.75mg weekly.  She has a 3 month supply of Trulicity and is having fasting readings in the 150's.  Plan: Increase to 23 units starting tomorrow.  If readings are still high next week, increase to 24 units.  Call if having low blood sugars.

## 2021-08-25 NOTE — TELEPHONE ENCOUNTER
Patient called. She is concern about her bg levels and would like to know if she should adjust her insulin.     Nancy @ [phone]

## 2021-09-07 ENCOUNTER — MYC REFILL (OUTPATIENT)
Dept: INTERNAL MEDICINE | Facility: CLINIC | Age: 71
End: 2021-09-07

## 2021-09-07 DIAGNOSIS — Z79.4 TYPE 2 DIABETES MELLITUS WITH DIABETIC NEUROPATHY, WITH LONG-TERM CURRENT USE OF INSULIN (H): ICD-10-CM

## 2021-09-07 DIAGNOSIS — E11.40 TYPE 2 DIABETES MELLITUS WITH DIABETIC NEUROPATHY, WITH LONG-TERM CURRENT USE OF INSULIN (H): ICD-10-CM

## 2021-09-07 RX ORDER — GLUCOSAMINE HCL/CHONDROITIN SU 500-400 MG
CAPSULE ORAL
Qty: 300 STRIP | Refills: 3 | OUTPATIENT
Start: 2021-09-07

## 2021-09-13 ENCOUNTER — TELEPHONE (OUTPATIENT)
Dept: FAMILY MEDICINE | Facility: CLINIC | Age: 71
End: 2021-09-13

## 2021-09-13 ENCOUNTER — LAB (OUTPATIENT)
Dept: LAB | Facility: CLINIC | Age: 71
End: 2021-09-13
Payer: COMMERCIAL

## 2021-09-13 DIAGNOSIS — E11.40 TYPE 2 DIABETES MELLITUS WITH DIABETIC NEUROPATHY, WITH LONG-TERM CURRENT USE OF INSULIN (H): ICD-10-CM

## 2021-09-13 DIAGNOSIS — Z79.4 TYPE 2 DIABETES MELLITUS WITH DIABETIC NEUROPATHY, WITH LONG-TERM CURRENT USE OF INSULIN (H): ICD-10-CM

## 2021-09-13 DIAGNOSIS — E87.5 SERUM POTASSIUM ELEVATED: ICD-10-CM

## 2021-09-13 LAB
ANION GAP SERPL CALCULATED.3IONS-SCNC: 10 MMOL/L (ref 5–18)
BUN SERPL-MCNC: 10 MG/DL (ref 8–28)
CALCIUM SERPL-MCNC: 9.4 MG/DL (ref 8.5–10.5)
CHLORIDE BLD-SCNC: 100 MMOL/L (ref 98–107)
CO2 SERPL-SCNC: 26 MMOL/L (ref 22–31)
CREAT SERPL-MCNC: 0.68 MG/DL (ref 0.6–1.1)
GFR SERPL CREATININE-BSD FRML MDRD: 88 ML/MIN/1.73M2
GLUCOSE BLD-MCNC: 86 MG/DL (ref 70–125)
POTASSIUM BLD-SCNC: 5.2 MMOL/L (ref 3.5–5)
SODIUM SERPL-SCNC: 136 MMOL/L (ref 136–145)

## 2021-09-13 PROCEDURE — 80048 BASIC METABOLIC PNL TOTAL CA: CPT

## 2021-09-13 PROCEDURE — 36415 COLL VENOUS BLD VENIPUNCTURE: CPT

## 2021-09-13 NOTE — TELEPHONE ENCOUNTER
Pt is calling about her RX for Insulin Glardine    She uses [facility] as they provide the best deal for her.    The issue is with the recent RX that was sent.    [facility] will not break up a box for insulin.  A box contains 5 pens.    The way the RX is currently written 24 units per day she will only get one box. 60 day supply    She is requesting the RX is written as Dr. Lozano wrote previously for 30 units per day.    This allows her to get a 90 day supply, it is cost effective for her as she pays the same amount for 30 units vs 24 units and she will get a full 90 day supply.      Requesting a new RX is sent to [facility] for 30 units per day.

## 2021-09-15 NOTE — TELEPHONE ENCOUNTER
Per chart and history, the Insulin has always been written as 24 units and again reconciled and reaffirmed at the last OV. Please reconfirm with the patient.

## 2021-09-20 NOTE — TELEPHONE ENCOUNTER
Unfortunately changing and writing the prescription as suggested can not be authorized.   To help with the cost and copay, will do monthly fills.

## 2021-09-20 NOTE — TELEPHONE ENCOUNTER
"Contacted patient and she reports the following-    1. Is currently using 24 units at hs daily.   Though this may be fluctuating as she is working with DM educator regarding lantus and Trulicity dosing.    2. Recent changes to Medicare has impacted her ability to get medication efficiently and cost effective.  She gets more copays if not 3 full months.  [facility] will not [facility] boxes of insulin so unless the script indicates a dosing of 34 units or higher, she will only get 1 box of insulin or what is enough for 2 months.  She is requesting full 3 month sent to [facility].    3. Very educated on her insulin and its administration.  She will not \"overdose\" herself on too much insulin regardless of sig or directions.  "

## 2021-09-23 ENCOUNTER — TRANSFERRED RECORDS (OUTPATIENT)
Dept: HEALTH INFORMATION MANAGEMENT | Facility: CLINIC | Age: 71
End: 2021-09-23

## 2021-09-23 LAB — RETINOPATHY: POSITIVE

## 2021-10-01 DIAGNOSIS — E11.40 TYPE 2 DIABETES MELLITUS WITH DIABETIC NEUROPATHY, WITH LONG-TERM CURRENT USE OF INSULIN (H): ICD-10-CM

## 2021-10-01 DIAGNOSIS — Z79.4 TYPE 2 DIABETES MELLITUS WITH DIABETIC NEUROPATHY, WITH LONG-TERM CURRENT USE OF INSULIN (H): ICD-10-CM

## 2021-10-01 RX ORDER — GABAPENTIN 300 MG/1
300 CAPSULE ORAL AT BEDTIME
Qty: 90 CAPSULE | Refills: 3 | Status: SHIPPED | OUTPATIENT
Start: 2021-10-01 | End: 2022-07-21

## 2021-10-01 NOTE — TELEPHONE ENCOUNTER
"Reason for Call:  Medication or medication refill:    Do you use a [facility]?  Name of the pharmacy and phone number for the current request:      [facility]    Name of the medication requested:     Amlodipine 5 mg    Gabapentin 300 mg    Other request: patient reports her Amlodipine was increased at her last appt to \"take 7.5 mg\" total daily.  Please send new Rx to properly reflect her current dosing.    Can we leave a detailed message on this number? YES    Phone number patient can be reached at: Home number on file [phone] (home)    Best Time: Any time    Call taken on 10/1/2021 at 8:14 AM by Sridhar Burk    "

## 2021-10-01 NOTE — TELEPHONE ENCOUNTER
"Pt last visit 7/8/21   Return in about 6 months (around 2/16/2022) for Annual Wellness Visit.    Current Amlodipine is for 5 mg unable to find Other request: patient reports her Amlodipine was increased at her last appt to \"take 7.5 mg\" total daily.  Please send new Rx to properly reflect her current dosing.  3. Essential hypertension, benign  Mildly elevated blood pressure   On amlodipine 5 mg daily, and Metroprolol 50 mg daily     Continue current management   - amLODIPine (NORVASC) 5 MG tablet; Take 1 tablet (5 mg) by mouth daily  Dispense: 90 tablet; Refill: 3  "

## 2021-10-07 ENCOUNTER — MYC MEDICAL ADVICE (OUTPATIENT)
Dept: FAMILY MEDICINE | Facility: CLINIC | Age: 71
End: 2021-10-07

## 2021-10-07 DIAGNOSIS — I10 ESSENTIAL HYPERTENSION, BENIGN: ICD-10-CM

## 2021-10-08 NOTE — TELEPHONE ENCOUNTER
Pls call patient:     According to the chart, multiple refills of amlodipine 5 mg daily has previously been authorized.   Please confirm the current daily dosage. If she's always been taking 7.5 mg despite written for 5 mg will correct to  7.5 mg.  If blood pressure has been stable and normotensive on 5 mg, recommend to stay on 5 mg daily.

## 2021-10-11 RX ORDER — AMLODIPINE BESYLATE 5 MG/1
7.5 TABLET ORAL DAILY
Qty: 90 TABLET | Refills: 1 | Status: SHIPPED | OUTPATIENT
Start: 2021-10-11 | End: 2021-11-10

## 2021-10-14 ENCOUNTER — MYC MEDICAL ADVICE (OUTPATIENT)
Dept: EDUCATION SERVICES | Facility: CLINIC | Age: 71
End: 2021-10-14

## 2021-10-14 NOTE — TELEPHONE ENCOUNTER
Dr. COMER -     I believe I sent you a staff message on this last week, not 100% sure if I sent it...     Are you ok with pt increasing trulicity from 0.75mg/week to 1.5 mg/week.   Our overall goal was to hopefully work on increasing trulicity to decrease lantus for less risk of low BG.

## 2021-10-15 ENCOUNTER — TELEPHONE (OUTPATIENT)
Dept: EDUCATION SERVICES | Facility: CLINIC | Age: 71
End: 2021-10-15

## 2021-10-15 DIAGNOSIS — Z79.4 TYPE 2 DIABETES MELLITUS WITHOUT COMPLICATION, WITH LONG-TERM CURRENT USE OF INSULIN (H): ICD-10-CM

## 2021-10-15 DIAGNOSIS — E11.9 TYPE 2 DIABETES MELLITUS WITHOUT COMPLICATION, WITH LONG-TERM CURRENT USE OF INSULIN (H): ICD-10-CM

## 2021-10-15 RX ORDER — DULAGLUTIDE 0.75 MG/.5ML
1.5 INJECTION, SOLUTION SUBCUTANEOUS
Qty: 12 ML | Refills: 0 | Status: SHIPPED | OUTPATIENT
Start: 2021-10-15 | End: 2021-11-10

## 2021-10-15 NOTE — TELEPHONE ENCOUNTER
Paul Aguilar ,     I am covering for Dr. COMER today. Looks like she doesn't have any contraindications to the trulicity so I think it should be ok. I have ok'd the med.

## 2021-10-15 NOTE — TELEPHONE ENCOUNTER
Spoke w/ pt. She is looking for update on whether or not Dr. COMER approved the the trulicity as she needs to order. She will use her last injection next Friday.   Discussed message was sent yesterday.   Pt is going to contact Dr. COMER's office to discuss this.

## 2021-10-15 NOTE — TELEPHONE ENCOUNTER
Thanks!    Rx was sent to [facility], pt prefers mail order as it is a lot cheaper, I did switch this to [facility].     Called pt and informed.   Pt is taking 22-23  Units of lantus/day. Discussed decreasing to 18-20/day with increase in trulicity and to decrease by 1-2 units when lows occur. Pt verbalized understanding of this. Will f/u over the phone on 11/11 as scheduled. Pt will call sooner w/ any concerns.

## 2021-10-16 ENCOUNTER — MYC MEDICAL ADVICE (OUTPATIENT)
Dept: EDUCATION SERVICES | Facility: CLINIC | Age: 71
End: 2021-10-16

## 2021-10-16 DIAGNOSIS — E11.9 DIABETES MELLITUS (H): Primary | ICD-10-CM

## 2021-10-18 RX ORDER — DULAGLUTIDE 1.5 MG/.5ML
1.5 INJECTION, SOLUTION SUBCUTANEOUS
Qty: 6 ML | Refills: 0 | Status: SHIPPED | OUTPATIENT
Start: 2021-10-18 | End: 2022-04-01

## 2021-11-05 ENCOUNTER — MYC MEDICAL ADVICE (OUTPATIENT)
Dept: FAMILY MEDICINE | Facility: CLINIC | Age: 71
End: 2021-11-05
Payer: COMMERCIAL

## 2021-11-09 ENCOUNTER — TELEPHONE (OUTPATIENT)
Dept: EDUCATION SERVICES | Facility: CLINIC | Age: 71
End: 2021-11-09
Payer: COMMERCIAL

## 2021-11-09 NOTE — TELEPHONE ENCOUNTER
"Spoke w/ pt to check in on BG. She has taken 2 doses of the 1.5 mg dose of trulicity. This Friday will be her 3rd dose. Pt notes she has not noticed much difference in BG from the 0.75 to the 1.5 mg.   Pt reports she decreased her lantus to 18 units, then slowly had to increase it again, from 19 units to 20 units to 22 units now. Pt again explains BG are about the same. She reports just one low in the last 2 weeks or so.     Discussed will give it the full 4 weeks at 1.5 mg as recommended.11/26 would be the end of the 4 weeks. Pt states she has a 3 month supply of the 1.5 mg so she would like to use that up as it is expensive. Therefore she will stay at the 1.5 mg until the 3 months is over. Then we will readdress if increasing it would be appropriate. Pt states \"even though so far we have not gotten the results we were hoping for from trulicity, I am still happy with it as it is about the same cost as victoza and only once a week.\"     Pt will send a mychart to give an update on her BG at the last week in November so we can get an better idea of how the 1.5 mg dose is working.   "

## 2021-11-10 ENCOUNTER — VIRTUAL VISIT (OUTPATIENT)
Dept: FAMILY MEDICINE | Facility: CLINIC | Age: 71
End: 2021-11-10
Payer: COMMERCIAL

## 2021-11-10 ENCOUNTER — MYC MEDICAL ADVICE (OUTPATIENT)
Dept: EDUCATION SERVICES | Facility: CLINIC | Age: 71
End: 2021-11-10

## 2021-11-10 DIAGNOSIS — E11.40 TYPE 2 DIABETES MELLITUS WITH DIABETIC NEUROPATHY, WITH LONG-TERM CURRENT USE OF INSULIN (H): ICD-10-CM

## 2021-11-10 DIAGNOSIS — Z79.4 TYPE 2 DIABETES MELLITUS WITH DIABETIC NEUROPATHY, WITH LONG-TERM CURRENT USE OF INSULIN (H): ICD-10-CM

## 2021-11-10 DIAGNOSIS — I10 ESSENTIAL HYPERTENSION, BENIGN: ICD-10-CM

## 2021-11-10 PROCEDURE — 99214 OFFICE O/P EST MOD 30 MIN: CPT | Mod: 95 | Performed by: FAMILY MEDICINE

## 2021-11-10 RX ORDER — INSULIN GLARGINE 100 [IU]/ML
25 INJECTION, SOLUTION SUBCUTANEOUS AT BEDTIME
Qty: 30 ML | Refills: 1 | Status: SHIPPED | OUTPATIENT
Start: 2021-11-10 | End: 2022-04-01

## 2021-11-10 RX ORDER — AMLODIPINE BESYLATE 5 MG/1
7.5 TABLET ORAL DAILY
Qty: 135 TABLET | Refills: 3 | Status: SHIPPED | OUTPATIENT
Start: 2021-11-10 | End: 2022-06-20

## 2021-11-10 NOTE — PROGRESS NOTES
Nancy is a 71 year old who is being evaluated via a billable video visit.      How would you like to obtain your AVS? MyChart  If the video visit is dropped, the invitation should be resent by: Text to cell phone: [phone]   Will anyone else be joining your video visit? No      Video Start Time: 4:14 PM    Assessment & Plan     Essential hypertension, benign  reported in normotensive range  refill  - amLODIPine (NORVASC) 5 MG tablet; Take 1.5 tablets (7.5 mg) by mouth daily  continue current plan    Type 2 diabetes mellitus with diabetic neuropathy, with long-term current use of insulin (H)  last A1c of 8.6 on 8/16/2021  keep blood glucose average below 150  refill:  - insulin glargine (LANTUS SOLOSTAR) 100 UNIT/ML pen; Inject 25 Units Subcutaneous At Bedtime  recheck A1c in 3 months                    No follow-ups on file.    Jus Rodney MD  [facility]    Subjective   Nancy is a 71 year old who presents for med check and follow-up of hypertension and diabetes managed on Trulicity, Metformin and Lantus, Metroprolol and amlodipine respectively.  Reporting no intolerance or side effects requesting refill on Lantus and amlodipine.          Review of Systems         Objective           Vitals:  No vitals were obtained today due to virtual visit.    Physical Exam   GENERAL: Healthy, alert and no distress                Video-Visit Details    Type of service:  Video Visit    Video End Time:4:50 PM    Originating Location (pt. Location): Home    Distant Location (provider location):  [facility]     Platform used for Video Visit: [employer]

## 2021-12-06 ENCOUNTER — TRANSFERRED RECORDS (OUTPATIENT)
Dept: HEALTH INFORMATION MANAGEMENT | Facility: CLINIC | Age: 71
End: 2021-12-06
Payer: COMMERCIAL

## 2021-12-06 LAB — RETINOPATHY: POSITIVE

## 2021-12-31 ENCOUNTER — MYC MEDICAL ADVICE (OUTPATIENT)
Dept: EDUCATION SERVICES | Facility: CLINIC | Age: 71
End: 2021-12-31
Payer: COMMERCIAL

## 2021-12-31 DIAGNOSIS — E11.9 DIABETES MELLITUS, TYPE 2 (H): Primary | ICD-10-CM

## 2021-12-31 RX ORDER — DULAGLUTIDE 3 MG/.5ML
3 INJECTION, SOLUTION SUBCUTANEOUS
Qty: 6 ML | Refills: 1 | Status: SHIPPED | OUTPATIENT
Start: 2021-12-31 | End: 2022-04-01

## 2022-01-03 ENCOUNTER — TRANSFERRED RECORDS (OUTPATIENT)
Dept: HEALTH INFORMATION MANAGEMENT | Facility: CLINIC | Age: 72
End: 2022-01-03
Payer: COMMERCIAL

## 2022-01-03 LAB — RETINOPATHY: POSITIVE

## 2022-02-26 ENCOUNTER — HEALTH MAINTENANCE LETTER (OUTPATIENT)
Age: 72
End: 2022-02-26

## 2022-03-01 ENCOUNTER — DOCUMENTATION ONLY (OUTPATIENT)
Dept: LAB | Facility: CLINIC | Age: 72
End: 2022-03-01
Payer: COMMERCIAL

## 2022-03-01 DIAGNOSIS — Z79.4 TYPE 2 DIABETES MELLITUS WITH DIABETIC NEUROPATHY, WITH LONG-TERM CURRENT USE OF INSULIN (H): Primary | ICD-10-CM

## 2022-03-01 DIAGNOSIS — E11.40 TYPE 2 DIABETES MELLITUS WITH DIABETIC NEUROPATHY, WITH LONG-TERM CURRENT USE OF INSULIN (H): Primary | ICD-10-CM

## 2022-03-01 NOTE — PROGRESS NOTES
Nancy GERARDO Gasnow has an upcoming lab appointment for A1C:    Future Appointments   Date Time Provider Department Center   3/3/2022  2:30 PM [facility]   4/1/2022  9:30 AM Jus Rodney MD [facility]     There is no order available. Please review and place  future orders as appropriate or advise pt.    Also to consider:    Health Maintenance Due   Topic     ANNUAL REVIEW OF  ORDERS      A1C      MICROALBUMIN      Erin Page

## 2022-03-01 NOTE — PROGRESS NOTES
Order for A1c is sachin'd up for verification and approval, if necessary. Other labs are to be considered, too, per Erin Page's note below.     Please advise     Aaron Hameed Jr., CMA on 3/1/2022 at 3:05 PM

## 2022-03-03 ENCOUNTER — LAB (OUTPATIENT)
Dept: LAB | Facility: CLINIC | Age: 72
End: 2022-03-03
Payer: COMMERCIAL

## 2022-03-03 DIAGNOSIS — E11.40 TYPE 2 DIABETES MELLITUS WITH DIABETIC NEUROPATHY, WITH LONG-TERM CURRENT USE OF INSULIN (H): ICD-10-CM

## 2022-03-03 DIAGNOSIS — Z79.4 TYPE 2 DIABETES MELLITUS WITH DIABETIC NEUROPATHY, WITH LONG-TERM CURRENT USE OF INSULIN (H): ICD-10-CM

## 2022-03-03 LAB — HBA1C MFR BLD: 9.2 % (ref 0–5.6)

## 2022-03-03 PROCEDURE — 36415 COLL VENOUS BLD VENIPUNCTURE: CPT

## 2022-03-03 PROCEDURE — 83036 HEMOGLOBIN GLYCOSYLATED A1C: CPT

## 2022-03-11 ENCOUNTER — MYC MEDICAL ADVICE (OUTPATIENT)
Dept: FAMILY MEDICINE | Facility: CLINIC | Age: 72
End: 2022-03-11
Payer: COMMERCIAL

## 2022-03-14 NOTE — TELEPHONE ENCOUNTER
Tameka msg:   With my recent A1c results in mind, Should I continue with my present dosages of Trulicity  (3 mg) and Lantus (19 u) for awhile and see how things go or should I increase the Trulicity on my next 3 month order?  Thank you.  Nancyflaco Hughes    Result note:   Recent A1c is elevated and shown to be at 9.2.  A1c goal for your age is to keep below 8.5.       Please advise     Aaron Hameed Jr., DAILY on 3/14/2022 at 9:12 AM

## 2022-03-18 ENCOUNTER — TELEPHONE (OUTPATIENT)
Dept: EDUCATION SERVICES | Facility: CLINIC | Age: 72
End: 2022-03-18
Payer: COMMERCIAL

## 2022-03-18 NOTE — TELEPHONE ENCOUNTER
Spoke w/ pt. She states last week she accidentally wasted a trulicity pen as she accidentally forgot to take the safety cover off. She will take her last dose on 4/1 the same day as her appt with PCP and she is worried that she needs to order the trulicity so that she does not run out. However, she is not sure if PCP will increase trulicity dose or not.     Plan: pt will call pharmacy to explain accidental waste of trulicity pen and see if they can override a replacement pen.   Pt will wait until 4/1 appt to order trulicity. She will ask [facility] if they can do an expedited delivery so she does not run out.

## 2022-04-01 ENCOUNTER — OFFICE VISIT (OUTPATIENT)
Dept: FAMILY MEDICINE | Facility: CLINIC | Age: 72
End: 2022-04-01
Payer: COMMERCIAL

## 2022-04-01 VITALS
RESPIRATION RATE: 16 BRPM | BODY MASS INDEX: 27.66 KG/M2 | OXYGEN SATURATION: 97 % | DIASTOLIC BLOOD PRESSURE: 85 MMHG | HEIGHT: 65 IN | TEMPERATURE: 98.3 F | HEART RATE: 73 BPM | SYSTOLIC BLOOD PRESSURE: 150 MMHG | WEIGHT: 166 LBS

## 2022-04-01 DIAGNOSIS — I10 ESSENTIAL HYPERTENSION, BENIGN: ICD-10-CM

## 2022-04-01 DIAGNOSIS — E78.5 HYPERLIPIDEMIA, UNSPECIFIED HYPERLIPIDEMIA TYPE: ICD-10-CM

## 2022-04-01 DIAGNOSIS — Z79.4 TYPE 2 DIABETES MELLITUS WITH DIABETIC NEUROPATHY, WITH LONG-TERM CURRENT USE OF INSULIN (H): Primary | ICD-10-CM

## 2022-04-01 DIAGNOSIS — E11.40 TYPE 2 DIABETES MELLITUS WITH DIABETIC NEUROPATHY, WITH LONG-TERM CURRENT USE OF INSULIN (H): Primary | ICD-10-CM

## 2022-04-01 PROCEDURE — 91305 COVID-19,PF,PFIZER (12+ YRS): CPT | Performed by: FAMILY MEDICINE

## 2022-04-01 PROCEDURE — 0054A COVID-19,PF,PFIZER (12+ YRS): CPT | Performed by: FAMILY MEDICINE

## 2022-04-01 PROCEDURE — 99215 OFFICE O/P EST HI 40 MIN: CPT | Performed by: FAMILY MEDICINE

## 2022-04-01 RX ORDER — DULAGLUTIDE 3 MG/.5ML
3 INJECTION, SOLUTION SUBCUTANEOUS
Qty: 15 ML | Refills: 3 | Status: SHIPPED | OUTPATIENT
Start: 2022-04-01 | End: 2022-06-20

## 2022-04-01 RX ORDER — INSULIN GLARGINE 100 [IU]/ML
25 INJECTION, SOLUTION SUBCUTANEOUS AT BEDTIME
Qty: 30 ML | Refills: 1 | Status: SHIPPED | OUTPATIENT
Start: 2022-04-01 | End: 2022-05-11

## 2022-04-01 RX ORDER — METOPROLOL SUCCINATE 25 MG/1
75 TABLET, EXTENDED RELEASE ORAL DAILY
Qty: 270 TABLET | Refills: 0 | Status: SHIPPED | OUTPATIENT
Start: 2022-04-01 | End: 2022-08-12

## 2022-04-01 NOTE — PROGRESS NOTES
Assessment & Plan     (E11.40,  Z79.4) Type 2 diabetes mellitus with diabetic neuropathy, with long-term current use of insulin (H)  (primary encounter diagnosis)  Comment: lantus: start injection qhs, start with 15 units and titrate by every 2 units to achieve BG average of < 150   Plan: Albumin Random Urine Quantitative with Creat         Ratio, Dulaglutide (TRULICITY) 3 MG/0.5ML SOPN,        insulin glargine (LANTUS SOLOSTAR) 100 UNIT/ML         pen, Hemoglobin A1c            (E78.5) Hyperlipidemia, unspecified hyperlipidemia type  Comment: On Zetia 10 mg  Plan: Lipid panel reflex to direct LDL Fasting            (I10) Essential hypertension, benign  Comment: not optimized   plan to take metoprolol 75 mg daily   monitor bp and follow up in 3 months   Plan: metoprolol succinate ER (TOPROL-XL) 25 MG 24 hr        tablet                I spent a total of 68 minutes on the day of the visit.   Time spent doing chart review, history and exam, documentation and further activities per the note             Return in about 3 months (around 7/1/2022).    Jus Rodney MD  [facility]    Cathleen Cruz is a 71 year old who presents for med check and follow-up of diabetes, with last A1c of 9.2 as of 4 weeks ago, and managed on Trulicity  , Lantus 25 units, and Metformin 1000 mg twice daily.    BG: Averaging between .    Hyperlipidemia:  on Zetia for cholesterol management and previously intolerant of the statins causing myalgia   She is due for the lipid labs today.        History of Present Illness       Diabetes:   She presents for follow up of diabetes.  She is checking home blood glucose one time daily. She checks blood glucose before meals.  Blood glucose is sometimes over 200 and sometimes over 70. She is aware of hypoglycemia symptoms including shakiness, weakness and other. She is concerned about blood sugar frequently over 200 and low blood sugar, several less than 70 in the past  "few weeks. She is having burning in feet and excessive thirst.         She eats 2-3 servings of fruits and vegetables daily.She consumes 0 sweetened beverage(s) daily.She exercises with enough effort to increase her heart rate 20 to 29 minutes per day.  She exercises with enough effort to increase her heart rate 5 days per week.   She is taking medications regularly.      Review of Systems         Objective    BP (!) 150/85   Pulse 73   Temp 98.3  F (36.8  C)   Resp 16   Ht 1.645 m (5' 4.75\")   Wt 75.3 kg (166 lb)   SpO2 97%   BMI 27.84 kg/m    Body mass index is 27.84 kg/m .       Physical Exam                   "

## 2022-04-01 NOTE — PATIENT INSTRUCTIONS
lantus: start injection qhs, start with 15 units and titrate by  2 units every 2-3 days to achieve BG average of < 150     Continue will all other diabetic meds as is.     Recheck labs in 3 months ( prior to your follow up appointment )     Metoprolol : taking 75 mg daily   Monitor BP.

## 2022-04-29 DIAGNOSIS — Z79.4 TYPE 2 DIABETES MELLITUS WITH DIABETIC NEUROPATHY, WITH LONG-TERM CURRENT USE OF INSULIN (H): ICD-10-CM

## 2022-04-29 DIAGNOSIS — E78.49 OTHER HYPERLIPIDEMIA: ICD-10-CM

## 2022-04-29 DIAGNOSIS — E11.40 TYPE 2 DIABETES MELLITUS WITH DIABETIC NEUROPATHY, WITH LONG-TERM CURRENT USE OF INSULIN (H): ICD-10-CM

## 2022-05-02 RX ORDER — EZETIMIBE 10 MG/1
10 TABLET ORAL DAILY
Qty: 90 TABLET | Refills: 0 | Status: SHIPPED | OUTPATIENT
Start: 2022-05-02 | End: 2022-07-21

## 2022-05-02 NOTE — TELEPHONE ENCOUNTER
"Routing refill request to provider for review/approval because:  Labs not current:  Lipid panel  Early refill requested.    Last Written Prescription Date:  9/20/21  Last Fill Quantity: 90,  # refills: 2   Last office visit provider:  4/1/22     Requested Prescriptions   Pending Prescriptions Disp Refills     ezetimibe (ZETIA) 10 MG tablet 90 tablet 2     Sig: Take 1 tablet (10 mg) by mouth daily       Antihyperlipidemic agents Failed - 5/2/2022 11:44 AM        Failed - Lipid panel on file in past 12 mos     Recent Labs   Lab Test 04/26/21  0955   CHOL 161   TRIG 55   HDL 69   LDL 81               Passed - Normal serum ALT on record in past 12 mos     Recent Labs   Lab Test 08/16/21  1031   ALT 21             Passed - Recent (12 mo) or future (30 days) visit within the authorizing provider's specialty     Patient has had an office visit with the authorizing provider or a provider within the authorizing providers department within the previous 12 mos or has a future within next 30 days. See \"Patient Info\" tab in inbasket, or \"Choose Columns\" in Meds & Orders section of the refill encounter.              Passed - Medication is active on med list        Passed - Patient is age 18 years or older        Passed - No active pregnancy on record        Passed - No positive pregnancy test in past 12 mos         Signed Prescriptions Disp Refills    metFORMIN (GLUCOPHAGE) 1000 MG tablet 180 tablet 1     Sig: Take 1 tablet (1,000 mg) by mouth 2 times daily (with meals)       Biguanide Agents Passed - 5/2/2022 11:44 AM        Passed - Patient is age 10 or older        Passed - Patient has documented A1c within the specified period of time.     If HgbA1C is 8 or greater, it needs to be on file within the past 3 months.  If less than 8, must be on file within the past 6 months.     Recent Labs   Lab Test 03/03/22  1428   A1C 9.2*             Passed - Patient's CR is NOT>1.4 OR Patient's EGFR is NOT<45 within past 12 mos.     Recent " "Labs   Lab Test 09/13/21  1027 08/16/21  1031 03/15/21  1315   GFRESTIMATED 88   < > >60   GFRESTBLACK  --   --  >60    < > = values in this interval not displayed.       Recent Labs   Lab Test 09/13/21  1027   CR 0.68             Passed - Patient does NOT have a diagnosis of CHF.        Passed - Medication is active on med list        Passed - Patient is not pregnant        Passed - Patient has not had a positive pregnancy test within the past 12 mos.         Passed - Recent (6 mo) or future (30 days) visit within the authorizing provider's specialty     Patient had office visit in the last 6 months or has a visit in the next 30 days with authorizing provider or within the authorizing provider's specialty.  See \"Patient Info\" tab in inbasket, or \"Choose Columns\" in Meds & Orders section of the refill encounter.                 Andre De La Torre RN 05/02/22 11:45 AM    "

## 2022-05-11 ENCOUNTER — VIRTUAL VISIT (OUTPATIENT)
Dept: PHARMACY | Facility: CLINIC | Age: 72
End: 2022-05-11
Payer: COMMERCIAL

## 2022-05-11 DIAGNOSIS — M85.89 OSTEOPENIA OF MULTIPLE SITES: ICD-10-CM

## 2022-05-11 DIAGNOSIS — E11.40 TYPE 2 DIABETES MELLITUS WITH DIABETIC NEUROPATHY, WITH LONG-TERM CURRENT USE OF INSULIN (H): Primary | ICD-10-CM

## 2022-05-11 DIAGNOSIS — Z79.4 TYPE 2 DIABETES MELLITUS WITH DIABETIC NEUROPATHY, WITH LONG-TERM CURRENT USE OF INSULIN (H): Primary | ICD-10-CM

## 2022-05-11 DIAGNOSIS — E78.5 HYPERLIPIDEMIA, UNSPECIFIED HYPERLIPIDEMIA TYPE: ICD-10-CM

## 2022-05-11 DIAGNOSIS — I10 ESSENTIAL HYPERTENSION, BENIGN: ICD-10-CM

## 2022-05-11 DIAGNOSIS — Z86.69 HISTORY OF MIGRAINE: ICD-10-CM

## 2022-05-11 PROCEDURE — 99607 MTMS BY PHARM ADDL 15 MIN: CPT | Performed by: PHARMACIST

## 2022-05-11 PROCEDURE — 99605 MTMS BY PHARM NP 15 MIN: CPT | Performed by: PHARMACIST

## 2022-05-11 RX ORDER — INSULIN GLARGINE 100 [IU]/ML
13 INJECTION, SOLUTION SUBCUTANEOUS AT BEDTIME
Qty: 30 ML | Refills: 1 | COMMUNITY
Start: 2022-05-11 | End: 2022-12-01

## 2022-05-11 NOTE — LETTER
May 11, 2022  Nancy Hughes  [address]   [place] [address]    Dear Ms. Hughes, [facility]        Thank you for talking with me on May 11, 2022 about your health and medications. As a follow-up to our conversation, I have included two documents:      1. Your Recommended To-Do List has steps you should take to get the best results from your medications.  2. Your Medication List will help you keep track of your medications and how to take them.    If you want to talk about these documents, please call Toan Ramírez PharmD at phone: [phone], Monday-Friday 8-4:30pm.    I look forward to working with you and your doctors to make sure your medications work well for you.    Sincerely,    Toan Ramírez PharmD  [facility] Pharmacist, [facility]

## 2022-05-11 NOTE — LETTER
"Recommended To-Do List      Prepared on: 5/11/2022     You can get the best results from your medications by completing the items on this \"To-Do List.\"      Bring your To-Do List when you go to your doctor. And, share it with your family or caregivers.    My To-Do List:  What we talked about: What I should do:   The importance of taking your medication as intended    Education: Educated to take ezetimibe and second dose of magnesium in the evening          What we talked about: What I should do:   Needing additional monitoring    Self-monitor: monitor blood sugars fasting and postprandially every day          What we talked about: What I should do:   Your medication dosage being too high    Decrease your dosage of Lantus SoloStar to 14 units once daily for 1 week, then decrease to 12 units once daily for 1 week, and continue to taper down by 2 units weekly          What we talked about: What I should do:                       "

## 2022-05-11 NOTE — PATIENT INSTRUCTIONS
PLAN:                            1. Decrease Lantus to 14 units daily, decrease dose by 2 units weekly   2. Monitor fasting and post-prandial blood sugars   3. Educate to adjust medication timing (magnesium and zetia)    Follow-up: Return in about 2 weeks (around 5/25/2022) for Follow up, with me, using a phone visit.

## 2022-05-11 NOTE — LETTER
_  Medication List        Prepared on: 5/11/2022     Bring your Medication List when you go to the doctor, hospital, or   emergency room. And, share it with your family or caregivers.     Note any changes to how you take your medications.  Cross out medications when you no longer use them.    Medication How I take it Why I use it Prescriber   alendronate (FOSAMAX) 70 MG tablet Take 1 tablet (70 mg) by mouth every 7 days Osteopenia of multiple sites Jus Rodney MD   amLODIPine (NORVASC) 5 MG tablet Take 1.5 tablets (7.5 mg) by mouth daily Essential Hypertension, Benign Jus Rodney MD   aspirin (ASA) 81 MG chewable tablet Take 1 tablet by mouth daily  type 2 diabetes, primary prevention Jus Rodney MD   aspirin-acetaminophen-caffeine (EXCEDRIN MIGRAINE) 250-250-65 mg per tablet Take 1 tablet by mouth every 6 hours as needed  history of migraines  self   blood glucose test strips [BLOOD GLUCOSE TEST STRIPS] Dispense brand per patient's insurance at pharmacy discretion. Patient test times a day. Type 2 diabetes mellitus with diabetic neuropathy, with long-term current use of insulin (H) Jus Rodney MD   calcium citrate-vitamin D (CALCIUM CITRATE +) 315-200 mg-unit per tablet Take 1 tablet by mouth 2 times daily  osteopenia Jus Rodney MD   DOCUSATE CALCIUM (STOOL SOFTENER ORAL) Take 100 mg by mouth daily  constipation  self   Dulaglutide (TRULICITY) 3 MG/0.5ML SOPN Inject 3 mg Subcutaneous every 7 days Type 2 diabetes mellitus with diabetic neuropathy, with long-term current use of insulin (H) Jus Rodney MD   ezetimibe (ZETIA) 10 MG tablet Take 1 tablet (10 mg) by mouth daily Other hyperlipidemia Jus Rodney MD   gabapentin (NEURONTIN) 300 MG capsule Take 1 capsule (300 mg) by mouth At Bedtime Type 2 diabetes mellitus with diabetic neuropathy, with long-term current use of insulin (H) Jus Rodney MD   insulin glargine (LANTUS SOLOSTAR) 100 UNIT/ML pen Inject 14 Units  "Subcutaneous At Bedtime Dispense with applicable Pen needles Type 2 diabetes mellitus with diabetic neuropathy, with long-term current use of insulin (H) Jus Rodney MD   insulin pen needle (32G X 4 MM) 32G X 4 MM miscellaneous [PEN NEEDLE, DIABETIC (BD ULTRA-FINE SHERRY PEN NEEDLE) 32 GAUGE X 5/32\" NDLE] Use twice daily with insulin Type 2 Diabetes Mellitus, Uncontrolled (H) Jus Rodney MD   lancets 30 gauge Misc [LANCETS 30 GAUGE MISC] Use 1 each As Directed 3 (three) times a day. Use one lancet with your Verio meter to check blood sugar as directed  type 2 diabetes Jus Rodney MD   Magnesium Oxide 250 MG TABS Take 1 tablet (250 mg) by mouth 2 times daily Type 2 diabetes mellitus with diabetic neuropathy, with long-term current use of insulin (H) Jus Rodney MD   metFORMIN (GLUCOPHAGE) 1000 MG tablet Take 1 tablet (1,000 mg) by mouth 2 times daily (with meals) Type 2 diabetes mellitus with diabetic neuropathy, with long-term current use of insulin (H) Jus Rodney MD   metoprolol succinate ER (TOPROL-XL) 25 MG 24 hr tablet Take 3 tablets (75 mg) by mouth daily Essential Hypertension, Benign Jus Rodney MD   Multiple Vitamins-Minerals (PRESERVISION AREDS 2 PO) Take 1 tablet by mouth 2 times daily  General health  self   MULTIVITAMIN (MULTIPLE VITAMIN ESSENTIAL ORAL) Take 1 tablet by mouth daily  General health  self         Add new medications, over-the-counter drugs, herbals, vitamins, or  minerals in the blank rows below.    Medication How I take it Why I use it Prescriber                          Allergies:      lisinopril; azithromycin; clindamycin; statins-hmg-coa reductase inhibitors [hmg-coa-r inhibitors]        Side effects I have had:               Other Information:              My notes and questions:  "

## 2022-05-11 NOTE — PROGRESS NOTES
Medication Therapy Management (MTM) Encounter    ASSESSMENT:                            Medication Adherence/Access: No issues identified    1. Type 2 diabetes mellitus with diabetic neuropathy, with long-term current use of insulin (H)  Not at goal A1c less than 7% per ADA guidelines.  Per chart review her blood sugars have continued to be very labile since that she has started adjusting Trulicity and Lantus dosing.  Consider whether basal insulin has provided much benefit and stability now that she is on Trulicity.  I would advocate for starting a slow and persistent to taper down of Lantus, starting with decreasing dose to 14 units once daily for a week, then decreasing by 2 units to 12 units daily for 1 week, and continuing to taper by 2 units weekly.  We will monitor blood sugars daily to assess for patterns.  I would recommend that we continue to titrate up Trulicity to 4.5 mg weekly when she runs out of her current supply of once weekly 3 mg dose which she has tolerated without adverse effects.  I expect that her elevated blood sugars are largely due to postprandial blood sugars.  She will monitor fasting and postprandial blood sugars and would likely benefit from SGLT2 inhibitor to help minimize risk of hypoglycemia and have a greater effect on postprandial sugars.  Stable on aspirin, no statin due to intolerance however on Zetia, and no ACE inhibitor due to history of angioedema.  I will discuss with her diabetes educator as well.    2. Benign Essential Hypertension  Not at goal blood pressure less than 140/90 per JNC 8.  Reviewed that she has not yet adjusted her metoprolol until she completes her current prescription of 50 mg tablets.  She will consider monitoring blood pressure and pulse at home prior to follow-up appointment.  I have encouraged her to bring her blood pressure cuff to her next clinic follow-up for calibration.  Tolerating regimen without adverse effects.    3. Hyperlipidemia, unspecified  hyperlipidemia type  Stable on ezetimibe, tolerating without adverse effects.  History of intolerance to statins due to significant myalgias and worsening of neuropathy.  Consider adjusting ezetimibe to evening dosing along with her second dose of magnesium.    4. Osteopenia of multiple sites  Bone density improved while on alendronate once weekly.  Tolerating without significant adverse effects.  Recommend to continue current regimen of alendronate, calcium, vitamin D and reevaluate DEXA scan in 1 year.    5. History of migraine  Stable, with infrequent need of over-the-counter Excedrin Migraine.    PLAN:                            1. Decrease Lantus to 14 units daily, decrease dose by 2 units weekly   2. Monitor fasting and post-prandial blood sugars   3. Educate to adjust medication timing (magnesium and zetia)    Follow-up: Return in about 2 weeks (around 5/25/2022) for Follow up, with me, using a phone visit.    SUBJECTIVE/OBJECTIVE:                          Nancy Hughes is a 71 year old female called for an initial visit. She was referred to me from [facility] part D.      Reason for visit: Medication management Initial.    Allergies/ADRs: Reviewed in chart  Past Medical History: Reviewed in chart  Tobacco: She reports that she has quit smoking. Her smoking use included cigarettes. She has never used smokeless tobacco.  Alcohol: Rarely.     Medication Adherence/Access: no issues reported    Type 2 Diabetes: She continues to follow with her PCP and diabetes educator with regards to her blood sugars.  She has ongoing frustrations about elevated A1c and very labile blood sugars as shown below.  She tries to focus on keeping her meals to 30 to 35 g of carbs.  She does keep notes on her diet however she has not been able to find patterns thus far.  She has been adjusting her insulin, most recently fluctuating between 16 and 17 units daily.  However the blood sugars do not necessarily correlate with these adjustments.  As  her dose of insulin has decreased she has had significantly less hypoglycemia.  She is tolerating 3 mg weekly dose of Trulicity without adverse effects.  She had previously been on Victoza.  She currently has 2 months of Trulicity left at home.  Continues on metformin 1000 mg twice daily, denies diarrhea, however tends to be more constipated on her current medication regimen.  She takes a daily stool softener which is a effective.  She does struggle with peripheral neuropathy, which tends to present as leg spasms.  This has become a very severe and was started on gabapentin at bedtime.  This does help her neuropathy/cramping symptoms which was very severe in her abductor muscles bilaterally.  She finds that magnesium may help with this as well.  She does follow closely with her retinal specialist, and takes PreserVision AREDS 2.    Blood sugars: labile sugars.   Fastin, 166, 96, 80, 176, 55, 195  Two hours post-prandial: 332     Current Regimen:   Metformin 1000mg twice daily   Lantus : adjusting dose, trying to lower dose as Trulicity dose increases. Taking 16-17 units daily.  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  Currently taking amlodipine and metoprolol.  She is tolerating amlodipine better at a lower dose of 7.5 mg daily.  This seemed to exacerbate her neuropathy at a higher dose.  She continues to take metoprolol 50 mg once daily, she has not yet gotten her new prescription for 75 mg daily.  She does have an arm a blood pressure  "cuff at home, however does not monitor this regularly.  Tolerating current regimen without adverse effects.  BP Readings from Last 3 Encounters:   04/01/22 (!) 150/85   08/16/21 (!) 143/80   07/09/21 128/76     Hyperlipidemia: Due to history of statin related myalgias she takes ezetimibe daily in the afternoon.  Denies signs or symptoms of adverse effects.  LDL Cholesterol Calculated   Date Value Ref Range Status   04/26/2021 81 <=129 mg/dL Final     Osteopenia: She continues on calcium citrate and vitamin D in addition to multivitamin.  She is taking alendronate 70 mg once weekly.  When she doses her alendronate this does bother her esophagus a \"little bit\" but not enough for her to be worried about this.  She has a history of GERD but does not require daily medications.  She has been on alendronate so far about 3 years.  Based on previous DEXA scan in 2021 she has had an excellent response thus far to alendronate.  Lab Results   Component Value Date    PTHI 33 09/27/2019      Lab Results   Component Value Date    MICHOACANO 9.4 09/13/2021     Component      Latest Ref Rng & Units 9/27/2019   Vitamin D, Total (25-Hydroxy)      30.0 - 80.0 ng/mL 51.7     History of migraine: only uses excedrin migraine a few times per year, effective when taken.     Today's Vitals: There were no vitals taken for this visit.  ----------------    I spent 60 minutes with this patient today. All changes were made via collaborative practice agreement with Jus Rodney MD. A copy of the visit note was provided to the patient's provider(s).    The patient was sent via [employer] a summary of these recommendations.     Toan Ramírez, PharmD, BCACP  Medication Management (MTM) Pharmacist  [facility]    Telemedicine Visit Details  Type of service:  Telephone visit  Start Time: 10AM  End Time: 11:00 AM  Originating Location (patient location): [place]  Distant Location (provider location):  [facility] [place]     Medication Therapy Recommendations  Hyperlipemia    Current Medication: ezetimibe (ZETIA) 10 MG tablet   Rationale: Does not understand instructions - Adherence - Adherence   Recommendation: Provide Education   Status: Patient Agreed - Adherence/Education         Type 2 diabetes mellitus with diabetic neuropathy, with long-term current use of insulin (H)    Current Medication: Dulaglutide (TRULICITY) 3 MG/0.5ML SOPN   Rationale: Medication requires monitoring - Needs additional monitoring   Recommendation: Self-Monitoring   Status: Patient Agreed - Adherence/Education          Current Medication: insulin glargine (LANTUS SOLOSTAR) 100 UNIT/ML pen   Rationale: Dose too high - Dosage too high - Safety   Recommendation: Decrease Dose   Status: Accepted per CPA

## 2022-05-26 ENCOUNTER — VIRTUAL VISIT (OUTPATIENT)
Dept: PHARMACY | Facility: CLINIC | Age: 72
End: 2022-05-26
Payer: COMMERCIAL

## 2022-05-26 ENCOUNTER — TELEPHONE (OUTPATIENT)
Dept: FAMILY MEDICINE | Facility: CLINIC | Age: 72
End: 2022-05-26
Payer: COMMERCIAL

## 2022-05-26 DIAGNOSIS — Z79.4 TYPE 2 DIABETES MELLITUS WITH DIABETIC NEUROPATHY, WITH LONG-TERM CURRENT USE OF INSULIN (H): Primary | ICD-10-CM

## 2022-05-26 DIAGNOSIS — E11.40 TYPE 2 DIABETES MELLITUS WITH DIABETIC NEUROPATHY, WITH LONG-TERM CURRENT USE OF INSULIN (H): Primary | ICD-10-CM

## 2022-05-26 PROCEDURE — 99606 MTMS BY PHARM EST 15 MIN: CPT | Performed by: PHARMACIST

## 2022-05-26 PROCEDURE — 99607 MTMS BY PHARM ADDL 15 MIN: CPT | Performed by: PHARMACIST

## 2022-05-26 RX ORDER — PROCHLORPERAZINE 25 MG/1
1 SUPPOSITORY RECTAL
Qty: 3 EACH | Refills: 11 | Status: SHIPPED | OUTPATIENT
Start: 2022-05-26 | End: 2022-05-26

## 2022-05-26 RX ORDER — PROCHLORPERAZINE 25 MG/1
1 SUPPOSITORY RECTAL
Qty: 1 EACH | Refills: 3 | Status: SHIPPED | OUTPATIENT
Start: 2022-05-26 | End: 2022-05-26

## 2022-05-26 RX ORDER — PROCHLORPERAZINE 25 MG/1
1 SUPPOSITORY RECTAL ONCE
Qty: 1 EACH | Refills: 0 | Status: SHIPPED | OUTPATIENT
Start: 2022-05-26 | End: 2022-05-26

## 2022-05-26 NOTE — PATIENT INSTRUCTIONS
PLAN:                            1. Start Dexcom G6     Follow-up: Return in about 2 weeks (around 6/9/2022) for Follow up, with me, using a phone visit.

## 2022-05-26 NOTE — PROGRESS NOTES
Medication Therapy Management (MTM) Encounter    ASSESSMENT:                            Medication Adherence/Access: No issues identified    1. Type 2 diabetes mellitus with diabetic neuropathy, with long-term current use of insulin (H)  Not at goal A1c less than 7% per ADA guidelines.  Despite decreased dose of Lantus her blood sugars have continued to be labile.  She has not had any episodes of morning hypoglycemia on 13 units of Lantus therefore recommend to continue.  Tolerating current doses of Trulicity and metformin as well.  Due to ongoing labile blood sugars recommend to assess for coverage of personal use of CGM.  We will send to her local pharmacy to see if covered, however will likely need to use a medical supply company.  Her postprandial blood sugars are significantly elevated, therefore would likely benefit from additional medication.  Historically she has had significant hypoglycemia on mealtime insulin, but as discussed previously she would likely benefit from SGLT2 inhibitor to decrease postprandial hyperglycemia and minimize risk of hypoglycemia. Stable on aspirin, no statin due to intolerance however on Zetia, and no ACE inhibitor due to history of angioedema.    PLAN:                            1. Start Dexcom G6     Follow-up: Return in about 2 weeks (around 6/9/2022) for Follow up, with me, using a phone visit.    SUBJECTIVE/OBJECTIVE:                          Nancy Hughes is a 71 year old female called for a follow up visit from 5/11/2022. She was referred to me from [facility] part D.      Reason for visit: Medication management Follow up     Allergies/ADRs: Reviewed in chart  Past Medical History: Reviewed in chart  Tobacco: She reports that she has quit smoking. Her smoking use included cigarettes. She has never used smokeless tobacco.  Alcohol: Rarely.     Medication Adherence/Access: no issues reported    Type 2 Diabetes: She continues to follow with her PCP and diabetes educator with regards  to her blood sugars.  She has ongoing frustrations about elevated A1c and very labile blood sugars as shown below.  She tries to focus on keeping her meals to 30 to 35 g of carbs.  She is tolerating 3 mg weekly dose of Trulicity without adverse effects. Continues on metformin 1000 mg twice daily, denies diarrhea, however tends to be more constipated on her current medication regimen. She had previously been on meal time insulin but didn't feel well and frustrated with meal time insulin. She was also chasing low sugars.     Blood sugars: labile sugars.   Fastin,89,54,256,211,83,127,293,49,131 (decreased lantus to 13 units),76,274,108,204  Two hours post-prandial: 354, 273, 462, 395, 212   Bedtime: >400, 370, 409     Blood sugars: labile sugars.   Fastin, 166, 96, 80, 176, 55, 195  Two hours post-prandial: 332     Current Regimen:   Metformin 1000mg twice daily   Lantus : 14 units ---> 13 units daily   Trulicity 3mg weekly (previously was on victoza) two months left of medication     Hemoglobin A1C   Date Value Ref Range Status   2022 9.2 (H) 0.0 - 5.6 % Final   2021 8.6 (H) 0.0 - 5.6 % Final   03/15/2021 9.6 (H) <=5.6 % Final      Microalbumin Urine mg/dL   Date Value Ref Range Status   03/15/2021 <0.50 0.00 - 1.99 mg/dL Final     Creatinine   Date Value Ref Range Status   2021 0.68 0.60 - 1.10 mg/dL Final     Today's Vitals: There were no vitals taken for this visit.  ----------------    I spent 27 minutes with this patient today. All changes were made via collaborative practice agreement with Jus Rodney MD. A copy of the visit note was provided to the patient's provider(s).    The patient was sent via [employer] a summary of these recommendations.     Toan Ramírez, PharmD, BCACP  Medication Management (MTM) Pharmacist  [facility]    Telemedicine Visit Details  Type of service:  Telephone visit  Start Time: 10AM  End Time: 10:27 AM  Originating Location (patient  location): Home  Distant Location (provider location):  [facility]     Medication Therapy Recommendations  Type 2 diabetes mellitus with diabetic neuropathy, with long-term current use of insulin (H)    Current Medication: insulin glargine (LANTUS SOLOSTAR) 100 UNIT/ML pen   Rationale: Medication requires monitoring - Needs additional monitoring   Recommendation: Self-Monitoring - Dexcom G6 Sensor Misc   Status: Accepted per CPA

## 2022-06-01 ENCOUNTER — TELEPHONE (OUTPATIENT)
Dept: FAMILY MEDICINE | Facility: CLINIC | Age: 72
End: 2022-06-01

## 2022-06-01 NOTE — TELEPHONE ENCOUNTER
PA Initiation    Medication: Dexcom G6 Sensors  Insurance Company: [employer] - Phone [phone] Fax [phone]  Pharmacy Filling the Rx: [id number] [place], [place] - [address]  Filling Pharmacy Phone: [phone]  Filling Pharmacy Fax:    Start Date: 6/1/2022    Central Prior Authorization Team   Phone: [phone]

## 2022-06-01 NOTE — TELEPHONE ENCOUNTER
PA Initiation    Medication: DEXCOM G6 Transmitter  Insurance Company: [employer] - Phone [phone] Fax [phone]  Pharmacy Filling the Rx: [facility] [address] [place], [place] - [address]  Filling Pharmacy Phone: [phone]  Filling Pharmacy Fax:    Start Date: 6/1/2022    Central Prior Authorization Team   Phone: [phone]

## 2022-06-02 ENCOUNTER — TELEPHONE (OUTPATIENT)
Dept: FAMILY MEDICINE | Facility: CLINIC | Age: 72
End: 2022-06-02
Payer: COMMERCIAL

## 2022-06-02 NOTE — TELEPHONE ENCOUNTER
PRIOR AUTHORIZATION DENIED    Medication: Dexcom G6 Sensors    Denial Date: 6/2/2022    Denial Rational:       Appeal Information:

## 2022-06-02 NOTE — TELEPHONE ENCOUNTER
Please inform patient of the PA denial by insurance.   May still consider the usual blood glucose meter, and if needed, will send a kit.

## 2022-06-02 NOTE — TELEPHONE ENCOUNTER
PRIOR AUTHORIZATION DENIED    Medication: DEXCOM G6 Transmitter    Denial Date: 6/2/2022    Denial Rational:       Appeal Information:

## 2022-06-02 NOTE — TELEPHONE ENCOUNTER
Writer called and spoke with the patient, she verbalized understanding and stated that no, she does not need a new kit, she also verbalized being very disappointed.

## 2022-06-02 NOTE — TELEPHONE ENCOUNTER
DIAGNOSIS: Right shoulder pain per pt no images self referred   APPOINTMENT DATE: 9/9   NOTES STATUS DETAILS   OFFICE NOTE from referring provider N/A    OFFICE NOTE from other specialist N/A    DISCHARGE SUMMARY from hospital N/A    DISCHARGE REPORT from the ER N/A    OPERATIVE REPORT N/A    MEDICATION LIST Internal    MRI N/A    CT SCAN N/A    XRAYS (IMAGES & REPORTS) N/A               Please inform patient of the PA denial by insurance.   May still consider the usual blood glucose meter, and if needed, will send a kit.

## 2022-06-10 ENCOUNTER — TRANSFERRED RECORDS (OUTPATIENT)
Dept: HEALTH INFORMATION MANAGEMENT | Facility: CLINIC | Age: 72
End: 2022-06-10
Payer: COMMERCIAL

## 2022-06-10 LAB — RETINOPATHY: POSITIVE

## 2022-06-16 ENCOUNTER — LAB (OUTPATIENT)
Dept: LAB | Facility: CLINIC | Age: 72
End: 2022-06-16
Payer: COMMERCIAL

## 2022-06-16 DIAGNOSIS — Z79.4 TYPE 2 DIABETES MELLITUS WITH DIABETIC NEUROPATHY, WITH LONG-TERM CURRENT USE OF INSULIN (H): ICD-10-CM

## 2022-06-16 DIAGNOSIS — E78.5 HYPERLIPIDEMIA, UNSPECIFIED HYPERLIPIDEMIA TYPE: ICD-10-CM

## 2022-06-16 DIAGNOSIS — E11.40 TYPE 2 DIABETES MELLITUS WITH DIABETIC NEUROPATHY, WITH LONG-TERM CURRENT USE OF INSULIN (H): ICD-10-CM

## 2022-06-16 LAB
CHOLEST SERPL-MCNC: 183 MG/DL
CREAT UR-MCNC: 90 MG/DL
FASTING STATUS PATIENT QL REPORTED: YES
HBA1C MFR BLD: 11.5 % (ref 0–5.6)
HDLC SERPL-MCNC: 66 MG/DL
LDLC SERPL CALC-MCNC: 99 MG/DL
MICROALBUMIN UR-MCNC: 6.48 MG/DL (ref 0–1.99)
MICROALBUMIN/CREAT UR: 72 MG/G CR
TRIGL SERPL-MCNC: 91 MG/DL

## 2022-06-16 PROCEDURE — 83036 HEMOGLOBIN GLYCOSYLATED A1C: CPT

## 2022-06-16 PROCEDURE — 80061 LIPID PANEL: CPT

## 2022-06-16 PROCEDURE — 36415 COLL VENOUS BLD VENIPUNCTURE: CPT

## 2022-06-16 PROCEDURE — 82043 UR ALBUMIN QUANTITATIVE: CPT

## 2022-06-20 ENCOUNTER — OFFICE VISIT (OUTPATIENT)
Dept: FAMILY MEDICINE | Facility: CLINIC | Age: 72
End: 2022-06-20
Payer: COMMERCIAL

## 2022-06-20 ENCOUNTER — TELEPHONE (OUTPATIENT)
Dept: FAMILY MEDICINE | Facility: CLINIC | Age: 72
End: 2022-06-20

## 2022-06-20 VITALS
OXYGEN SATURATION: 97 % | SYSTOLIC BLOOD PRESSURE: 128 MMHG | BODY MASS INDEX: 25.66 KG/M2 | WEIGHT: 154 LBS | RESPIRATION RATE: 18 BRPM | HEART RATE: 74 BPM | HEIGHT: 65 IN | TEMPERATURE: 98.4 F | DIASTOLIC BLOOD PRESSURE: 68 MMHG

## 2022-06-20 DIAGNOSIS — Z79.4 TYPE 2 DIABETES MELLITUS WITH DIABETIC NEUROPATHY, WITH LONG-TERM CURRENT USE OF INSULIN (H): ICD-10-CM

## 2022-06-20 DIAGNOSIS — E11.40 TYPE 2 DIABETES MELLITUS WITH DIABETIC NEUROPATHY, WITH LONG-TERM CURRENT USE OF INSULIN (H): ICD-10-CM

## 2022-06-20 DIAGNOSIS — I10 ESSENTIAL HYPERTENSION, BENIGN: ICD-10-CM

## 2022-06-20 PROCEDURE — 99417 PROLNG OP E/M EACH 15 MIN: CPT | Performed by: FAMILY MEDICINE

## 2022-06-20 PROCEDURE — 99215 OFFICE O/P EST HI 40 MIN: CPT | Performed by: FAMILY MEDICINE

## 2022-06-20 RX ORDER — GLIPIZIDE 5 MG/1
5 TABLET ORAL
Qty: 60 TABLET | Refills: 0 | Status: SHIPPED | OUTPATIENT
Start: 2022-06-20 | End: 2022-07-22

## 2022-06-20 RX ORDER — FLASH GLUCOSE SCANNING READER
1 EACH MISCELLANEOUS ONCE
Qty: 1 EACH | Refills: 0 | Status: SHIPPED | OUTPATIENT
Start: 2022-06-20 | End: 2022-06-20

## 2022-06-20 RX ORDER — AMLODIPINE BESYLATE 5 MG/1
7.5 TABLET ORAL DAILY
Qty: 135 TABLET | Refills: 3 | Status: SHIPPED | OUTPATIENT
Start: 2022-06-20 | End: 2023-07-28

## 2022-06-20 RX ORDER — DULAGLUTIDE 3 MG/.5ML
3 INJECTION, SOLUTION SUBCUTANEOUS
Qty: 6 ML | Refills: 0 | Status: SHIPPED | OUTPATIENT
Start: 2022-06-20 | End: 2022-09-12

## 2022-06-20 RX ORDER — FLASH GLUCOSE SENSOR
1 KIT MISCELLANEOUS
Qty: 2 EACH | Refills: 5 | Status: SHIPPED | OUTPATIENT
Start: 2022-06-20 | End: 2022-07-14

## 2022-06-20 ASSESSMENT — PAIN SCALES - GENERAL: PAINLEVEL: NO PAIN (0)

## 2022-06-20 NOTE — PROGRESS NOTES
Assessment & Plan     (I10) Essential hypertension, benign  Comment: at goal     Refill: amLODIPine (NORVASC) 5 MG tablet            (E11.40,  Z79.4) Type 2 diabetes mellitus with diabetic neuropathy, with long-term current use of insulin (H)    Start:  glipiZIDE (GLUCOTROL) 5 MG tablet,     Comment: lantus: start injection qhs, start with 15 units and titrate up by every 2 units to achieve BG average of < 150   Plan: Continue current management -   Dulaglutide (TRULICITY) 3 MG/0.5ML SOPN,          Continuous         Blood Gluc  (FREESTYLE LARY 14 DAY         READER) TEA, Continuous Blood Gluc Sensor         (FREESTYLE LARY 14 DAY SENSOR) MISC          Follow diabetic diet.         I spent a total of 77 minutes on the day of the visit.   Time spent doing chart review, history and exam, documentation and further activities per the note             Return in about 2 weeks (around 7/4/2022) for Follow up, using a video visit.    Jus Rodney MD  [facility]    Cathleen Cruz is a 71 year old comes in follow up of Diabetes, recording high blood glucose and HgA1c.  She is on Trulicity 3 mg, metformin 1000 mg twice daily, Lantus 13 units  A1c of 11.5 as of 6/16/2022      History of Present Illness       Diabetes:   She presents for follow up of diabetes.  She is checking home blood glucose one time daily. She checks blood glucose before meals.  Blood glucose is sometimes over 200 and sometimes under 70. She is aware of hypoglycemia symptoms including shakiness, weakness, lethargy and other. She is concerned about blood sugar frequently over 200 and low blood sugar, several less than 70 in the past few weeks. She is having burning in feet, excessive thirst and weight loss.         Hypertension: She presents for follow up of hypertension.  She does not check blood pressure  regularly outside of the clinic. Outside blood pressures have been over 140/90. She does not follow a low  "salt diet.       Review of Systems         Objective    /68   Pulse 74   Temp 98.4  F (36.9  C)   Resp 18   Ht 1.645 m (5' 4.75\")   Wt 69.9 kg (154 lb)   SpO2 97%   Breastfeeding No   BMI 25.83 kg/m    Body mass index is 25.83 kg/m .  Physical Exam                       .  ..  "

## 2022-06-20 NOTE — TELEPHONE ENCOUNTER
Patient calling to get clarification on prescriptions she just received today.    Patient is wondering if she continues to take the 13 units of insulin with the glipiZIDE (GLUCOTROL) 5 MG tablet or does she just start taking the glipiZIDE (GLUCOTROL) 5 MG tablet?    Patient would like a call back by the end of the day today. Patient has to take glipiZIDE (GLUCOTROL) 5 MG tablet and possible insulin at dinner time and doesn't want to mess up.    Patient had an appt today 6/20/2022    Call Back   [phone]

## 2022-06-21 NOTE — TELEPHONE ENCOUNTER
I Called patient back, covering for PCP today    Patient was a bit confused as to what medication she should be taking.  She thought that she was supposed to discontinue the insulin now that she is on the glipizide    The note from yesterday is not done but based on her numbers, last A1c was 11.5- would assume that patient should continue Lantus, Trulicity, metformin and add glipizide to the regimen.  Just in case that is not what the PCP wanted, I will leave this message in his inbox to review.    Patient verbalized understanding. Will take her lantus tonight.

## 2022-06-21 NOTE — TELEPHONE ENCOUNTER
Patient calling to inquire an answer from a question asked yesterday.    Patient skipped insulin shot yesterday so they wouldn't mess anything up but never heard so is now worried that this will be possibly day two.    Patient looking to get a call back ASAP.    [phone]

## 2022-06-22 NOTE — TELEPHONE ENCOUNTER
Writer called and spoke with the patient and gave her the instructions below, she verbalized understanding and thanked writer for he call.

## 2022-06-22 NOTE — TELEPHONE ENCOUNTER
pls call patient with the following comment;     Start:  glipiZIDE (GLUCOTROL) 5 MG tablet, bid      Comment: lantus: start injection qhs, start with 15 units and titrate up by every 2 units to achieve BG average of < 150   Plan: Continue current management -   Dulaglutide (TRULICITY) 3 MG/0.5ML ,  metformin as is                 Follow diabetic diet   No

## 2022-06-27 ENCOUNTER — TRANSFERRED RECORDS (OUTPATIENT)
Dept: HEALTH INFORMATION MANAGEMENT | Facility: CLINIC | Age: 72
End: 2022-06-27

## 2022-06-27 LAB — RETINOPATHY: POSITIVE

## 2022-06-29 ENCOUNTER — TELEPHONE (OUTPATIENT)
Dept: FAMILY MEDICINE | Facility: CLINIC | Age: 72
End: 2022-06-29

## 2022-06-29 DIAGNOSIS — Z79.4 TYPE 2 DIABETES MELLITUS WITH DIABETIC NEUROPATHY, WITH LONG-TERM CURRENT USE OF INSULIN (H): Primary | ICD-10-CM

## 2022-06-29 DIAGNOSIS — E11.40 TYPE 2 DIABETES MELLITUS WITH DIABETIC NEUROPATHY, WITH LONG-TERM CURRENT USE OF INSULIN (H): Primary | ICD-10-CM

## 2022-06-29 NOTE — TELEPHONE ENCOUNTER
Reason for Call:  [facility]     Freestyle Rene [place] 14 day is no longer manufactured requesting RX is updated to reflect:  Freestyle Rene 2 system and Freestyle Rene 2 sensors, change every 14 days, 90 day supply with 3 refills.    Do you use a [facility]? [place] of the pharmacy and phone number for the current request:  [facility]      Call taken on 6/29/2022 at 3:42 PM by Lexus Warner

## 2022-07-05 DIAGNOSIS — M85.89 OSTEOPENIA OF MULTIPLE SITES: ICD-10-CM

## 2022-07-08 RX ORDER — ALENDRONATE SODIUM 70 MG/1
70 TABLET ORAL
Qty: 12 TABLET | Refills: 2 | Status: SHIPPED | OUTPATIENT
Start: 2022-07-08 | End: 2023-03-20

## 2022-07-11 DIAGNOSIS — E11.40 TYPE 2 DIABETES MELLITUS WITH DIABETIC NEUROPATHY, WITH LONG-TERM CURRENT USE OF INSULIN (H): ICD-10-CM

## 2022-07-11 DIAGNOSIS — Z79.4 TYPE 2 DIABETES MELLITUS WITH DIABETIC NEUROPATHY, WITH LONG-TERM CURRENT USE OF INSULIN (H): ICD-10-CM

## 2022-07-11 NOTE — TELEPHONE ENCOUNTER
Reason for Call:  Other prescription    Detailed comments:     Alyssa from [employer] is calling to have provider verify current directions for:    Continuous Blood Gluc Sensor (FREESTYLE LARY 2 SENSOR) MISC 10 each 3 7/1/2022 7/1/2022 --       Please fax to [phone]    Phone Number Patient can be reached at: Other phone number:      [facility]: [phone]  Reference # : [id number]      Best Time: ANY    Can we leave a detailed message on this number? YES    Call taken on 7/11/2022 at 11:53 AM by Parker Samuel

## 2022-07-14 RX ORDER — FLASH GLUCOSE SENSOR
1 KIT MISCELLANEOUS
Qty: 10 EACH | Refills: 3 | Status: SHIPPED | OUTPATIENT
Start: 2022-07-14 | End: 2023-04-05 | Stop reason: ALTCHOICE

## 2022-07-21 ENCOUNTER — VIRTUAL VISIT (OUTPATIENT)
Dept: FAMILY MEDICINE | Facility: CLINIC | Age: 72
End: 2022-07-21
Payer: COMMERCIAL

## 2022-07-21 DIAGNOSIS — E11.40 TYPE 2 DIABETES MELLITUS WITH DIABETIC NEUROPATHY, WITH LONG-TERM CURRENT USE OF INSULIN (H): ICD-10-CM

## 2022-07-21 DIAGNOSIS — Z79.4 TYPE 2 DIABETES MELLITUS WITH DIABETIC NEUROPATHY, WITH LONG-TERM CURRENT USE OF INSULIN (H): ICD-10-CM

## 2022-07-21 DIAGNOSIS — E78.49 OTHER HYPERLIPIDEMIA: ICD-10-CM

## 2022-07-21 PROCEDURE — 99214 OFFICE O/P EST MOD 30 MIN: CPT | Mod: 95 | Performed by: FAMILY MEDICINE

## 2022-07-21 RX ORDER — GABAPENTIN 300 MG/1
300 CAPSULE ORAL AT BEDTIME
Qty: 90 CAPSULE | Refills: 3 | Status: SHIPPED | OUTPATIENT
Start: 2022-07-21 | End: 2023-05-22

## 2022-07-21 RX ORDER — EZETIMIBE 10 MG/1
10 TABLET ORAL DAILY
Qty: 90 TABLET | Refills: 3 | Status: SHIPPED | OUTPATIENT
Start: 2022-07-21 | End: 2023-07-28

## 2022-07-21 NOTE — PROGRESS NOTES
Nancy is a 72 year old who is being evaluated via a billable video visit.      How would you like to obtain your AVS? MyChart  If the video visit is dropped, the invitation should be resent by: Text to cell phone: [phone]   Will anyone else be joining your video visit? No        Assessment & Plan     (E11.40,  Z79.4) Type 2 diabetes mellitus with diabetic neuropathy, with long-term current use of insulin (H)  Comment: encouraged more BG readings   Goal to keep BG<150     Plan:   rehceck HgA1c     Refill: gabapentin (NEURONTIN) 300 MG capsule,         metFORMIN (GLUCOPHAGE) 1000 MG tablet,             (E78.49) Other hyperlipidemia  Comment: Refill  Plan: ezetimibe (ZETIA) 10 MG tablet, Lipid panel         reflex to direct LDL Fasting, ALT                               No follow-ups on file.    Jus Rodney MD  [facility]    Subjective   Nancy is a 72 year old presenting for med check and follow-up of diabetes, with starting of glipizide 5 mg twice a day.  noting that the blood sugars might be slightly improved  She has kept the lantus at 15, with having a few lows. She has only been checking the morning fasting BG's. She continues with Trulicity 3 mg, and metformin 1000 mg bid   She needs more refills of glipizide if continuing to take.         Review of Systems         Objective           Vitals:  No vitals were obtained today due to virtual visit.    Physical Exam   GENERAL: Healthy, alert and no distress                Video-Visit Details    Video Start Time: 9:27 AM    Type of service:  Video Visit    Video End Time:9:40 AM    Originating Location (pt. Location): Home    Distant Location (provider location):  [facility]     Platform used for Video Visit: [employer]    .  ..

## 2022-07-25 RX ORDER — GLIPIZIDE 5 MG/1
5 TABLET ORAL
Qty: 180 TABLET | Refills: 1 | Status: SHIPPED | OUTPATIENT
Start: 2022-07-25 | End: 2022-12-01

## 2022-07-26 ENCOUNTER — LAB (OUTPATIENT)
Dept: LAB | Facility: CLINIC | Age: 72
End: 2022-07-26
Payer: COMMERCIAL

## 2022-07-26 DIAGNOSIS — E78.49 OTHER HYPERLIPIDEMIA: ICD-10-CM

## 2022-07-26 DIAGNOSIS — E11.40 TYPE 2 DIABETES MELLITUS WITH DIABETIC NEUROPATHY, WITH LONG-TERM CURRENT USE OF INSULIN (H): ICD-10-CM

## 2022-07-26 DIAGNOSIS — Z79.4 TYPE 2 DIABETES MELLITUS WITH DIABETIC NEUROPATHY, WITH LONG-TERM CURRENT USE OF INSULIN (H): ICD-10-CM

## 2022-07-26 LAB
ALT SERPL W P-5'-P-CCNC: 22 U/L (ref 10–35)
HBA1C MFR BLD: 11.3 % (ref 0–5.6)

## 2022-07-26 PROCEDURE — 83036 HEMOGLOBIN GLYCOSYLATED A1C: CPT

## 2022-07-26 PROCEDURE — 36415 COLL VENOUS BLD VENIPUNCTURE: CPT

## 2022-07-26 PROCEDURE — 84460 ALANINE AMINO (ALT) (SGPT): CPT

## 2022-08-09 ENCOUNTER — TELEPHONE (OUTPATIENT)
Dept: FAMILY MEDICINE | Facility: CLINIC | Age: 72
End: 2022-08-09

## 2022-08-09 NOTE — TELEPHONE ENCOUNTER
Patient dropped off [employer] form to be filled out by Dr. MITCHELL Once completed please mail out to address listed on form. Envelope is attached. Form placed into providers mailbox.

## 2022-08-12 ENCOUNTER — ANCILLARY PROCEDURE (OUTPATIENT)
Dept: MAMMOGRAPHY | Facility: CLINIC | Age: 72
End: 2022-08-12
Attending: FAMILY MEDICINE
Payer: COMMERCIAL

## 2022-08-12 DIAGNOSIS — Z12.31 VISIT FOR SCREENING MAMMOGRAM: ICD-10-CM

## 2022-08-12 DIAGNOSIS — I10 ESSENTIAL HYPERTENSION, BENIGN: ICD-10-CM

## 2022-08-12 PROCEDURE — 77067 SCR MAMMO BI INCL CAD: CPT

## 2022-08-12 RX ORDER — METOPROLOL SUCCINATE 25 MG/1
75 TABLET, EXTENDED RELEASE ORAL DAILY
Qty: 270 TABLET | Refills: 2 | Status: SHIPPED | OUTPATIENT
Start: 2022-08-12 | End: 2023-02-13

## 2022-08-12 NOTE — TELEPHONE ENCOUNTER
"Last Written Prescription Date:  4/1/22  Last Fill Quantity: 270,  # refills: 0   Last office visit provider:  7/21/22     Requested Prescriptions   Pending Prescriptions Disp Refills     metoprolol succinate ER (TOPROL XL) 25 MG 24 hr tablet 270 tablet 0     Sig: Take 3 tablets (75 mg) by mouth daily       Beta-Blockers Protocol Passed - 8/12/2022  8:43 AM        Passed - Blood pressure under 140/90 in past 12 months     BP Readings from Last 3 Encounters:   06/20/22 128/68   04/01/22 (!) 150/85   08/16/21 (!) 143/80                 Passed - Patient is age 6 or older        Passed - Recent (12 mo) or future (30 days) visit within the authorizing provider's specialty     Patient has had an office visit with the authorizing provider or a provider within the authorizing providers department within the previous 12 mos or has a future within next 30 days. See \"Patient Info\" tab in inbasket, or \"Choose Columns\" in Meds & Orders section of the refill encounter.              Passed - Medication is active on med list             [place], Lexus, RN 08/12/22 6:35 PM  "

## 2022-08-12 NOTE — TELEPHONE ENCOUNTER
Medication Question or Refill    Contacts       Type Contact Phone/Fax    08/12/2022 08:31 AM CDT Phone (Incoming) Nancy Hughes AKIN (Self) [phone] (H)          What medication are you calling about (include dose and sig)?: Metoprolol succinate ER (TOPROL-XL) 25 mg 24 hr tablet    Per patient PCP increased dose.  [facility] needs new RX for new dose - 90 day quantity with refills    Controlled Substance Agreement on file:   CSA -- Patient Level:    CSA: None found at the patient level.       Who prescribed the medication?: PCP    Do you need a refill? Yes:     When did you use the medication last? today    Patient offered an appointment? No - Pt completed appt on 07/21/22 within 30 days    Do you have any questions or concerns?  No    Preferred Pharmacy:   [facility] - [place], [place] - [address] [place] [id number]  Phone: [phone] Fax: [phone]

## 2022-09-09 DIAGNOSIS — E11.40 TYPE 2 DIABETES MELLITUS WITH DIABETIC NEUROPATHY, WITH LONG-TERM CURRENT USE OF INSULIN (H): ICD-10-CM

## 2022-09-09 DIAGNOSIS — Z79.4 TYPE 2 DIABETES MELLITUS WITH DIABETIC NEUROPATHY, WITH LONG-TERM CURRENT USE OF INSULIN (H): ICD-10-CM

## 2022-09-09 NOTE — TELEPHONE ENCOUNTER
Patient calling to get a medication refill on medications attached.    Patient also asking if she should continue the original prescription or did the provider want to up that?    Call back  [phone]

## 2022-09-12 RX ORDER — DULAGLUTIDE 3 MG/.5ML
3 INJECTION, SOLUTION SUBCUTANEOUS
Qty: 6 ML | Refills: 0 | Status: SHIPPED | OUTPATIENT
Start: 2022-09-12 | End: 2022-11-14

## 2022-09-19 ENCOUNTER — TRANSFERRED RECORDS (OUTPATIENT)
Dept: HEALTH INFORMATION MANAGEMENT | Facility: CLINIC | Age: 72
End: 2022-09-19

## 2022-09-23 ENCOUNTER — MYC MEDICAL ADVICE (OUTPATIENT)
Dept: FAMILY MEDICINE | Facility: CLINIC | Age: 72
End: 2022-09-23

## 2022-09-23 DIAGNOSIS — E11.40 TYPE 2 DIABETES MELLITUS WITH DIABETIC NEUROPATHY, WITH LONG-TERM CURRENT USE OF INSULIN (H): ICD-10-CM

## 2022-09-23 DIAGNOSIS — Z79.4 TYPE 2 DIABETES MELLITUS WITH DIABETIC NEUROPATHY, WITH LONG-TERM CURRENT USE OF INSULIN (H): ICD-10-CM

## 2022-09-26 RX ORDER — GLUCOSAMINE HCL/CHONDROITIN SU 500-400 MG
CAPSULE ORAL
Qty: 300 STRIP | Refills: 0 | Status: SHIPPED | OUTPATIENT
Start: 2022-09-26 | End: 2022-12-27

## 2022-09-26 NOTE — TELEPHONE ENCOUNTER
"Last Written Prescription Date:  5/17/21  Last Fill Quantity: 300,  # refills: 3   Last office visit provider:  7/21/22     Requested Prescriptions   Pending Prescriptions Disp Refills     Glucose Blood (BLOOD GLUCOSE TEST STRIPS) STRP 300 strip 3       Diabetic Supplies Protocol Passed - 9/26/2022  9:15 AM        Passed - Medication is active on med list        Passed - Patient is 18 years of age or older        Passed - Recent (6 mo) or future (30 days) visit within the authorizing provider's specialty     Patient had office visit in the last 6 months or has a visit in the next 30 days with authorizing provider.  See \"Patient Info\" tab in inbasket, or \"Choose Columns\" in Meds & Orders section of the refill encounter.                 Lexus Mendoza RN 09/26/22 5:43 PM  "

## 2022-10-05 ENCOUNTER — VIRTUAL VISIT (OUTPATIENT)
Dept: FAMILY MEDICINE | Facility: OTHER | Age: 72
End: 2022-10-05
Payer: COMMERCIAL

## 2022-10-05 DIAGNOSIS — U07.1 INFECTION DUE TO 2019 NOVEL CORONAVIRUS: Primary | ICD-10-CM

## 2022-10-05 PROCEDURE — 99441 PR PHYSICIAN TELEPHONE EVALUATION 5-10 MIN: CPT | Mod: CS | Performed by: PHYSICIAN ASSISTANT

## 2022-10-05 NOTE — PATIENT INSTRUCTIONS
COVID-19 Outpatient Treatments  Your care team can help you find the best treatments for COVID-19. Talk to a health care provider or refer to the FDA medicine fact sheets below.  Important: You CAN'T have molnupiravir or Paxlovid if you are starting the medicine more than 5 days after your symptoms have started.  Paxlovid: https://www.fda.gov/media/143005/download  Molnupiravir: https://www.fda.gov/media/592583/download  Monoclonal antibodies: https://combatcovid.hhs.gov/what-are-monoclonal-antibodies  Paxlovid (nimatrelvir and ritonavir)  How it works  Two medicines (nirmatrelvir and ritonavir) are taken together. They stop the virus from growing. Less amount of virus is easier for your body to fight.  Benefits  Lowers risk of a hospital stay or death from COVID-19.  How to take  Medicine comes in a daily container with both medicine tablets. Take by mouth twice daily (once in the morning, once at night) for 5 days.  The number of tablets to take varies by patient.  Don't chew or break capsules. Swallow whole.  When to take  Take as soon as possible after positive COVID-19 test result, and within 5 days of your first symptoms.  Who can take it  Patients must be 12 years or older, weigh at least 88 pounds, and have tested positive for COVID-19. This is the preferred treatment for pregnant patients.  Possible side effects  Can cause altered sense of taste, diarrhea (loose, watery stools), high blood pressure, muscle aches.  Medicine conflicts  Some medicines may conflict with Paxlovid and may cause serious side effects.  Tell your care team about all the medicines you take, including prescription and over-the-counter medicines, vitamins and herbal supplements.  Your provider will review your medicines to make sure that you can safely take Paxlovid.  Cautions  Paxlovid is not advised for patients with severe kidney or liver disease. If you have kidney or liver problems, the dose may need to be changed.  If you are  pregnant or breastfeeding, talk to your care team about your options.  If you are sexually active, use trusted birth control while taking Paxlovid.  Molnupiravir  How it works  Stops the virus from growing. Less amount of virus is easier for your body to fight.  Benefits  Lowers risk of a hospital stay or death from COVID-19.  How to take  Take 4 capsules by mouth every 12 hours (4 in the morning and 4 at night) for 5 days. Don't chew or break capsules. Swallow whole.  When to take  Take as soon as possible after positive COVID-19 test result, and within 5 days of your first symptoms.  Who can take it  Patients must be 18 years or older and have tested positive for COVID-19.  Possible side effects  Diarrhea (loose, watery stools), nausea (feeling sick to your stomach), dizziness, headaches.  Medicine conflicts  Right now, there are no known conflicts with other drugs. But tell your care team all medicines you take.  Cautions  This is not advised for patients who are pregnant.  Patients who could become pregnant should use trusted birth control until 4 days after their last dose.  Sexually active people of any gender should use trusted birth control for 3 months after their last dose.  Monoclonal antibodies  How it works  Monoclonal antibodies can detect pieces of the COVID virus and stop it from infecting your cells.  Benefits  Lowers risk of a hospital stay or death from COVID-19. Monoclonal antibodies are known to work well against the omicron variant.  How it is given to you  You will receive the treatment either by an infusion through your vein (IV) or shots.  When to take  Get as soon as possible after you test positive for COVID-19, and within 7 days of your first symptoms.  Who can take it  Patients must be 12 years or older, weigh at least 88 pounds and have tested positive for COVID-19.  Possible side effects  Fever, chills, diarrhea (loose, watery stools), dizziness, itchiness and rash.  More serious side  effects include: fever, difficulty breathing, low oxygen level in your blood, chills, tiredness, fast or slow heart rate, chest discomfort or pain, weakness, confusion, nausea, headache, shortness of breath, low or high blood pressure, wheezing, swelling of your lips, face, or throat, rash including hives, itching, muscle aches, dizziness, feeling faint and sweating.  If you receive an IV, you may have brief pain, bleeding and bruising of the skin, soreness, swelling and possible infection at the place where you get the IV needle.  Medicine conflicts  Please tell you care team other medicines you take so they can assess if there are any conflicts.  Cautions  Your doctor will talk with you about risks and benefits of this treatment and will help choose the best option for you.  For informational purposes only. Not to replace the advice of your health care provider.  Copyright   2022 [employer]. All rights reserved. Clinically reviewed by Sushila Puga. [employer] [id number] - 08/22.    Instructions for Patients      What are the symptoms of COVID-19?  Symptoms can include fever, cough, shortness of breath, chills, headache, muscle pain sore throat, fatigue, runny or stuffy nose, and loss of taste and smell. Other less common symptoms include nausea, vomiting, or diarrhea (watery stools).    Know when to call 911. Emergency warning signs include:  Trouble breathing or shortness of breath  Pain or pressure in the chest that doesn't go away  Feeling confused like you haven't felt before, or not being able to wake up  Bluish-colored lips or face    How can I take care of myself?  Get lots of rest. Drink extra fluids (unless a doctor has told you not to).  Take Tylenol (acetaminophen) for fever or pain. If you have liver or kidney problems, ask your family doctor if it's okay to take Tylenol   Adults:   650 mg (two 325 mg pills or tablets) every 4 to 6 hours, or...   1,000 mg (two 500 mg pills or tablets) every  8 hours as needed.  Note: Don't take more than 3,000 mg in one day. Acetaminophen is found in many medicines (both prescribed and over the counter). Read all labels to be sure you don't take too much.  For children, check the Tylenol bottle for the right dose. The dose is based on the child's age or weight.  Take over the counter medicines for your symptoms as needed. Talk to your pharmacist.  If you have other health problems (like cancer, heart failure, an organ transplant, or severe kidney disease): Call your specialty clinic if you don't feel better in the next 2 days.    Where can I get more information?   [employer] COVID-19 Resource Hub: www.[employer].org/covid19/   CDC Quarantine & Isolation: https://www.cdc.gov/coronavirus/2019-ncov/your-health/quarantine-isolation.html   CDC - What to Do If You're Sick: https://www.cdc.gov/coronavirus/2019-ncov/if-you-are-sick/index.html  [facility] clinical trials (COVID-19 research studies): clinicalaffairs.[facility]/umn-clinical-trials  [place] Department of Health COVID-19 Public Hotline: 1-911.589.5105

## 2022-10-05 NOTE — PROGRESS NOTES
Nancy is a 72 year old who is being evaluated via a billable telephone visit.      What phone number would you like to be contacted at? [phone]  How would you like to obtain your AVS? MyChart    Assessment & Plan       ICD-10-CM    1. Infection due to 2019 novel coronavirus  U07.1        Given her symptoms and risk factors, I recommend treatment with Paxlovid. Common side effects discussed including the risk of rebound COVID.  We discussed the potential interaction with amlodipine and recommend she monitor her blood pressure closely. I recommend she stay well hydrated and try to rest, take Tylenol/ibuprofen as needed along with vitamin C, vitamin D and zinc. If symptoms are not improving or are worsening, she should contact the clinic or be seen right away. Quarantine guidelines discussed.       Sher Ba PA-C  [facility]   Nancy is a 72 year old female, presenting for the following health issues:  No chief complaint on file.      HPI     Her  tested positive for COVID yesterday and she test positive today with symptoms starting last night including a sore throat, runny nose, fatigue, body aches and a low grade fever. She denies a significant cough, shortness of breath, or wheezing. She is fully vaccinated and boosted.       Review of Systems   Constitutional, HEENT, cardiovascular, pulmonary, gi and gu systems are negative, except as otherwise noted.      Objective         Vitals:  No vitals were obtained today due to virtual visit.    Physical Exam   healthy, alert and no distress  PSYCH: Alert and oriented times 3; coherent speech, normal   rate and volume, able to articulate logical thoughts, able   to abstract reason, no tangential thoughts, no hallucinations   or delusions. Her affect is normal.   RESP: No cough, no audible wheezing, able to talk in full sentences  Remainder of exam unable to be completed due to telephone visits          Phone call  duration: 14 minutes

## 2022-10-12 ENCOUNTER — DOCUMENTATION ONLY (OUTPATIENT)
Dept: LAB | Facility: CLINIC | Age: 72
End: 2022-10-12

## 2022-10-12 DIAGNOSIS — I10 HTN (HYPERTENSION): Primary | ICD-10-CM

## 2022-10-12 NOTE — PROGRESS NOTES
Nancy AKIN Koromasnow has an upcoming lab appointment.        Future Appointments   Date Time Provider Department Center   10/27/2022  9:45 AM [facility]       The appointment note says: a1c    There is no Lab order for a1c available.  Please review and place future orders as appropriate.  If no Lab order will be placed please advise patient.            Thanks,    Erin Page

## 2022-10-13 DIAGNOSIS — Z79.4 TYPE 2 DIABETES MELLITUS WITH DIABETIC NEUROPATHY, WITH LONG-TERM CURRENT USE OF INSULIN (H): Primary | ICD-10-CM

## 2022-10-13 DIAGNOSIS — E11.40 TYPE 2 DIABETES MELLITUS WITH DIABETIC NEUROPATHY, WITH LONG-TERM CURRENT USE OF INSULIN (H): Primary | ICD-10-CM

## 2022-10-15 ENCOUNTER — NURSE TRIAGE (OUTPATIENT)
Dept: NURSING | Facility: CLINIC | Age: 72
End: 2022-10-15

## 2022-10-15 NOTE — TELEPHONE ENCOUNTER
Triage Call:    Caller: Patient  10/4-5 Patient was diagnosed with COVID.  She took Paxlovid.  After 10 days they tested and it is a faint positive line.    Patient is developing symptoms again and patient is having a faint positive line again.  Rawness in her nose  And little bit of a sore throat.        Protocol Recommended Disposition: Home care    Caller verbalized understanding of instructions and questions answered.      Kristie Charlton RN on 10/15/2022 at 9:19 AM        Reason for Disposition    [1] COVID-19 diagnosed by positive lab test (e.g., PCR, rapid self-test kit) AND [2] mild symptoms (e.g., cough, fever, others) AND [3] no complications or SOB    Additional Information    Negative: SEVERE difficulty breathing (e.g., struggling for each breath, speaks in single words)    Negative: Difficult to awaken or acting confused (e.g., disoriented, slurred speech)    Negative: Bluish (or gray) lips or face now    Negative: Shock suspected (e.g., cold/pale/clammy skin, too weak to stand, low BP, rapid pulse)    Negative: Sounds like a life-threatening emergency to the triager    Negative: SEVERE or constant chest pain or pressure  (Exception: Mild central chest pain, present only when coughing.)    Negative: MODERATE difficulty breathing (e.g., speaks in phrases, SOB even at rest, pulse 100-120)    Negative: [1] Headache AND [2] stiff neck (can't touch chin to chest)    Negative: Oxygen level (e.g., pulse oximetry) 90 percent or lower    Negative: Chest pain or pressure    Negative: Patient sounds very sick or weak to the triager    Negative: MILD difficulty breathing (e.g., minimal/no SOB at rest, SOB with walking, pulse <100)    Negative: Fever > 103 F (39.4 C)    Negative: [1] Fever > 101 F (38.3 C) AND [2] age > 60 years    Negative: [1] Fever > 100.0 F (37.8 C) AND [2] bedridden (e.g., nursing home patient, CVA, chronic illness, recovering from surgery)    Negative: HIGH RISK for severe COVID  complications (e.g., weak immune system, age > 64 years, obesity with BMI > 25, pregnant, chronic lung disease or other chronic medical condition)  (Exception: Already seen by PCP and no new or worsening symptoms.)    Negative: [1] HIGH RISK patient AND [2] influenza is widespread in the community AND [3] ONE OR MORE respiratory symptoms: cough, sore throat, runny or stuffy nose    Negative: [1] HIGH RISK patient AND [2] influenza exposure within the last 7 days AND [3] ONE OR MORE respiratory symptoms: cough, sore throat, runny or stuffy nose    Negative: Oxygen level (e.g., pulse oximetry) 91 to 94 percent    Negative: Fever present > 3 days (72 hours)    Negative: [1] Fever returns after gone for over 24 hours AND [2] symptoms worse or not improved    Negative: [1] Continuous (nonstop) coughing interferes with work or school AND [2] no improvement using cough treatment per Care Advice    Negative: [1] COVID-19 infection suspected by caller or triager AND [2] mild symptoms (cough, fever, or others) AND [3] negative COVID-19 rapid test    Negative: Cough present > 3 weeks    Protocols used: CORONAVIRUS (COVID-19) DIAGNOSED OR [id number] 1.18.2022

## 2022-10-29 ENCOUNTER — HEALTH MAINTENANCE LETTER (OUTPATIENT)
Age: 72
End: 2022-10-29

## 2022-11-11 ENCOUNTER — MYC MEDICAL ADVICE (OUTPATIENT)
Dept: FAMILY MEDICINE | Facility: CLINIC | Age: 72
End: 2022-11-11

## 2022-11-11 DIAGNOSIS — Z79.4 TYPE 2 DIABETES MELLITUS WITH DIABETIC NEUROPATHY, WITH LONG-TERM CURRENT USE OF INSULIN (H): ICD-10-CM

## 2022-11-11 DIAGNOSIS — E11.40 TYPE 2 DIABETES MELLITUS WITH DIABETIC NEUROPATHY, WITH LONG-TERM CURRENT USE OF INSULIN (H): ICD-10-CM

## 2022-11-11 NOTE — TELEPHONE ENCOUNTER
Original dosage sachin'd up. Patient wondering about increase    Last fill 9/12/22    Last seen 7/21/22    (E11.40,  Z79.4) Type 2 diabetes mellitus with diabetic neuropathy, with long-term current use of insulin (H)  Comment: encouraged more BG readings   Goal to keep BG<150      Plan:   rehceck HgA1c      Refill: gabapentin (NEURONTIN) 300 MG capsule,         metFORMIN (GLUCOPHAGE) 1000 MG tablet,     Next appointment is 12/01/22

## 2022-11-14 RX ORDER — DULAGLUTIDE 3 MG/.5ML
3 INJECTION, SOLUTION SUBCUTANEOUS
Qty: 2 ML | Refills: 0 | Status: SHIPPED | OUTPATIENT
Start: 2022-11-14 | End: 2022-12-01

## 2022-11-25 ASSESSMENT — ENCOUNTER SYMPTOMS
PALPITATIONS: 0
DYSURIA: 0
EYE PAIN: 0
WEAKNESS: 0
SORE THROAT: 0
ABDOMINAL PAIN: 0
HEMATURIA: 0
HEADACHES: 0
MYALGIAS: 0
SHORTNESS OF BREATH: 0
NERVOUS/ANXIOUS: 0
FEVER: 0
PARESTHESIAS: 0
DIZZINESS: 0
CHILLS: 0
HEMATOCHEZIA: 0
COUGH: 0
DIARRHEA: 0
JOINT SWELLING: 0
FREQUENCY: 0
CONSTIPATION: 0
BREAST MASS: 0
ARTHRALGIAS: 0
NAUSEA: 0
HEARTBURN: 1

## 2022-11-25 ASSESSMENT — ACTIVITIES OF DAILY LIVING (ADL): CURRENT_FUNCTION: NO ASSISTANCE NEEDED

## 2022-12-01 ENCOUNTER — OFFICE VISIT (OUTPATIENT)
Dept: FAMILY MEDICINE | Facility: CLINIC | Age: 72
End: 2022-12-01
Payer: COMMERCIAL

## 2022-12-01 VITALS
HEART RATE: 60 BPM | TEMPERATURE: 98.8 F | DIASTOLIC BLOOD PRESSURE: 74 MMHG | SYSTOLIC BLOOD PRESSURE: 132 MMHG | WEIGHT: 147 LBS | HEIGHT: 64 IN | BODY MASS INDEX: 25.1 KG/M2

## 2022-12-01 DIAGNOSIS — E11.40 TYPE 2 DIABETES MELLITUS WITH DIABETIC NEUROPATHY, WITH LONG-TERM CURRENT USE OF INSULIN (H): ICD-10-CM

## 2022-12-01 DIAGNOSIS — E78.5 HYPERLIPIDEMIA WITH TARGET LDL LESS THAN 100: ICD-10-CM

## 2022-12-01 DIAGNOSIS — Z12.11 SPECIAL SCREENING FOR MALIGNANT NEOPLASMS, COLON: ICD-10-CM

## 2022-12-01 DIAGNOSIS — I10 ESSENTIAL HYPERTENSION, BENIGN: ICD-10-CM

## 2022-12-01 DIAGNOSIS — Z12.31 VISIT FOR SCREENING MAMMOGRAM: ICD-10-CM

## 2022-12-01 DIAGNOSIS — Z00.00 ENCOUNTER FOR MEDICARE ANNUAL WELLNESS EXAM: Primary | ICD-10-CM

## 2022-12-01 DIAGNOSIS — Z79.4 TYPE 2 DIABETES MELLITUS WITH DIABETIC NEUROPATHY, WITH LONG-TERM CURRENT USE OF INSULIN (H): ICD-10-CM

## 2022-12-01 LAB
ALBUMIN SERPL BCG-MCNC: 4.5 G/DL (ref 3.5–5.2)
ALP SERPL-CCNC: 96 U/L (ref 35–104)
ALT SERPL W P-5'-P-CCNC: 25 U/L (ref 10–35)
ANION GAP SERPL CALCULATED.3IONS-SCNC: 15 MMOL/L (ref 7–15)
AST SERPL W P-5'-P-CCNC: 29 U/L (ref 10–35)
BILIRUB SERPL-MCNC: 0.3 MG/DL
BUN SERPL-MCNC: 8.8 MG/DL (ref 8–23)
CALCIUM SERPL-MCNC: 10.4 MG/DL (ref 8.8–10.2)
CHLORIDE SERPL-SCNC: 96 MMOL/L (ref 98–107)
CHOLEST SERPL-MCNC: 170 MG/DL
CREAT SERPL-MCNC: 0.49 MG/DL (ref 0.51–0.95)
DEPRECATED HCO3 PLAS-SCNC: 26 MMOL/L (ref 22–29)
GFR SERPL CREATININE-BSD FRML MDRD: >90 ML/MIN/1.73M2
GLUCOSE SERPL-MCNC: 225 MG/DL (ref 70–99)
HBA1C MFR BLD: 11.2 % (ref 0–5.6)
HDLC SERPL-MCNC: 77 MG/DL
LDLC SERPL CALC-MCNC: 79 MG/DL
NONHDLC SERPL-MCNC: 93 MG/DL
POTASSIUM SERPL-SCNC: 4.6 MMOL/L (ref 3.4–5.3)
PROT SERPL-MCNC: 6.8 G/DL (ref 6.4–8.3)
SODIUM SERPL-SCNC: 137 MMOL/L (ref 136–145)
TRIGL SERPL-MCNC: 69 MG/DL

## 2022-12-01 PROCEDURE — 83036 HEMOGLOBIN GLYCOSYLATED A1C: CPT | Performed by: FAMILY MEDICINE

## 2022-12-01 PROCEDURE — G0439 PPPS, SUBSEQ VISIT: HCPCS | Performed by: FAMILY MEDICINE

## 2022-12-01 PROCEDURE — 99214 OFFICE O/P EST MOD 30 MIN: CPT | Mod: 25 | Performed by: FAMILY MEDICINE

## 2022-12-01 PROCEDURE — 80053 COMPREHEN METABOLIC PANEL: CPT | Performed by: FAMILY MEDICINE

## 2022-12-01 PROCEDURE — 80061 LIPID PANEL: CPT | Performed by: FAMILY MEDICINE

## 2022-12-01 PROCEDURE — 36415 COLL VENOUS BLD VENIPUNCTURE: CPT | Performed by: FAMILY MEDICINE

## 2022-12-01 RX ORDER — DULAGLUTIDE 3 MG/.5ML
3 INJECTION, SOLUTION SUBCUTANEOUS
Qty: 6 ML | Refills: 3 | Status: SHIPPED | OUTPATIENT
Start: 2022-12-01 | End: 2023-03-13

## 2022-12-01 RX ORDER — GLIPIZIDE 5 MG/1
TABLET ORAL
Qty: 270 TABLET | Refills: 0 | Status: SHIPPED | OUTPATIENT
Start: 2022-12-01 | End: 2023-02-13

## 2022-12-01 RX ORDER — INSULIN GLARGINE 100 [IU]/ML
13 INJECTION, SOLUTION SUBCUTANEOUS AT BEDTIME
Qty: 11.7 ML | Refills: 1 | Status: SHIPPED | OUTPATIENT
Start: 2022-12-01 | End: 2023-12-21

## 2022-12-01 ASSESSMENT — ACTIVITIES OF DAILY LIVING (ADL): CURRENT_FUNCTION: NO ASSISTANCE NEEDED

## 2022-12-01 NOTE — PATIENT INSTRUCTIONS
Patient Education   Personalized Prevention Plan  You are due for the preventive services outlined below.  Your care team is available to assist you in scheduling these services.  If you have already completed any of these items, please share that information with your care team to update in your medical record.  Health Maintenance Due   Topic Date Due     LUNG CANCER SCREENING  Never done     Diabetic Foot Exam  07/09/2022     Basic Metabolic Panel  09/13/2022     Your Health Risk Assessment indicates you feel you are not in good health    A healthy lifestyle helps keep the body fit and the mind alert. It helps protect you from disease, helps you fight disease, and helps prevent chronic disease (disease that doesn't go away) from getting worse. This is important as you get older and begin to notice twinges in muscles and joints and a decline in the strength and stamina you once took for granted. A healthy lifestyle includes good healthcare, good nutrition, weight control, recreation, and regular exercise. Avoid harmful substances and do what you can to keep safe. Another part of a healthy lifestyle is stay mentally active and socially involved.    Good healthcare     Have a wellness visit every year.     If you have new symptoms, let us know right away. Don't wait until the next checkup.     Take medicines exactly as prescribed and keep your medicines in a safe place. Tell us if your medicine causes problems.   Healthy diet and weight control     Eat 3 or 4 small, nutritious, low-fat, high-fiber meals a day. Include a variety of fruits, vegetables, and whole-grain foods.     Make sure you get enough calcium in your diet. Calcium, vitamin D, and exercise help prevent osteoporosis (bone thinning).     If you live alone, try eating with others when you can. That way you get a good meal and have company while you eat it.     Try to keep a healthy weight. If you eat more calories than your body uses for energy, it  will be stored as fat and you will gain weight.     Recreation   Recreation is not limited to sports and team events. It includes any activity that provides relaxation, interest, enjoyment, and exercise. Recreation provides an outlet for physical, mental, and social energy. It can give a sense of worth and achievement. It can help you stay healthy.    Mental Exercise and Social Involvement  Mental and emotional health is as important as physical health. Keep in touch with friends and family. Stay as active as possible. Continue to learn and challenge yourself.   Things you can do to stay mentally active are:    Learn something new, like a foreign language or musical instrument.     Play SCRABBLE or do crossword puzzles. If you cannot find people to play these games with you at home, you can play them with others on your computer through the Internet.     Join a games club--anything from card games to chess or checkers or lawn bowling.     Start a new hobby.     Go back to school.     Volunteer.     Read.   Keep up with world events.    Signs of Hearing Loss      Hearing much better with one ear can be a sign of hearing loss.   Hearing loss is a problem shared by many people. In fact, it is one of the most common health problems, particularly as people age. Most people age 65 and older have some hearing loss. By age 80, almost everyone does. Hearing loss often occurs slowly over the years. So you may not realize your hearing has gotten worse.  Have your hearing checked  Call your healthcare provider if you:    Have to strain to hear normal conversation    Have to watch other people s faces very carefully to follow what they re saying    Need to ask people to repeat what they ve said    Often misunderstand what people are saying    Turn the volume of the television or radio up so high that others complain    Feel that people are mumbling when they re talking to you    Find that the effort to hear leaves you feeling  tired and irritated    Notice, when using the phone, that you hear better with one ear than the other  [employer] last reviewed this educational content on 1/1/2020 2000-2021 [employer]. All rights reserved. This information is not intended as a substitute for professional medical care. Always follow your healthcare professional's instructions.

## 2022-12-01 NOTE — PROGRESS NOTES
"SUBJECTIVE:   Nancy is a 72 year old who presents for Preventive Visit.       Patient has been advised of split billing requirements and indicates understanding: Yes  Are you in the first 12 months of your Medicare coverage?  No    Healthy Habits:     In general, how would you rate your overall health?  Fair    Frequency of exercise:  6-7 days/week    Duration of exercise:  15-30 minutes    Do you usually eat at least 4 servings of fruit and vegetables a day, include whole grains    & fiber and avoid regularly eating high fat or \"junk\" foods?  Yes    Taking medications regularly:  No    Barriers to taking medications:  Problems remembering to take them    Medication side effects:  None    Ability to successfully perform activities of daily living:  No assistance needed    Home Safety:  No safety concerns identified    Hearing Impairment:  Difficult to understand a speaker at a public meeting or [employer] service    In the past 6 months, have you been bothered by leaking of urine?  No    In general, how would you rate your overall mental or emotional health?  Good      PHQ-2 Total Score: 0    Additional concerns today:  Yes      Have you ever done Advance Care Planning? (For example, a Health Directive, POLST, or a discussion with a medical provider or your loved ones about your wishes): Yes, advance care planning is on file.      Fall risk  Fallen 2 or more times in the past year?: No  Any fall with injury in the past year?: No    Cognitive Screening   1) Repeat 3 items (Leader, Season, Table)    2) Clock draw: NORMAL  3) 3 item recall: Recalls 3 objects  Results: 3 items recalled: COGNITIVE IMPAIRMENT LESS LIKELY    Mini-CogTM Copyright EDWARD Mueller. Licensed by the author for use in [facility]; reprinted with permission (aristeo@.[email or web address]). All rights reserved.      Do you have sleep apnea, excessive snoring or daytime drowsiness?: sometimes due to waking up early    Reviewed and updated as needed this visit by " clinical staff   Tobacco  Allergies  Meds              Reviewed and updated as needed this visit by Provider                 Social History     Tobacco Use     Smoking status: Former     Types: Cigarettes     Smokeless tobacco: Never   Substance Use Topics     Alcohol use: No       Alcohol Use 11/25/2022   Prescreen: >3 drinks/day or >7 drinks/week? No   Prescreen: >3 drinks/day or >7 drinks/week? -     PROBLEMS TO ADD ON...    Diabetes   Questioning how to take the glipizide, she is currently taking  Bid,  taken mid day and before dinner.  She is also on lantus 14 units, and metform 1000 bid     Last 7 days: 170 , low: 51 high: 300 , two hours readins are higher , dinner is the biggest meal, and Tries to have consistent carbs intake.       Current providers sharing in care for this patient include:   Patient Care Team:  Jus Rodney MD as PCP - General  Jus Rodney MD as Assigned PCP  Lauren Obrien, RN as Registered Nurse (Diabetes Education)  Toan Ramírez, PharmD as Pharmacist (Pharmacist)  Family Medicine, Physician, MD as MD (Family Practice)  Toan Ramírez, PharmD as Assigned [place] Pharmacist    The following health maintenance items are reviewed in Epic and correct as of today:  Health Maintenance   Topic Date Due     LUNG CANCER SCREENING  Never done     DIABETIC FOOT EXAM  07/09/2022     MEDICARE ANNUAL WELLNESS VISIT  08/16/2022     BMP  09/13/2022     A1C  01/26/2023     ANNUAL REVIEW OF HM ORDERS  04/01/2023     LIPID  06/16/2023     MICROALBUMIN  06/16/2023     EYE EXAM  06/27/2023     FALL RISK ASSESSMENT  12/01/2023     DTAP/TDAP/TD IMMUNIZATION (3 - Td or Tdap) 02/05/2024     MAMMO SCREENING  08/12/2024     ADVANCE CARE PLANNING  08/22/2026     COLORECTAL CANCER SCREENING  10/11/2029     DEXA  11/04/2036     HEPATITIS C SCREENING  Completed     PHQ-2 (once per calendar year)  Completed     INFLUENZA VACCINE  Completed     Pneumococcal Vaccine: 65+ Years  Completed     ZOSTER  "IMMUNIZATION  Completed     COVID-19 Vaccine  Completed     IPV IMMUNIZATION  Aged Out     MENINGITIS IMMUNIZATION  Aged Out     Lab work is in process  Labs reviewed in EPIC          Review of Systems      OBJECTIVE:   /74 (BP Location: Right arm, Patient Position: Sitting, Cuff Size: Adult Regular)   Pulse 60   Temp 98.8  F (37.1  C) (Oral)   Ht 1.626 m (5' 4\")   Wt 66.7 kg (147 lb)   BMI 25.23 kg/m   Estimated body mass index is 25.23 kg/m  as calculated from the following:    Height as of this encounter: 1.626 m (5' 4\").    Weight as of this encounter: 66.7 kg (147 lb).     Physical Exam  GENERAL APPEARANCE: healthy, alert and no distress  EYES: Eyes grossly normal to inspection, PERRL and conjunctivae and sclerae normal  HENT: nose and mouth without ulcers or lesions, oropharynx clear and oral mucous membranes moist  NECK: no adenopathy, no asymmetry, masses, or scars and thyroid normal to palpation  RESP: lungs clear to auscultation - no rales, rhonchi or wheezes  CV: regular rates and rhythm, normal S1 S2, no S3 or S4, no murmur, click or rub, peripheral pulses strong and +1 peripheral edema   ABDOMEN: soft, nontender, no hepatosplenomegaly, no masses and bowel sounds normal  MS: no musculoskeletal defects are noted and gait is age appropriate without ataxia  SKIN: no suspicious lesions or rashes  NEURO: Normal strength and tone, sensory exam grossly normal, mentation intact and speech normal  PSYCH: mentation appears normal and affect normal/bright    Diagnostic Test Results:  Labs reviewed in Epic    ASSESSMENT / PLAN:   (Z00.00) Encounter for Medicare annual wellness exam  (primary encounter diagnosis)      (I10) Benign Essential Hypertension  Comment: Normotensive  Plan: Continue amlodipine and Metroprolol as is, no dosage changes  .    (E11.40,  Z79.4) Type 2 diabetes mellitus with diabetic neuropathy, with long-term current use of insulin (H)  Comment:   Plan: Comprehensive metabolic panel " "(BMP + Alb, Alk         Phos, ALT, AST, Total. Bili, TP),           Elevated blood glucose and A1c  Goal to keep blood glucose average below 150  Follow-up 1 to 2 months if unable to achieve good glycemic control.    Refills dulaglutide         (TRULICITY) 3 MG/0.5ML SOPN, glipiZIDE         (GLUCOTROL) 5 MG tablet, insulin glargine         (LANTUS SOLOSTAR) 100 UNIT/ML pen, metFORMIN         (GLUCOPHAGE) 1000 MG tablet        (E78.5) Hyperlipidemia with target LDL less than 100  Comment: On ezetimibe      (Z12.31) Visit for screening mammogram  Comment: 8/22- Nl       (Z12.11) Special screening for malignant neoplasms, colon  Comment: 2019- polyp. rpt in 5 yrs        COUNSELING:  Reviewed preventive health counseling, as reflected in patient instructions       Regular exercise       Healthy diet/nutrition      BMI:   Estimated body mass index is 25.23 kg/m  as calculated from the following:    Height as of this encounter: 1.626 m (5' 4\").    Weight as of this encounter: 66.7 kg (147 lb).       She reports that she has quit smoking. Her smoking use included cigarettes. She has never used smokeless tobacco.      Appropriate preventive services were discussed with this patient, including applicable screening as appropriate for cardiovascular disease, diabetes, osteopenia/osteoporosis, and glaucoma.  As appropriate for age/gender, discussed screening for colorectal cancer, prostate cancer, breast cancer, and cervical cancer. Checklist reviewing preventive services available has been given to the patient.    Reviewed patients plan of care and provided an AVS. The Basic Care Plan (routine screening as documented in Health Maintenance) for Nancy meets the Care Plan requirement. This Care Plan has been established and reviewed with the Patient.          Jus Rodney MD  [facility]    Identified Health Risks:    The patient was provided with suggestions to help her develop a healthy physical " lifestyle.  The patient was provided with written information regarding signs of hearing loss.

## 2022-12-14 ENCOUNTER — TRANSFERRED RECORDS (OUTPATIENT)
Dept: HEALTH INFORMATION MANAGEMENT | Facility: CLINIC | Age: 72
End: 2022-12-14

## 2022-12-16 ENCOUNTER — TELEPHONE (OUTPATIENT)
Dept: FAMILY MEDICINE | Facility: CLINIC | Age: 72
End: 2022-12-16

## 2022-12-19 NOTE — TELEPHONE ENCOUNTER
Central Prior Authorization Team   Phone: [phone]    PA Initiation    Medication: baqsimi two pack 3MG/dose powder  Insurance Company: [employer] - Phone [phone] Fax [phone]  Pharmacy Filling the Rx: [id number] - [place], [place] - [address]  Filling Pharmacy Phone: [phone]  Filling Pharmacy Fax:    Start Date: 12/19/2022

## 2022-12-20 NOTE — TELEPHONE ENCOUNTER
Prior Authorization Approval    Authorization Effective Date: 11/19/2022  Authorization Expiration Date: 12/19/2023  Medication: baqsimi two pack 3MG/dose powder  Approved Dose/Quantity:    Reference #:     Insurance Company: [employer] - Phone [phone] Fax [phone]  Expected CoPay:       CoPay Card Available:      Foundation Assistance Needed:    Which Pharmacy is filling the prescription (Not needed for infusion/clinic administered): [id number] - [place], [place] - [address] [place] [address]  Pharmacy Notified: Yes  Patient Notified: Yes

## 2022-12-26 ENCOUNTER — MYC MEDICAL ADVICE (OUTPATIENT)
Dept: FAMILY MEDICINE | Facility: CLINIC | Age: 72
End: 2022-12-26

## 2022-12-26 DIAGNOSIS — E11.40 TYPE 2 DIABETES MELLITUS WITH DIABETIC NEUROPATHY, WITH LONG-TERM CURRENT USE OF INSULIN (H): ICD-10-CM

## 2022-12-26 DIAGNOSIS — Z79.4 TYPE 2 DIABETES MELLITUS WITH DIABETIC NEUROPATHY, WITH LONG-TERM CURRENT USE OF INSULIN (H): ICD-10-CM

## 2022-12-27 RX ORDER — GLUCOSAMINE HCL/CHONDROITIN SU 500-400 MG
CAPSULE ORAL
Qty: 300 STRIP | Refills: 0 | Status: SHIPPED | OUTPATIENT
Start: 2022-12-27 | End: 2024-02-07

## 2022-12-27 NOTE — TELEPHONE ENCOUNTER
Last fill September 2022    Last seen 12/1/22    E11.40,  Z79.4) Type 2 diabetes mellitus with diabetic neuropathy, with long-term current use of insulin (H)  Comment:   Plan: Comprehensive metabolic panel (BMP + Alb, Alk         Phos, ALT, AST, Total. Bili, TP),            Elevated blood glucose and A1c  Goal to keep blood glucose average below 150  Follow-up 1 to 2 months if unable to achieve good glycemic control.    No upcoming appointments

## 2023-01-12 ENCOUNTER — MYC MEDICAL ADVICE (OUTPATIENT)
Dept: FAMILY MEDICINE | Facility: CLINIC | Age: 73
End: 2023-01-12

## 2023-01-12 DIAGNOSIS — E11.40 TYPE 2 DIABETES MELLITUS WITH DIABETIC NEUROPATHY, WITH LONG-TERM CURRENT USE OF INSULIN (H): Primary | ICD-10-CM

## 2023-01-12 DIAGNOSIS — Z79.4 TYPE 2 DIABETES MELLITUS WITH DIABETIC NEUROPATHY, WITH LONG-TERM CURRENT USE OF INSULIN (H): Primary | ICD-10-CM

## 2023-01-31 ENCOUNTER — LAB (OUTPATIENT)
Dept: LAB | Facility: CLINIC | Age: 73
End: 2023-01-31
Payer: COMMERCIAL

## 2023-01-31 DIAGNOSIS — E11.40 TYPE 2 DIABETES MELLITUS WITH DIABETIC NEUROPATHY, WITH LONG-TERM CURRENT USE OF INSULIN (H): ICD-10-CM

## 2023-01-31 DIAGNOSIS — Z79.4 TYPE 2 DIABETES MELLITUS WITH DIABETIC NEUROPATHY, WITH LONG-TERM CURRENT USE OF INSULIN (H): ICD-10-CM

## 2023-01-31 PROCEDURE — 36415 COLL VENOUS BLD VENIPUNCTURE: CPT

## 2023-01-31 PROCEDURE — 84681 ASSAY OF C-PEPTIDE: CPT

## 2023-02-01 LAB — C PEPTIDE SERPL-MCNC: <0.1 NG/ML (ref 0.9–6.9)

## 2023-02-13 ENCOUNTER — OFFICE VISIT (OUTPATIENT)
Dept: FAMILY MEDICINE | Facility: CLINIC | Age: 73
End: 2023-02-13
Payer: COMMERCIAL

## 2023-02-13 VITALS
SYSTOLIC BLOOD PRESSURE: 154 MMHG | BODY MASS INDEX: 25.27 KG/M2 | DIASTOLIC BLOOD PRESSURE: 86 MMHG | HEIGHT: 64 IN | OXYGEN SATURATION: 100 % | HEART RATE: 71 BPM | WEIGHT: 148 LBS | TEMPERATURE: 97.2 F

## 2023-02-13 DIAGNOSIS — I10 ESSENTIAL HYPERTENSION, BENIGN: ICD-10-CM

## 2023-02-13 DIAGNOSIS — E10.9 TYPE 1 DIABETES MELLITUS WITHOUT COMPLICATION (H): Primary | ICD-10-CM

## 2023-02-13 PROCEDURE — 99215 OFFICE O/P EST HI 40 MIN: CPT | Performed by: FAMILY MEDICINE

## 2023-02-13 PROCEDURE — 99417 PROLNG OP E/M EACH 15 MIN: CPT | Performed by: FAMILY MEDICINE

## 2023-02-13 RX ORDER — INSULIN ASPART 100 [IU]/ML
2-10 INJECTION, SOLUTION INTRAVENOUS; SUBCUTANEOUS
Qty: 15 ML | Refills: 0 | Status: SHIPPED | OUTPATIENT
Start: 2023-02-13 | End: 2023-04-21

## 2023-02-13 RX ORDER — METOPROLOL SUCCINATE 25 MG/1
100 TABLET, EXTENDED RELEASE ORAL DAILY
Qty: 270 TABLET | Refills: 2
Start: 2023-02-13 | End: 2023-03-13

## 2023-02-13 RX ORDER — GLUCOSAMINE HCL/CHONDROITIN SU 500-400 MG
CAPSULE ORAL
Qty: 100 EACH | Refills: 3 | Status: SHIPPED | OUTPATIENT
Start: 2023-02-13

## 2023-02-13 RX ORDER — PEN NEEDLE, DIABETIC 32GX 5/32"
NEEDLE, DISPOSABLE MISCELLANEOUS
Qty: 100 EACH | Refills: 11 | Status: SHIPPED | OUTPATIENT
Start: 2023-02-13

## 2023-02-13 ASSESSMENT — PAIN SCALES - GENERAL: PAINLEVEL: NO PAIN (0)

## 2023-02-13 NOTE — PROGRESS NOTES
Assessment & Plan     Type 1 diabetes mellitus without complication (H)  Negligible C-peptide, reverting to type I from type II.     Plan:   Discontinue glipizide    Start NovoLog using the sliding scale below  B-200                     Then inject 2 units   201-250                     Then 4 units   251-300                     Then 6 units  301-350                     Then 8 units   351-400                     Then 10 units   > 401                         Then 12 units and recheck in 2 hours, and if still high to contact your doctor     Monitor blood glucose , and follow up with the educator    Continue on Trulicity, metformin and Lantus as is.  Follow up in a month     Benign Essential Hypertension  Elevated blood pressure   Plan:   Take metoprolol 4 tabs daily (total of 100 mg daily )   stay on amlodipine 7.5 mg daily.   Monitor BP      I spent a total of 75 minutes on the day of the visit.   Time spent doing chart review, history and exam, documentation and further activities per the note               Return in about 4 weeks (around 3/13/2023) for Follow up, using a video visit.    Jsu Rodney MD  [facility]    Cathleen Cruz is a 72 year old accompanied by her spouse, presenting for the med check and follow-up of Diabetes  With a history of variable and higher A1c readings, and C-peptide was checked to be negligible. She is managed on Trulicity, glipizide, metformin and Lantus.       History of Present Illness       Diabetes:   She presents for follow up of diabetes.  She is checking home blood glucose one time daily. She checks blood glucose before meals.  Blood glucose is sometimes over 200 and sometimes under 70. She is aware of hypoglycemia symptoms including shakiness, weakness, lethargy and other. She is concerned about blood sugar frequently over 200 and low blood sugar, several less than 70 in the past few weeks. She is having burning in feet, excessive thirst  "and weight loss.         She eats 2-3 servings of fruits and vegetables daily.She consumes 0 sweetened beverage(s) daily.She exercises with enough effort to increase her heart rate 20 to 29 minutes per day.  She exercises with enough effort to increase her heart rate 6 days per week. She is missing 1 dose(s) of medications per week.  She is not taking prescribed medications regularly due to remembering to take.       Review of Systems         Objective    BP (!) 154/86 (BP Location: Right arm, Patient Position: Right side, Cuff Size: Adult Small)   Pulse 71   Temp 97.2  F (36.2  C) (Oral)   Ht 1.626 m (5' 4\")   Wt 67.1 kg (148 lb)   SpO2 100%   BMI 25.40 kg/m    Body mass index is 25.4 kg/m .     Physical Exam                       "

## 2023-02-13 NOTE — PATIENT INSTRUCTIONS
Sliding scale:   B-200                     Then inject 2 units   201-250                     Then 4 units   251-300                     Then 6 units  301-350                     Then 8 units   351-400                     Then 10 units   > 401                         Then 12 units and recheck in 2 hours, and if still high to contact your doctor     Take metoprolol 4 tabs daily

## 2023-02-14 ENCOUNTER — MYC MEDICAL ADVICE (OUTPATIENT)
Dept: FAMILY MEDICINE | Facility: CLINIC | Age: 73
End: 2023-02-14
Payer: COMMERCIAL

## 2023-02-14 ENCOUNTER — TELEPHONE (OUTPATIENT)
Dept: FAMILY MEDICINE | Facility: CLINIC | Age: 73
End: 2023-02-14
Payer: COMMERCIAL

## 2023-02-14 DIAGNOSIS — E10.42 TYPE 1 DIABETES MELLITUS WITH DIABETIC POLYNEUROPATHY (H): Primary | ICD-10-CM

## 2023-02-16 NOTE — TELEPHONE ENCOUNTER
Our office received a denial letter before our staff was able to submit a PA to the INS  Unsure who started the PA - but it was not our team  Unfortunately [facility] does not allow a redetermination on any denial  This decision will have to be appealed  We do not handle appeals for any PA that was not submitted by our office.   I have added all information needed to process the appeal and can fax the full denial letter to the clinic if you could provide me a fax #

## 2023-02-18 NOTE — TELEPHONE ENCOUNTER
Central Prior Authorization Team   Phone: [phone]      Per the last visit, the patient's diagnosis was documented as:      On Rx order for both Readers and Sensor, diagnosis is documented as:      Sending back to provider to confirm which diagnosis is correct before sending PA.

## 2023-02-20 PROBLEM — Z79.4 TYPE 2 DIABETES MELLITUS WITH DIABETIC NEUROPATHY, WITH LONG-TERM CURRENT USE OF INSULIN (H): Status: RESOLVED | Noted: 2019-09-19 | Resolved: 2023-02-20

## 2023-02-20 PROBLEM — E11.40 TYPE 2 DIABETES MELLITUS WITH DIABETIC NEUROPATHY, WITH LONG-TERM CURRENT USE OF INSULIN (H): Status: RESOLVED | Noted: 2019-09-19 | Resolved: 2023-02-20

## 2023-02-20 NOTE — TELEPHONE ENCOUNTER
Central Prior Authorization Team   Phone: [phone]      Prior Authorization Not Needed per Insurance    Medication: Freestyle franny 2  Insurance Company: [employer] - Phone [phone] Fax [phone]  Expected CoPay:      Pharmacy Filling the Rx:    Pharmacy Notified:    Patient Notified:      Request was already submitted and denied by another party per CMM:    We did get a denial letter, but this was submitted by another party and not the CPA Department originally.,

## 2023-02-21 ENCOUNTER — MYC MEDICAL ADVICE (OUTPATIENT)
Dept: FAMILY MEDICINE | Facility: CLINIC | Age: 73
End: 2023-02-21
Payer: COMMERCIAL

## 2023-02-22 NOTE — TELEPHONE ENCOUNTER
We received a denial for covering Rene.  We were trying to PA for Dexcom.   Please see chart and recent communications, and follow up with the PA.

## 2023-02-23 NOTE — TELEPHONE ENCOUNTER
Received call from [facility]- apparently patient will not stop calling them about this PA.     PA has already been submitted and denied by someone other than the PA Team. We are unable to appeal or follow-up on PA's we did not complete, this will have to be handled by the clinic staff.     Office staff- please see Saskia Menjivar's documentation on how to appeal. If you would like denial letter faxed to office please let us know which fax number to send it to.

## 2023-02-24 RX ORDER — PROCHLORPERAZINE 25 MG/1
1 SUPPOSITORY RECTAL
Qty: 1 EACH | Refills: 1 | Status: SHIPPED | OUTPATIENT
Start: 2023-02-24 | End: 2023-03-13

## 2023-02-24 RX ORDER — PROCHLORPERAZINE 25 MG/1
1 SUPPOSITORY RECTAL
Qty: 3 EACH | Refills: 5 | Status: SHIPPED | OUTPATIENT
Start: 2023-02-24 | End: 2023-03-13

## 2023-02-24 RX ORDER — PROCHLORPERAZINE 25 MG/1
1 SUPPOSITORY RECTAL ONCE
Qty: 1 EACH | Refills: 0 | Status: SHIPPED | OUTPATIENT
Start: 2023-02-24 | End: 2023-02-24

## 2023-02-24 NOTE — TELEPHONE ENCOUNTER
Please send in Rx to pharmacy for Dexcom so we can initiate PA   Alexys'd up for approval   Once you approve Rx- send back to team & we will send to PA team

## 2023-02-24 NOTE — TELEPHONE ENCOUNTER
Please advise if you would like to appeal Rene.  Appears we are trying to get dexcom approved.  Would pt benefit from DM ed or MTM consult to assist with getting approval for CGM?

## 2023-02-24 NOTE — TELEPHONE ENCOUNTER
Prior Authorization Approval    Authorization Effective Date: 1/25/2023  Authorization Expiration Date: 2/23/2026  Medication: Dexcom G6  PA APPROVED  Approved Dose/Quantity: 1  Reference #: Key: [id number]   Insurance Company: [employer] - Phone [phone] Fax [phone]  Expected CoPay:       CoPay Card Available: No    Foundation Assistance Needed:    Which Pharmacy is filling the prescription (Not needed for infusion/clinic administered): [id number] - [place], [place] - [address] [place] [address]  Pharmacy Notified: Yes  Patient Notified: Comment:  Pharmacy will notify patient.

## 2023-03-01 ENCOUNTER — MYC MEDICAL ADVICE (OUTPATIENT)
Dept: FAMILY MEDICINE | Facility: CLINIC | Age: 73
End: 2023-03-01
Payer: COMMERCIAL

## 2023-03-01 DIAGNOSIS — E10.42 TYPE 1 DIABETES MELLITUS WITH DIABETIC POLYNEUROPATHY (H): Primary | ICD-10-CM

## 2023-03-10 ENCOUNTER — LAB (OUTPATIENT)
Dept: LAB | Facility: CLINIC | Age: 73
End: 2023-03-10
Payer: COMMERCIAL

## 2023-03-10 DIAGNOSIS — E10.42 TYPE 1 DIABETES MELLITUS WITH DIABETIC POLYNEUROPATHY (H): ICD-10-CM

## 2023-03-10 LAB — HBA1C MFR BLD: 10.7 % (ref 0–5.6)

## 2023-03-10 PROCEDURE — 36415 COLL VENOUS BLD VENIPUNCTURE: CPT

## 2023-03-10 PROCEDURE — 83036 HEMOGLOBIN GLYCOSYLATED A1C: CPT

## 2023-03-13 ENCOUNTER — VIRTUAL VISIT (OUTPATIENT)
Dept: FAMILY MEDICINE | Facility: CLINIC | Age: 73
End: 2023-03-13
Payer: COMMERCIAL

## 2023-03-13 DIAGNOSIS — E11.40 TYPE 2 DIABETES MELLITUS WITH DIABETIC NEUROPATHY, WITH LONG-TERM CURRENT USE OF INSULIN (H): ICD-10-CM

## 2023-03-13 DIAGNOSIS — I10 ESSENTIAL HYPERTENSION, BENIGN: ICD-10-CM

## 2023-03-13 DIAGNOSIS — Z79.4 TYPE 2 DIABETES MELLITUS WITH DIABETIC NEUROPATHY, WITH LONG-TERM CURRENT USE OF INSULIN (H): ICD-10-CM

## 2023-03-13 PROCEDURE — 99214 OFFICE O/P EST MOD 30 MIN: CPT | Mod: VID | Performed by: FAMILY MEDICINE

## 2023-03-13 RX ORDER — DULAGLUTIDE 3 MG/.5ML
3 INJECTION, SOLUTION SUBCUTANEOUS
Qty: 6 ML | Refills: 3 | Status: SHIPPED | OUTPATIENT
Start: 2023-03-13 | End: 2024-02-05

## 2023-03-13 RX ORDER — METOPROLOL SUCCINATE 100 MG/1
100 TABLET, EXTENDED RELEASE ORAL DAILY
Qty: 90 TABLET | Refills: 0 | Status: SHIPPED | OUTPATIENT
Start: 2023-03-13 | End: 2023-05-22

## 2023-03-13 NOTE — PROGRESS NOTES
Nancy is a 72 year old who is being evaluated via a billable video visit.      How would you like to obtain your AVS? MyChart  If the video visit is dropped, the invitation should be resent by: Send to e-mail at: [email or web address]  Will anyone else be joining your video visit?  might be joining.          Assessment & Plan     Essential hypertension, benign  Refill  - metoprolol succinate ER (TOPROL XL) 100 MG 24 hr tablet; Take 1 tablet (100 mg) by mouth daily    Type 2 diabetes mellitus with diabetic neuropathy, with long-term current use of insulin (H)  Recent labs were reviewed  More stable blood sugars on 7 units of Lantus  > Continue on Lantus 7 units  NovoLog inject 1 unit at dinner and follow half the unit amounts of what has been prescribed on sliding scale   Continue on Trulicity, metformin as is    Refill  - Dulaglutide (TRULICITY) 3 MG/0.5ML SOPN; Inject 3 mg Subcutaneous every 7 days for 180 days  - Hemoglobin A1c; Standing      I spent a total of 34 minutes on the day of the visit.   Time spent doing chart review, history and exam, documentation and further activities per the note              No follow-ups on file.    Jus Rodney MD  [facility]   Nancy is a 72 year old, presenting for the med check and follow-up of diabetes and hypertension.    She notes to having too frequent lows on 13 units of Lantus.  She is now taking 7 units with having only 1 or 2 low readings (dropping down to about 69 with a sensor going off)        History of Present Illness       Diabetes:   She presents for follow up of diabetes.  She is checking home blood glucose with a continuous glucose monitor.  She checks blood glucose before and after meals and at bedtime.  Blood glucose is sometimes over 200 and sometimes under 70. She is aware of hypoglycemia symptoms including shakiness, weakness and lethargy. She is concerned about blood sugar frequently over 200 and low blood  sugar, several less than 70 in the past few weeks. She is having burning in feet, excessive thirst and weight loss.         She eats 2-3 servings of fruits and vegetables daily.She consumes 0 sweetened beverage(s) daily.She exercises with enough effort to increase her heart rate 20 to 29 minutes per day.  She exercises with enough effort to increase her heart rate 6 days per week.   She is taking medications regularly.         Review of Systems         Objective           Vitals:  No vitals were obtained today due to virtual visit.    Physical Exam   GENERAL: Healthy, alert and no distress  PSYCH: Mentation appears normal, affect normal/bright, judgement and insight intact, normal speech and appearance well-groomed.                Video-Visit Details    Type of service:  Video Visit   Video Start Time: 5:08 PM  Video End Time:5:36 PM    Originating Location (pt. Location): Home    Distant Location (provider location):  On-site  Platform used for Video Visit: [place]

## 2023-03-20 ENCOUNTER — MYC MEDICAL ADVICE (OUTPATIENT)
Dept: FAMILY MEDICINE | Facility: CLINIC | Age: 73
End: 2023-03-20
Payer: COMMERCIAL

## 2023-03-20 DIAGNOSIS — M85.89 OSTEOPENIA OF MULTIPLE SITES: ICD-10-CM

## 2023-03-20 RX ORDER — ALENDRONATE SODIUM 70 MG/1
70 TABLET ORAL
Qty: 12 TABLET | Refills: 2 | Status: SHIPPED | OUTPATIENT
Start: 2023-03-20 | End: 2023-12-21

## 2023-03-27 ENCOUNTER — TRANSFERRED RECORDS (OUTPATIENT)
Dept: HEALTH INFORMATION MANAGEMENT | Facility: CLINIC | Age: 73
End: 2023-03-27

## 2023-04-04 ENCOUNTER — MYC MEDICAL ADVICE (OUTPATIENT)
Dept: FAMILY MEDICINE | Facility: CLINIC | Age: 73
End: 2023-04-04
Payer: COMMERCIAL

## 2023-04-04 DIAGNOSIS — E10.9 TYPE 1 DIABETES MELLITUS WITHOUT COMPLICATION (H): ICD-10-CM

## 2023-04-11 NOTE — TELEPHONE ENCOUNTER
"Patient calling to check status of refill request below. She states she has been using the Freestyle Rene 2 system for \"quite some time\" at this point and only has 1 sensor left as of this call.  Asking for this to be expedited if possible so [employer] has time to process and get them mailed to her before he last sensor expires in about 10 days.  "

## 2023-04-20 ENCOUNTER — VIRTUAL VISIT (OUTPATIENT)
Dept: FAMILY MEDICINE | Facility: CLINIC | Age: 73
End: 2023-04-20
Payer: COMMERCIAL

## 2023-04-20 DIAGNOSIS — Z79.4 TYPE 2 DIABETES MELLITUS WITH DIABETIC NEUROPATHY, WITH LONG-TERM CURRENT USE OF INSULIN (H): Primary | ICD-10-CM

## 2023-04-20 DIAGNOSIS — E11.40 TYPE 2 DIABETES MELLITUS WITH DIABETIC NEUROPATHY, WITH LONG-TERM CURRENT USE OF INSULIN (H): Primary | ICD-10-CM

## 2023-04-20 PROCEDURE — 99214 OFFICE O/P EST MOD 30 MIN: CPT | Mod: VID | Performed by: FAMILY MEDICINE

## 2023-05-03 ENCOUNTER — TRANSFERRED RECORDS (OUTPATIENT)
Dept: FAMILY MEDICINE | Facility: CLINIC | Age: 73
End: 2023-05-03
Payer: COMMERCIAL

## 2023-05-03 LAB — RETINOPATHY: POSITIVE

## 2023-05-22 ENCOUNTER — TRANSFERRED RECORDS (OUTPATIENT)
Dept: HEALTH INFORMATION MANAGEMENT | Facility: CLINIC | Age: 73
End: 2023-05-22
Payer: COMMERCIAL

## 2023-05-22 DIAGNOSIS — E11.40 TYPE 2 DIABETES MELLITUS WITH DIABETIC NEUROPATHY, WITH LONG-TERM CURRENT USE OF INSULIN (H): ICD-10-CM

## 2023-05-22 DIAGNOSIS — I10 ESSENTIAL HYPERTENSION, BENIGN: ICD-10-CM

## 2023-05-22 DIAGNOSIS — Z79.4 TYPE 2 DIABETES MELLITUS WITH DIABETIC NEUROPATHY, WITH LONG-TERM CURRENT USE OF INSULIN (H): ICD-10-CM

## 2023-05-22 RX ORDER — METOPROLOL SUCCINATE 100 MG/1
100 TABLET, EXTENDED RELEASE ORAL DAILY
Qty: 90 TABLET | Refills: 0 | Status: SHIPPED | OUTPATIENT
Start: 2023-05-22 | End: 2023-10-07

## 2023-05-22 RX ORDER — GABAPENTIN 300 MG/1
300 CAPSULE ORAL AT BEDTIME
Qty: 90 CAPSULE | Refills: 0 | Status: SHIPPED | OUTPATIENT
Start: 2023-05-22 | End: 2023-10-07

## 2023-05-31 ENCOUNTER — MYC MEDICAL ADVICE (OUTPATIENT)
Dept: FAMILY MEDICINE | Facility: CLINIC | Age: 73
End: 2023-05-31
Payer: COMMERCIAL

## 2023-06-01 ENCOUNTER — ALLIED HEALTH/NURSE VISIT (OUTPATIENT)
Dept: FAMILY MEDICINE | Facility: CLINIC | Age: 73
End: 2023-06-01
Payer: COMMERCIAL

## 2023-06-01 ENCOUNTER — TRANSFERRED RECORDS (OUTPATIENT)
Dept: HEALTH INFORMATION MANAGEMENT | Facility: CLINIC | Age: 73
End: 2023-06-01

## 2023-06-01 VITALS — DIASTOLIC BLOOD PRESSURE: 80 MMHG | SYSTOLIC BLOOD PRESSURE: 150 MMHG

## 2023-06-01 DIAGNOSIS — Z01.30 ENCOUNTER FOR BLOOD PRESSURE EXAMINATION: Primary | ICD-10-CM

## 2023-06-01 PROCEDURE — 99207 PR NO CHARGE NURSE ONLY: CPT

## 2023-06-01 NOTE — PROGRESS NOTES
I met with Nancy Hughes at the request of Jus Rodney to recheck her blood pressure.  Blood pressure medications on the med list were reviewed with patient.    Patient has taken all medications as per usual regimen: Yes  Patient reports tolerating them without any issues or concerns: No    Vitals:    06/01/23 1132 06/01/23 1141   BP: (!) 150/84 (!) 150/80   BP Location: Right arm Right arm   Patient Position: Sitting Sitting   Cuff Size: Adult Regular Adult Regular       After 5 minutes, the patient's blood pressure remained greater than or equal to 140/90.    Is the patient currently having any chest pain? No  Does the patient currently have a headache? No  Does the patient currently have any vision changes? No  Does the patient currently have any nausea? No  Does the patient currently have any abdominal pain? No    The previous encounter was reviewed.  The patient was discharged and the note will be sent to the provider for final review.     Patient states that she has been having swelling in both legs around her ankles (noticed a couple weeks ago) not sure if that has to do with the blood pressure medications or not

## 2023-06-15 ENCOUNTER — TRANSFERRED RECORDS (OUTPATIENT)
Dept: HEALTH INFORMATION MANAGEMENT | Facility: CLINIC | Age: 73
End: 2023-06-15
Payer: COMMERCIAL

## 2023-06-19 ENCOUNTER — LAB (OUTPATIENT)
Dept: LAB | Facility: CLINIC | Age: 73
End: 2023-06-19
Payer: COMMERCIAL

## 2023-06-19 DIAGNOSIS — E11.40 TYPE 2 DIABETES MELLITUS WITH DIABETIC NEUROPATHY, WITH LONG-TERM CURRENT USE OF INSULIN (H): ICD-10-CM

## 2023-06-19 DIAGNOSIS — E11.319 DIABETIC RETINOPATHY (H): Primary | ICD-10-CM

## 2023-06-19 DIAGNOSIS — Z79.4 TYPE 2 DIABETES MELLITUS WITH DIABETIC NEUROPATHY, WITH LONG-TERM CURRENT USE OF INSULIN (H): ICD-10-CM

## 2023-06-19 LAB
CREAT UR-MCNC: 65.9 MG/DL
HBA1C MFR BLD: 8.3 % (ref 0–5.6)
MICROALBUMIN UR-MCNC: 22.3 MG/L
MICROALBUMIN/CREAT UR: 33.84 MG/G CR (ref 0–25)

## 2023-06-19 PROCEDURE — 82570 ASSAY OF URINE CREATININE: CPT

## 2023-06-19 PROCEDURE — 36415 COLL VENOUS BLD VENIPUNCTURE: CPT

## 2023-06-19 PROCEDURE — 82043 UR ALBUMIN QUANTITATIVE: CPT

## 2023-06-19 PROCEDURE — 83036 HEMOGLOBIN GLYCOSYLATED A1C: CPT

## 2023-06-22 ENCOUNTER — OFFICE VISIT (OUTPATIENT)
Dept: FAMILY MEDICINE | Facility: CLINIC | Age: 73
End: 2023-06-22
Payer: COMMERCIAL

## 2023-06-22 VITALS
BODY MASS INDEX: 24.91 KG/M2 | HEIGHT: 64 IN | OXYGEN SATURATION: 98 % | DIASTOLIC BLOOD PRESSURE: 76 MMHG | WEIGHT: 145.9 LBS | HEART RATE: 73 BPM | RESPIRATION RATE: 18 BRPM | TEMPERATURE: 98.4 F | SYSTOLIC BLOOD PRESSURE: 138 MMHG

## 2023-06-22 DIAGNOSIS — E11.319 DIABETIC RETINOPATHY ASSOCIATED WITH TYPE 2 DIABETES MELLITUS, MACULAR EDEMA PRESENCE UNSPECIFIED, UNSPECIFIED LATERALITY, UNSPECIFIED RETINOPATHY SEVERITY (H): ICD-10-CM

## 2023-06-22 DIAGNOSIS — I10 ESSENTIAL HYPERTENSION, BENIGN: Primary | ICD-10-CM

## 2023-06-22 DIAGNOSIS — E78.5 HYPERLIPIDEMIA WITH TARGET LDL LESS THAN 100: ICD-10-CM

## 2023-06-22 DIAGNOSIS — E11.9 ENCOUNTER FOR DIABETIC FOOT EXAM (H): ICD-10-CM

## 2023-06-22 PROCEDURE — 99215 OFFICE O/P EST HI 40 MIN: CPT | Performed by: FAMILY MEDICINE

## 2023-06-22 ASSESSMENT — PAIN SCALES - GENERAL: PAINLEVEL: NO PAIN (0)

## 2023-06-22 NOTE — PATIENT INSTRUCTIONS
Continue current management   Keep blood glucose average < 200, ideally < 180     Recheck HgA1c in 6 months prior to your next appointment   Continue will all other meds as they are, no other changes.

## 2023-06-22 NOTE — PROGRESS NOTES
Assessment & Plan   Problem List Items Addressed This Visit        Endocrine    Diabetic retinopathy (H)     The A1c has improved down to 8.2  Continue with your current antidiabetic medications keep blood glucose average below 200 ideally below 180  Use the addition of NovoLog to keep BG below 200  Recheck A1c in 6 months prior to the next appointment         Relevant Orders    Hemoglobin A1c    Hyperlipidemia with target LDL less than 100     Continue on Zetia  Call for refill            Circulatory    Benign Essential Hypertension - Primary     Normotensive on amlodipine and Metroprolol  Continue current management        Other Visit Diagnoses     Encounter for diabetic foot exam (H)                 I spent a total of 42 minutes on the day of the visit.   Time spent by me doing chart review, history and exam, documentation and further activities per the note                 Jus Rodney MD  [facility]    Cathleen Cruz is a 72 year old, presenting for the med check and follow-up of hypertension, HLD and diabetes mellitus.         06/22/2023   10:15 AM   Additional Questions   Roomed by Vijay CUENCA MA     History of Present Illness       Diabetes:   She presents for follow up of diabetes.  She is checking home blood glucose with a continuous glucose monitor.  She checks blood glucose before meals.  Blood glucose is sometimes over 200 and sometimes under 70. She is aware of hypoglycemia symptoms including weakness and other. She is concerned about blood sugar frequently over 200 and low blood sugar, several less than 70 in the past few weeks. She is having excessive thirst. The patient has had a diabetic eye exam in the last 12 months. Eye exam performed on 5/3/23. Location of last eye exam [facility].        She eats 2-3 servings of fruits and vegetables daily.She consumes 0 sweetened beverage(s) daily.She exercises with enough effort to increase her heart rate 20 to 29  "minutes per day.  She exercises with enough effort to increase her heart rate 6 days per week. She is missing 1 dose(s) of medications per week.  She is not taking prescribed medications regularly due to remembering to take.       Hypertension Follow-up      Do you check your blood pressure regularly outside of the clinic? Yes     Are you following a low salt diet? Yes    Are your blood pressures ever more than 140 on the top number (systolic) OR more   than 90 on the bottom number (diastolic), for example 140/90? Yes, top number has been 146-148          Review of Systems         Objective    /76 (BP Location: Right arm, Patient Position: Sitting, Cuff Size: Adult Regular)   Pulse 73   Temp 98.4  F (36.9  C) (Oral)   Resp 18   Ht 1.626 m (5' 4\")   Wt 66.2 kg (145 lb 14.4 oz)   LMP  (LMP Unknown)   SpO2 98%   BMI 25.04 kg/m    Body mass index is 25.04 kg/m .     Physical Exam   DIABETIC FOOT EXAM: normal DP and PT pulses, no trophic changes or ulcerative lesions and normal sensory exam                    "

## 2023-06-22 NOTE — ASSESSMENT & PLAN NOTE
The A1c has improved down to 8.2  Continue with your current antidiabetic medications keep blood glucose average below 200 ideally below 180  Use the addition of NovoLog to keep BG below 200  Recheck A1c in 6 months prior to the next appointment

## 2023-07-28 ENCOUNTER — MYC MEDICAL ADVICE (OUTPATIENT)
Dept: FAMILY MEDICINE | Facility: CLINIC | Age: 73
End: 2023-07-28
Payer: COMMERCIAL

## 2023-07-28 DIAGNOSIS — E78.49 OTHER HYPERLIPIDEMIA: ICD-10-CM

## 2023-07-28 DIAGNOSIS — I10 ESSENTIAL HYPERTENSION, BENIGN: ICD-10-CM

## 2023-07-28 RX ORDER — EZETIMIBE 10 MG/1
10 TABLET ORAL DAILY
Qty: 90 TABLET | Refills: 3 | Status: SHIPPED | OUTPATIENT
Start: 2023-07-28 | End: 2024-08-17

## 2023-08-02 RX ORDER — AMLODIPINE BESYLATE 5 MG/1
7.5 TABLET ORAL DAILY
Qty: 135 TABLET | Refills: 0 | Status: SHIPPED | OUTPATIENT
Start: 2023-08-02 | End: 2023-12-21

## 2023-08-15 ENCOUNTER — ANCILLARY PROCEDURE (OUTPATIENT)
Dept: MAMMOGRAPHY | Facility: CLINIC | Age: 73
End: 2023-08-15
Attending: FAMILY MEDICINE
Payer: COMMERCIAL

## 2023-08-15 DIAGNOSIS — Z12.31 VISIT FOR SCREENING MAMMOGRAM: ICD-10-CM

## 2023-08-15 PROCEDURE — 77067 SCR MAMMO BI INCL CAD: CPT

## 2023-10-05 ENCOUNTER — MYC MEDICAL ADVICE (OUTPATIENT)
Dept: FAMILY MEDICINE | Facility: CLINIC | Age: 73
End: 2023-10-05
Payer: COMMERCIAL

## 2023-10-07 ENCOUNTER — MYC REFILL (OUTPATIENT)
Dept: FAMILY MEDICINE | Facility: CLINIC | Age: 73
End: 2023-10-07
Payer: COMMERCIAL

## 2023-10-07 DIAGNOSIS — I10 ESSENTIAL HYPERTENSION, BENIGN: ICD-10-CM

## 2023-10-07 DIAGNOSIS — Z79.4 TYPE 2 DIABETES MELLITUS WITH DIABETIC NEUROPATHY, WITH LONG-TERM CURRENT USE OF INSULIN (H): ICD-10-CM

## 2023-10-07 DIAGNOSIS — E11.40 TYPE 2 DIABETES MELLITUS WITH DIABETIC NEUROPATHY, WITH LONG-TERM CURRENT USE OF INSULIN (H): ICD-10-CM

## 2023-10-10 RX ORDER — METOPROLOL SUCCINATE 100 MG/1
100 TABLET, EXTENDED RELEASE ORAL DAILY
Qty: 90 TABLET | Refills: 1 | Status: SHIPPED | OUTPATIENT
Start: 2023-10-10 | End: 2023-12-21

## 2023-10-10 NOTE — TELEPHONE ENCOUNTER
Prescription approved per [facility] Refill Protocol.     ALONZO Piedra [facility]

## 2023-10-11 RX ORDER — GABAPENTIN 300 MG/1
300 CAPSULE ORAL AT BEDTIME
Qty: 90 CAPSULE | Refills: 0 | Status: SHIPPED | OUTPATIENT
Start: 2023-10-11 | End: 2023-12-21

## 2023-10-18 ENCOUNTER — PATIENT OUTREACH (OUTPATIENT)
Dept: GASTROENTEROLOGY | Facility: CLINIC | Age: 73
End: 2023-10-18
Payer: COMMERCIAL

## 2023-10-22 ENCOUNTER — MYC MEDICAL ADVICE (OUTPATIENT)
Dept: FAMILY MEDICINE | Facility: CLINIC | Age: 73
End: 2023-10-22
Payer: COMMERCIAL

## 2023-10-22 DIAGNOSIS — E10.9 TYPE 1 DIABETES MELLITUS WITHOUT COMPLICATION (H): ICD-10-CM

## 2023-10-23 RX ORDER — INSULIN ASPART 100 [IU]/ML
10-17 INJECTION, SOLUTION INTRAVENOUS; SUBCUTANEOUS
Qty: 60 ML | Refills: 0 | Status: SHIPPED | OUTPATIENT
Start: 2023-10-23 | End: 2023-12-21

## 2023-11-08 ENCOUNTER — PATIENT OUTREACH (OUTPATIENT)
Dept: CARE COORDINATION | Facility: CLINIC | Age: 73
End: 2023-11-08
Payer: COMMERCIAL

## 2023-11-10 ENCOUNTER — MYC REFILL (OUTPATIENT)
Dept: FAMILY MEDICINE | Facility: CLINIC | Age: 73
End: 2023-11-10
Payer: COMMERCIAL

## 2023-11-10 DIAGNOSIS — E78.49 OTHER HYPERLIPIDEMIA: ICD-10-CM

## 2023-11-10 DIAGNOSIS — Z79.4 TYPE 2 DIABETES MELLITUS WITH DIABETIC NEUROPATHY, WITH LONG-TERM CURRENT USE OF INSULIN (H): ICD-10-CM

## 2023-11-10 DIAGNOSIS — I10 ESSENTIAL HYPERTENSION, BENIGN: ICD-10-CM

## 2023-11-10 DIAGNOSIS — E11.40 TYPE 2 DIABETES MELLITUS WITH DIABETIC NEUROPATHY, WITH LONG-TERM CURRENT USE OF INSULIN (H): ICD-10-CM

## 2023-11-10 RX ORDER — GABAPENTIN 300 MG/1
300 CAPSULE ORAL AT BEDTIME
Qty: 90 CAPSULE | Refills: 0 | OUTPATIENT
Start: 2023-11-10

## 2023-11-10 RX ORDER — AMLODIPINE BESYLATE 5 MG/1
7.5 TABLET ORAL DAILY
Qty: 135 TABLET | Refills: 0 | Status: CANCELLED | OUTPATIENT
Start: 2023-11-10

## 2023-11-10 RX ORDER — EZETIMIBE 10 MG/1
10 TABLET ORAL DAILY
Qty: 90 TABLET | Refills: 3 | OUTPATIENT
Start: 2023-11-10

## 2023-11-13 NOTE — TELEPHONE ENCOUNTER
Please call patient to find out if she has enough pills available to last her up to the next appointment coming up in a month

## 2023-12-18 ENCOUNTER — LAB (OUTPATIENT)
Dept: LAB | Facility: CLINIC | Age: 73
End: 2023-12-18
Payer: COMMERCIAL

## 2023-12-18 DIAGNOSIS — E11.319 DIABETIC RETINOPATHY ASSOCIATED WITH TYPE 2 DIABETES MELLITUS, MACULAR EDEMA PRESENCE UNSPECIFIED, UNSPECIFIED LATERALITY, UNSPECIFIED RETINOPATHY SEVERITY (H): ICD-10-CM

## 2023-12-18 DIAGNOSIS — I10 ESSENTIAL HYPERTENSION, BENIGN: Primary | ICD-10-CM

## 2023-12-18 DIAGNOSIS — E11.319 DIABETIC RETINOPATHY (H): ICD-10-CM

## 2023-12-18 LAB — HBA1C MFR BLD: 7.9 % (ref 0–5.6)

## 2023-12-18 PROCEDURE — 36415 COLL VENOUS BLD VENIPUNCTURE: CPT

## 2023-12-18 PROCEDURE — 83036 HEMOGLOBIN GLYCOSYLATED A1C: CPT

## 2023-12-21 ENCOUNTER — OFFICE VISIT (OUTPATIENT)
Dept: FAMILY MEDICINE | Facility: CLINIC | Age: 73
End: 2023-12-21
Payer: COMMERCIAL

## 2023-12-21 VITALS
RESPIRATION RATE: 18 BRPM | SYSTOLIC BLOOD PRESSURE: 132 MMHG | TEMPERATURE: 98.1 F | WEIGHT: 150.8 LBS | HEART RATE: 72 BPM | OXYGEN SATURATION: 99 % | DIASTOLIC BLOOD PRESSURE: 74 MMHG | BODY MASS INDEX: 25.88 KG/M2

## 2023-12-21 DIAGNOSIS — I10 ESSENTIAL HYPERTENSION, BENIGN: Primary | ICD-10-CM

## 2023-12-21 DIAGNOSIS — Z79.4 TYPE 2 DIABETES MELLITUS WITH DIABETIC NEUROPATHY, WITH LONG-TERM CURRENT USE OF INSULIN (H): ICD-10-CM

## 2023-12-21 DIAGNOSIS — M85.89 OSTEOPENIA OF MULTIPLE SITES: ICD-10-CM

## 2023-12-21 DIAGNOSIS — E11.40 TYPE 2 DIABETES MELLITUS WITH DIABETIC NEUROPATHY, WITH LONG-TERM CURRENT USE OF INSULIN (H): ICD-10-CM

## 2023-12-21 PROCEDURE — 99214 OFFICE O/P EST MOD 30 MIN: CPT | Performed by: FAMILY MEDICINE

## 2023-12-21 RX ORDER — INSULIN GLARGINE 100 [IU]/ML
13 INJECTION, SOLUTION SUBCUTANEOUS AT BEDTIME
Qty: 11.7 ML | Refills: 1 | Status: SHIPPED | OUTPATIENT
Start: 2023-12-21 | End: 2024-02-07

## 2023-12-21 RX ORDER — METOPROLOL SUCCINATE 100 MG/1
100 TABLET, EXTENDED RELEASE ORAL DAILY
Qty: 90 TABLET | Refills: 3 | Status: SHIPPED | OUTPATIENT
Start: 2023-12-21 | End: 2024-02-07

## 2023-12-21 RX ORDER — AMLODIPINE BESYLATE 5 MG/1
7.5 TABLET ORAL DAILY
Qty: 135 TABLET | Refills: 3 | Status: SHIPPED | OUTPATIENT
Start: 2023-12-21 | End: 2024-02-07

## 2023-12-21 RX ORDER — ALENDRONATE SODIUM 70 MG/1
70 TABLET ORAL
Qty: 12 TABLET | Refills: 3 | Status: SHIPPED | OUTPATIENT
Start: 2023-12-21 | End: 2024-02-07

## 2023-12-21 RX ORDER — GABAPENTIN 300 MG/1
300 CAPSULE ORAL AT BEDTIME
Qty: 90 CAPSULE | Refills: 3 | Status: SHIPPED | OUTPATIENT
Start: 2023-12-21 | End: 2024-02-07

## 2023-12-21 RX ORDER — INSULIN ASPART 100 [IU]/ML
10-17 INJECTION, SOLUTION INTRAVENOUS; SUBCUTANEOUS
Qty: 60 ML | Refills: 0 | Status: SHIPPED | OUTPATIENT
Start: 2023-12-21 | End: 2024-02-07

## 2023-12-21 NOTE — ASSESSMENT & PLAN NOTE
The A1c has improved down to 7.9    Novolog: going with the sliding scale adding up to 4 units   Lantus 8 in am and 1-2 at night   Metformin 1000 mg bid     BG averagin below 180  Recheck in 6 months

## 2023-12-21 NOTE — PROGRESS NOTES
"  Assessment & Plan   Problem List Items Addressed This Visit       Benign Essential Hypertension - Primary     Normotensive on amlodipine and Metroprolol  Continue current management         Relevant Medications    amLODIPine (NORVASC) 5 MG tablet    metoprolol succinate ER (TOPROL XL) 100 MG 24 hr tablet    Osteopenia of multiple sites    Relevant Medications    alendronate (FOSAMAX) 70 MG tablet    Other Relevant Orders    DEXA HIP/PELVIS/SPINE - Future    Type 2 diabetes mellitus with diabetic neuropathy, with long-term current use of insulin (H)     The A1c has improved down to 7.9    Novolog: going with the sliding scale adding up to 4 units   Lantus 8 in am and 1-2 at night   Metformin 1000 mg bid     BG averagin below 180  Recheck in 6 months          Relevant Medications    metFORMIN (GLUCOPHAGE) 1000 MG tablet    insulin glargine (LANTUS SOLOSTAR) 100 UNIT/ML pen    insulin aspart (NOVOLOG FLEXPEN RELION) 100 UNIT/ML pen    gabapentin (NEURONTIN) 300 MG capsule    alendronate (FOSAMAX) 70 MG tablet          I spent a total of 35 minutes on the day of the visit.   Time spent by me doing chart review, history and exam, documentation and further activities per the note       BMI:   Estimated body mass index is 25.88 kg/m  as calculated from the following:    Height as of 6/22/23: 1.626 m (5' 4\").    Weight as of this encounter: 68.4 kg (150 lb 12.8 oz).       Jus Rodney MD  [facility] is a 73 year [facility], presenting for the med check and follow-up:  Essential hypertension, benign  Doing well on current order, and requesting refills      Type 2 diabetes mellitus with diabetic neuropathy, with long-term current use of insulin (H)  The A1c has improved down to 7.9    Novolog: going with the sliding scale adding up to 4 units   Lantus 8 in am and 1-2 at night   Metformin 1000 mg bid     BG averaging below 180      Osteopenia of multiple sites  Due to - DEXA " HIP/PELVIS/SPINE -   Needs reill on   - alendronate (FOSAMAX) 70 MG tablet                 12/21/2023    10:16 AM   Additional Questions   Roomed by Mikael Jade       History of Present Illness       Diabetes:   She presents for follow up of diabetes.  She is checking home blood glucose four or more times daily.   She checks blood glucose before and after meals and at bedtime.  Blood glucose is sometimes over 200 and sometimes under 70. She is aware of hypoglycemia symptoms including shakiness, weakness and other.   She is concerned about blood sugar frequently over 200 and low blood sugar, several less than 70 in the past few weeks.   She is having burning in feet and excessive thirst.            She eats 2-3 servings of fruits and vegetables daily.She consumes 0 sweetened beverage(s) daily.She exercises with enough effort to increase her heart rate 20 to 29 minutes per day.  She is missing 1 dose(s) of medications per week.  She is not taking prescribed medications regularly due to remembering to take.       Review of Systems         Objective    /74 (BP Location: Right arm, Patient Position: Sitting, Cuff Size: Adult Regular)   Pulse 72   Temp 98.1  F (36.7  C) (Oral)   Resp 18   Wt 68.4 kg (150 lb 12.8 oz)   LMP  (LMP Unknown)   SpO2 99%   BMI 25.88 kg/m    Body mass index is 25.88 kg/m .    Physical Exam   GENERAL: healthy, alert and no distress  NECK: no adenopathy, no asymmetry, masses, or scars and thyroid normal to palpation  RESP: lungs clear to auscultation - no rales, rhonchi or wheezes  CV: regular rate and rhythm, normal S1 S2, no S3 or S4, no murmur, click or rub, no peripheral edema and peripheral pulses strong  MS: no gross musculoskeletal defects noted, no edema

## 2023-12-29 ASSESSMENT — ENCOUNTER SYMPTOMS
WEAKNESS: 0
ABDOMINAL PAIN: 0
BREAST MASS: 0
FEVER: 0
COUGH: 0
SHORTNESS OF BREATH: 0
HEARTBURN: 0
PARESTHESIAS: 0
PALPITATIONS: 0
NAUSEA: 0
CHILLS: 0
SORE THROAT: 0
CONSTIPATION: 0
EYE PAIN: 0
FREQUENCY: 0
DYSURIA: 0
JOINT SWELLING: 0
ARTHRALGIAS: 0
NERVOUS/ANXIOUS: 0
HEADACHES: 0
DIZZINESS: 0
HEMATOCHEZIA: 0
HEMATURIA: 0
MYALGIAS: 0
DIARRHEA: 0

## 2023-12-29 ASSESSMENT — ACTIVITIES OF DAILY LIVING (ADL): CURRENT_FUNCTION: NO ASSISTANCE NEEDED

## 2024-01-05 ENCOUNTER — LAB (OUTPATIENT)
Dept: LAB | Facility: CLINIC | Age: 74
End: 2024-01-05
Payer: COMMERCIAL

## 2024-01-05 ENCOUNTER — OFFICE VISIT (OUTPATIENT)
Dept: FAMILY MEDICINE | Facility: CLINIC | Age: 74
End: 2024-01-05
Payer: COMMERCIAL

## 2024-01-05 VITALS
DIASTOLIC BLOOD PRESSURE: 80 MMHG | HEIGHT: 65 IN | WEIGHT: 148.7 LBS | TEMPERATURE: 97.6 F | RESPIRATION RATE: 16 BRPM | SYSTOLIC BLOOD PRESSURE: 148 MMHG | HEART RATE: 70 BPM | BODY MASS INDEX: 24.78 KG/M2 | OXYGEN SATURATION: 98 %

## 2024-01-05 DIAGNOSIS — E11.319 DIABETIC RETINOPATHY (H): ICD-10-CM

## 2024-01-05 DIAGNOSIS — E78.49 OTHER HYPERLIPIDEMIA: ICD-10-CM

## 2024-01-05 DIAGNOSIS — M85.89 OSTEOPENIA OF MULTIPLE SITES: ICD-10-CM

## 2024-01-05 DIAGNOSIS — Z12.11 SPECIAL SCREENING FOR MALIGNANT NEOPLASMS, COLON: ICD-10-CM

## 2024-01-05 DIAGNOSIS — Z00.00 ENCOUNTER FOR MEDICARE ANNUAL WELLNESS EXAM: ICD-10-CM

## 2024-01-05 DIAGNOSIS — Z79.4 TYPE 2 DIABETES MELLITUS WITH DIABETIC NEUROPATHY, WITH LONG-TERM CURRENT USE OF INSULIN (H): ICD-10-CM

## 2024-01-05 DIAGNOSIS — Z00.00 ENCOUNTER FOR MEDICARE ANNUAL WELLNESS EXAM: Primary | ICD-10-CM

## 2024-01-05 DIAGNOSIS — E11.40 TYPE 2 DIABETES MELLITUS WITH DIABETIC NEUROPATHY, WITH LONG-TERM CURRENT USE OF INSULIN (H): ICD-10-CM

## 2024-01-05 DIAGNOSIS — I10 ESSENTIAL HYPERTENSION, BENIGN: ICD-10-CM

## 2024-01-05 LAB
ANION GAP SERPL CALCULATED.3IONS-SCNC: 9 MMOL/L (ref 7–15)
BUN SERPL-MCNC: 11.8 MG/DL (ref 8–23)
CALCIUM SERPL-MCNC: 9.3 MG/DL (ref 8.8–10.2)
CHLORIDE SERPL-SCNC: 100 MMOL/L (ref 98–107)
CHOLEST SERPL-MCNC: 172 MG/DL
CREAT SERPL-MCNC: 0.53 MG/DL (ref 0.51–0.95)
DEPRECATED HCO3 PLAS-SCNC: 29 MMOL/L (ref 22–29)
EGFRCR SERPLBLD CKD-EPI 2021: >90 ML/MIN/1.73M2
ERYTHROCYTE [DISTWIDTH] IN BLOOD BY AUTOMATED COUNT: 16 % (ref 10–15)
FASTING STATUS PATIENT QL REPORTED: YES
GLUCOSE SERPL-MCNC: 206 MG/DL (ref 70–99)
HCT VFR BLD AUTO: 38.6 % (ref 35–47)
HDLC SERPL-MCNC: 88 MG/DL
HGB BLD-MCNC: 13.2 G/DL (ref 11.7–15.7)
LDLC SERPL CALC-MCNC: 76 MG/DL
MCH RBC QN AUTO: 30.3 PG (ref 26.5–33)
MCHC RBC AUTO-ENTMCNC: 34.2 G/DL (ref 31.5–36.5)
MCV RBC AUTO: 89 FL (ref 78–100)
NONHDLC SERPL-MCNC: 84 MG/DL
PLATELET # BLD AUTO: 254 10E3/UL (ref 150–450)
POTASSIUM SERPL-SCNC: 5 MMOL/L (ref 3.4–5.3)
RBC # BLD AUTO: 4.36 10E6/UL (ref 3.8–5.2)
SODIUM SERPL-SCNC: 138 MMOL/L (ref 135–145)
TRIGL SERPL-MCNC: 42 MG/DL
TSH SERPL DL<=0.005 MIU/L-ACNC: 2.44 UIU/ML (ref 0.3–4.2)
WBC # BLD AUTO: 5.1 10E3/UL (ref 4–11)

## 2024-01-05 PROCEDURE — 36415 COLL VENOUS BLD VENIPUNCTURE: CPT

## 2024-01-05 PROCEDURE — 84443 ASSAY THYROID STIM HORMONE: CPT

## 2024-01-05 PROCEDURE — G0439 PPPS, SUBSEQ VISIT: HCPCS | Performed by: FAMILY MEDICINE

## 2024-01-05 PROCEDURE — 99214 OFFICE O/P EST MOD 30 MIN: CPT | Mod: 25 | Performed by: FAMILY MEDICINE

## 2024-01-05 PROCEDURE — 85027 COMPLETE CBC AUTOMATED: CPT

## 2024-01-05 PROCEDURE — 80061 LIPID PANEL: CPT

## 2024-01-05 PROCEDURE — 80048 BASIC METABOLIC PNL TOTAL CA: CPT

## 2024-01-05 ASSESSMENT — ENCOUNTER SYMPTOMS
EYE PAIN: 0
JOINT SWELLING: 0
HEMATURIA: 0
WEAKNESS: 0
FREQUENCY: 0
ABDOMINAL PAIN: 0
COUGH: 0
DYSURIA: 0
CHILLS: 0
DIZZINESS: 0
FEVER: 0
DIARRHEA: 0
HEADACHES: 0
SHORTNESS OF BREATH: 0
CONSTIPATION: 0
MYALGIAS: 0
SORE THROAT: 0
ARTHRALGIAS: 0
PARESTHESIAS: 0
HEARTBURN: 0
BREAST MASS: 0
PALPITATIONS: 0
NERVOUS/ANXIOUS: 0
HEMATOCHEZIA: 0
NAUSEA: 0

## 2024-01-05 ASSESSMENT — ACTIVITIES OF DAILY LIVING (ADL): CURRENT_FUNCTION: NO ASSISTANCE NEEDED

## 2024-01-05 NOTE — PATIENT INSTRUCTIONS
Patient Education   Personalized Prevention Plan  You are due for the preventive services outlined below.  Your care team is available to assist you in scheduling these services.  If you have already completed any of these items, please share that information with your care team to update in your medical record.  Health Maintenance Due   Topic Date Due     LUNG CANCER SCREENING  Never done     Osteoporosis Screening  11/04/2023     Basic Metabolic Panel  12/01/2023     Cholesterol Lab  12/01/2023

## 2024-01-05 NOTE — PROGRESS NOTES
"SUBJECTIVE:   Nancy is a 73 year old, presenting for the following:  Wellness Visit        1/5/2024     8:44 AM   Additional Questions   Roomed by Mikael Na       Are you in the first 12 months of your Medicare coverage?  No    Healthy Habits:     In general, how would you rate your overall health?  Good    Frequency of exercise:  6-7 days/week    Duration of exercise:  15-30 minutes    Do you usually eat at least 4 servings of fruit and vegetables a day, include whole grains    & fiber and avoid regularly eating high fat or \"junk\" foods?  Yes    Taking medications regularly:  No    Barriers to taking medications:  Problems remembering to take them    Medication side effects:  None    Ability to successfully perform activities of daily living:  No assistance needed    Home Safety:  No safety concerns identified    Hearing Impairment:  No hearing concerns    In the past 6 months, have you been bothered by leaking of urine?  No    In general, how would you rate your overall mental or emotional health?  Excellent    Additional concerns today:  No      Today's PHQ-2 Score:       1/5/2024     8:38 AM   PHQ-2 ( 1999 [employer])   Q1: Little interest or pleasure in doing things 0   Q2: Feeling down, depressed or hopeless 0   PHQ-2 Score 0   Q1: Little interest or pleasure in doing things Not at all   Q2: Feeling down, depressed or hopeless Not at all   PHQ-2 Score 0           Have you ever done Advance Care Planning? (For example, a Health Directive, POLST, or a discussion with a medical provider or your loved ones about your wishes): Yes, advance care planning is on file.       Fall risk  Fallen 2 or more times in the past year?: No  Any fall with injury in the past year?: No    Cognitive Screening   1) Repeat 3 items (Leader, Season, Table)    2) Clock draw: NORMAL  3) 3 item recall: Recalls 3 objects  Results: 3 items recalled: COGNITIVE IMPAIRMENT LESS LIKELY    Mini-CogTM Copyright S [employer]. Licensed by the author for use in " [facility]; reprinted with permission (aristeo@[facility]). All rights reserved.      Do you have sleep apnea, excessive snoring or daytime drowsiness? : no    Reviewed and updated as needed this visit by clinical staff   Tobacco  Allergies  Meds  Problems  Med Hx  Surg Hx  Fam Hx          Reviewed and updated as needed this visit by Provider   Tobacco  Allergies  Meds  Problems  Med Hx  Surg Hx  Fam Hx         Social History     Tobacco Use    Smoking status: Former     Packs/day: 1.50     Years: 13.00     Additional pack years: 0.00     Total pack years: 19.50     Types: Cigarettes     Start date: 9/10/1967     Quit date: 1/15/1979     Years since quittin.0     Passive exposure: Past    Smokeless tobacco: Never   Substance Use Topics    Alcohol use: Not Currently             2023     9:04 AM   Alcohol Use   Prescreen: >3 drinks/day or >7 drinks/week? No     Do you have a current opioid prescription? No  Do you use any other controlled substances or medications that are not prescribed by a provider? None        PROBLEMS TO ADD ON...  Review Diabetes   Sometimes having higher blood glucose readings in the morning.   Averaging high 100's for the most part       Current providers sharing in care for this patient include:     Patient Care Team:  Jus Rodney MD as PCP - General  Jus Rodney MD as Assigned PCP  Lauren Obrien, RN as Registered Nurse (Diabetes Education)  Toan Ramírez, PharmD as Pharmacist (Pharmacist)  Family Medicine, Physician, MD as MD (Family Practice)  Kelly Vega RPH as Pharmacist  Kelly Vega RPH as Pharmacist (Pharmacist)    The following health maintenance items are reviewed in Epic and correct as of today:  Health Maintenance   Topic Date Due    DEXA  2023    DTAP/TDAP/TD IMMUNIZATION (3 - Td or Tdap) 2024    ANNUAL REVIEW OF HM ORDERS  2024    A1C  2024    MICROALBUMIN  2024    DIABETIC FOOT EXAM   "06/22/2024    EYE EXAM  07/10/2024    COLORECTAL CANCER SCREENING  10/11/2024    MEDICARE ANNUAL WELLNESS VISIT  01/05/2025    BMP  01/05/2025    LIPID  01/05/2025    FALL RISK ASSESSMENT  01/05/2025    MAMMO SCREENING  08/15/2025    ADVANCE CARE PLANNING  01/05/2029    HEPATITIS C SCREENING  Completed    PHQ-2 (once per calendar year)  Completed    INFLUENZA VACCINE  Completed    Pneumococcal Vaccine: 65+ Years  Completed    ZOSTER IMMUNIZATION  Completed    RSV VACCINE (Pregnancy & 60+)  Completed    COVID-19 Vaccine  Completed    IPV IMMUNIZATION  Aged Out    HPV IMMUNIZATION  Aged Out    MENINGITIS IMMUNIZATION  Aged Out    RSV MONOCLONAL ANTIBODY  Aged Out     Lab work is in process          Review of Systems   Constitutional:  Negative for chills and fever.   HENT:  Negative for congestion, ear pain, hearing loss and sore throat.    Eyes:  Positive for visual disturbance. Negative for pain.   Respiratory:  Negative for cough and shortness of breath.    Cardiovascular:  Negative for chest pain, palpitations and peripheral edema.   Gastrointestinal:  Negative for abdominal pain, constipation, diarrhea, heartburn, hematochezia and nausea.   Breasts:  Negative for tenderness, breast mass and discharge.   Genitourinary:  Negative for dysuria, frequency, genital sores, hematuria, pelvic pain, urgency, vaginal bleeding and vaginal discharge.   Musculoskeletal:  Negative for arthralgias, joint swelling and myalgias.   Skin:  Negative for rash.   Neurological:  Negative for dizziness, weakness, headaches and paresthesias.   Psychiatric/Behavioral:  Negative for mood changes. The patient is not nervous/anxious.          OBJECTIVE:   BP (!) 148/80 (BP Location: Left arm, Patient Position: Sitting, Cuff Size: Adult Regular)   Pulse 70   Temp 97.6  F (36.4  C) (Oral)   Resp 16   Ht 1.64 m (5' 4.57\")   Wt 67.4 kg (148 lb 11.2 oz)   LMP  (LMP Unknown)   SpO2 98%   BMI 25.08 kg/m   Estimated body mass index is 25.08 " "kg/m  as calculated from the following:    Height as of this encounter: 1.64 m (5' 4.57\").    Weight as of this encounter: 67.4 kg (148 lb 11.2 oz).  Physical Exam  GENERAL APPEARANCE: healthy, alert and no distress  EYES: Eyes grossly normal to inspection, PERRL and conjunctivae and sclerae normal  HENT: oropharynx clear and oral mucous membranes moist  NECK: no adenopathy, no asymmetry, masses, or scars and thyroid normal to palpation  RESP: lungs clear to auscultation - no rales, rhonchi or wheezes  CV: regular rate and rhythm, normal S1 S2, no S3 or S4, no murmur, click or rub, no peripheral edema and peripheral pulses strong  ABDOMEN: soft, nontender, no hepatosplenomegaly, no masses and bowel sounds normal  MS: no musculoskeletal defects are noted and gait is age appropriate without ataxia  SKIN: no suspicious lesions or rashes  NEURO: Normal strength and tone, sensory exam grossly normal, mentation intact and speech normal  PSYCH: mentation appears normal and affect normal/bright        ASSESSMENT / PLAN:     Problem List Items Addressed This Visit       Osteopenia of multiple sites    Relevant Orders    DX Hip/Pelvis/Spine    Type 2 diabetes mellitus with diabetic neuropathy, with long-term current use of insulin (H)     The A1c has improved down to 7.9    Novolog: going with the sliding scale adding up to 4 units   Lantus 8 in am and 1-2 at night   Metformin 1000 mg bid     BG averagin below 180  Sometimes having higher blood glucose readings in the morning.     Plan:   May adjust Lantus up by 1-2 units   Continue novolog and metformin as is           Special screening for malignant neoplasms, colon     Other Visit Diagnoses       Encounter for Medicare annual wellness exam    -  Primary    Relevant Orders    CBC with platelets (Completed)    TSH with free T4 reflex (Completed)    Other hyperlipidemia        Relevant Orders    Lipid panel reflex to direct LDL Fasting            Patient has been advised of " "split billing requirements and indicates understanding: Yes      COUNSELING:  Reviewed preventive health counseling, as reflected in patient instructions      BMI:   Estimated body mass index is 25.08 kg/m  as calculated from the following:    Height as of this encounter: 1.64 m (5' 4.57\").    Weight as of this encounter: 67.4 kg (148 lb 11.2 oz).         She reports that she quit smoking about 45 years ago. Her smoking use included cigarettes. She started smoking about 56 years ago. She has a 19.5 pack-year smoking history. She has been exposed to tobacco smoke. She has never used smokeless tobacco.      Appropriate preventive services were discussed with this patient, including applicable screening as appropriate for fall prevention, nutrition, physical activity, Tobacco-use cessation, weight loss and cognition.  Checklist reviewing preventive services available has been given to the patient.    Reviewed patients plan of care and provided an AVS. The Basic Care Plan (routine screening as documented in Health Maintenance) for Nancy meets the Care Plan requirement. This Care Plan has been established and reviewed with the Patient.        Jus Rodney MD  [facility]    Identified Health Risks:  I have reviewed Opioid Use Disorder and Substance Use Disorder risk factors and made any needed referrals.   "

## 2024-01-05 NOTE — ASSESSMENT & PLAN NOTE
The A1c has improved down to 7.9    Novolog: going with the sliding scale adding up to 4 units   Lantus 8 in am and 1-2 at night   Metformin 1000 mg bid     BG averagin below 180  Sometimes having higher blood glucose readings in the morning.     Plan:   May adjust Lantus up by 1-2 units   Continue novolog and metformin as is

## 2024-01-18 ENCOUNTER — HOSPITAL ENCOUNTER (OUTPATIENT)
Dept: BONE DENSITY | Facility: HOSPITAL | Age: 74
Discharge: HOME | End: 2024-01-18
Attending: FAMILY MEDICINE | Admitting: FAMILY MEDICINE
Payer: COMMERCIAL

## 2024-01-18 DIAGNOSIS — M85.89 OSTEOPENIA OF MULTIPLE SITES: ICD-10-CM

## 2024-01-18 PROCEDURE — 77080 DXA BONE DENSITY AXIAL: CPT

## 2024-02-01 ENCOUNTER — TELEPHONE (OUTPATIENT)
Dept: FAMILY MEDICINE | Facility: CLINIC | Age: 74
End: 2024-02-01
Payer: COMMERCIAL

## 2024-02-01 DIAGNOSIS — E11.40 TYPE 2 DIABETES MELLITUS WITH DIABETIC NEUROPATHY, WITH LONG-TERM CURRENT USE OF INSULIN (H): ICD-10-CM

## 2024-02-01 DIAGNOSIS — Z79.4 TYPE 2 DIABETES MELLITUS WITH DIABETIC NEUROPATHY, WITH LONG-TERM CURRENT USE OF INSULIN (H): ICD-10-CM

## 2024-02-01 NOTE — TELEPHONE ENCOUNTER
Patient calling to state she needs a prior auth for 3 units on her Trulicity   -Has been taking 3 Units, new formulary insurance only authorizing 2 so need prior auth   --------------------------------------------------------------  Patients reader rene 2 giving trouble  -as of recent her reader hasn't been scanning, no jorge a reading     Rene said the unit may have a bad sensor    -tried another reader they sent her and it still didn't work  -They stated it could be [email or web address] software  -Patient has updated software and Rene said they are updated with that software now    what is the recommendation for the patient      doesn't want to go back to finger pricks     Contact Information  [phone] (Home Phone)

## 2024-02-01 NOTE — TELEPHONE ENCOUNTER
Please advise on PA for Trulicity. Patient last seen on 1/5/24. Please note below regarding DM         Type 2 diabetes mellitus with diabetic neuropathy, with long-term current use of insulin (H)        The A1c has improved down to 7.9     Novolog: going with the sliding scale adding up to 4 units   Lantus 8 in am and 1-2 at night   Metformin 1000 mg bid      BG averagin below 180  Sometimes having higher blood glucose readings in the morning.      Plan:   May adjust Lantus up by 1-2 units   Continue novolog and metformin as is

## 2024-02-05 RX ORDER — DULAGLUTIDE 3 MG/.5ML
3 INJECTION, SOLUTION SUBCUTANEOUS
Qty: 6 ML | Refills: 1 | Status: SHIPPED | OUTPATIENT
Start: 2024-02-05 | End: 2024-02-07

## 2024-02-05 NOTE — TELEPHONE ENCOUNTER
Central Prior Authorization Team - Phone: [phone]     Prior Authorization Not Needed per Insurance- approval already on file til 12/31/24    Medication: TRULICITY 3 MG/0.5ML SC SOPN  Insurance Company: [employer] - Phone [phone] Fax [phone]  Expected CoPay: $    Pharmacy Filling the Rx: [facility] - [address]  Pharmacy Notified: yes  Patient Notified: yes pharmacy will notify when ready

## 2024-02-07 ENCOUNTER — MYC MEDICAL ADVICE (OUTPATIENT)
Dept: FAMILY MEDICINE | Facility: CLINIC | Age: 74
End: 2024-02-07
Payer: COMMERCIAL

## 2024-02-07 DIAGNOSIS — E11.40 TYPE 2 DIABETES MELLITUS WITH DIABETIC NEUROPATHY, WITH LONG-TERM CURRENT USE OF INSULIN (H): ICD-10-CM

## 2024-02-07 DIAGNOSIS — Z79.4 TYPE 2 DIABETES MELLITUS WITH DIABETIC NEUROPATHY, WITH LONG-TERM CURRENT USE OF INSULIN (H): ICD-10-CM

## 2024-02-07 DIAGNOSIS — M85.89 OSTEOPENIA OF MULTIPLE SITES: ICD-10-CM

## 2024-02-07 DIAGNOSIS — E10.9 TYPE 1 DIABETES MELLITUS WITHOUT COMPLICATION (H): ICD-10-CM

## 2024-02-07 DIAGNOSIS — I10 ESSENTIAL HYPERTENSION, BENIGN: ICD-10-CM

## 2024-02-07 RX ORDER — DULAGLUTIDE 3 MG/.5ML
3 INJECTION, SOLUTION SUBCUTANEOUS
Qty: 6 ML | Refills: 1 | Status: SHIPPED | OUTPATIENT
Start: 2024-02-07 | End: 2024-07-22

## 2024-02-07 RX ORDER — METOPROLOL SUCCINATE 100 MG/1
100 TABLET, EXTENDED RELEASE ORAL DAILY
Qty: 90 TABLET | Refills: 3 | Status: SHIPPED | OUTPATIENT
Start: 2024-02-07

## 2024-02-07 RX ORDER — AMLODIPINE BESYLATE 5 MG/1
7.5 TABLET ORAL DAILY
Qty: 135 TABLET | Refills: 3 | Status: SHIPPED | OUTPATIENT
Start: 2024-02-07

## 2024-02-07 RX ORDER — ALENDRONATE SODIUM 70 MG/1
70 TABLET ORAL
Qty: 12 TABLET | Refills: 3 | Status: SHIPPED | OUTPATIENT
Start: 2024-02-07

## 2024-02-07 RX ORDER — INSULIN GLARGINE 100 [IU]/ML
13 INJECTION, SOLUTION SUBCUTANEOUS AT BEDTIME
Qty: 11.7 ML | Refills: 1 | Status: SHIPPED | OUTPATIENT
Start: 2024-02-07

## 2024-02-07 RX ORDER — GLUCOSAMINE HCL/CHONDROITIN SU 500-400 MG
CAPSULE ORAL
Qty: 300 STRIP | Refills: 0 | Status: SHIPPED | OUTPATIENT
Start: 2024-02-07

## 2024-02-07 RX ORDER — INSULIN ASPART 100 [IU]/ML
10-17 INJECTION, SOLUTION INTRAVENOUS; SUBCUTANEOUS
Qty: 60 ML | Refills: 0 | Status: SHIPPED | OUTPATIENT
Start: 2024-02-07

## 2024-02-07 NOTE — TELEPHONE ENCOUNTER
Patient is requesting switch in mail order pharmacies. New order needs to be sent.    Rene sensors  Routing refill request to provider for review/approval because:  Drug not on the [facility] refill protocol     Last Written Prescription Date:  23  Last Fill Quantity: 6,  # refills: 3   Last office visit provider:  24     Gabapentin  Routing refill request to provider for review/approval because:  Drug not on the [facility] refill protocol     Last Written Prescription Date:  23  Last Fill Quantity: 90,  # refills: 3   Last office visit provider:  24     Magnesium  Routing refill request to provider for review/approval because:  Drug not on the [facility] refill protocol     Last Written Prescription Date:  22  Last Fill Quantity: 180,  # refills: 0   Last office visit provider:  24   Requested Prescriptions   Pending Prescriptions Disp Refills    Continuous Blood Gluc Sensor (FREESTYLE RENE 2 SENSOR) MISC 6 each 3     Si each every 14 days Use 1 sensor every 14 days. Use to read blood sugars per 's instructions.       There is no refill protocol information for this order       gabapentin (NEURONTIN) 300 MG capsule 90 capsule 3     Sig: Take 1 capsule (300 mg) by mouth at bedtime       There is no refill protocol information for this order       Magnesium Oxide 250 MG TABS 180 tablet 0     Sig: Take 1 tablet (250 mg) by mouth 2 times daily       There is no refill protocol information for this order      Signed Prescriptions Disp Refills    alendronate (FOSAMAX) 70 MG tablet 12 tablet 3     Sig: Take 1 tablet (70 mg) by mouth every 7 days       Bisphosphonates Passed - 2024  5:07 PM        Passed - Dexa scan completed in the past 48-months     Please review last Dexa result.           Passed - Medication is active on med list        Passed - Medication indicated for associated diagnosis     The medication is prescribed for one or more of the following conditions:      Osteoporosis   Osteitis Deformans (Paget's Disease)   Postmenopausal    Osteopenia   Arthroplasty   Crohn's Disease   Cystic Fibrosis   Fibrous Dysplasia of bone   Growth Hormone Deficiency   Hypercalcemia   Juvenile Osteoporosis   Hypogonadism          Passed - Recent (12 mo) or future (90 days) visit within the authorizing provider's specialty     The patient must have completed an in-person or virtual visit within the past 12 months or has a future visit scheduled within the next 90 days with the authorizing provider s specialty.  Urgent care and e-visits do not quality as an office visit for this protocol.          Passed - Patient is age 18 or older        Passed - Normal Creatinine Clearance  within past 12 months     Recent Labs   Lab Test 01/05/24  1008   CR 0.53       Ok to refill medication if creatinine is low            amLODIPine (NORVASC) 5 MG tablet 135 tablet 3     Sig: Take 1.5 tablets (7.5 mg) by mouth daily       Calcium Channel Blockers Protocol  Failed - 2/7/2024  5:07 PM        Failed - Blood pressure under 140/90 in past 12 months     BP Readings from Last 3 Encounters:   01/05/24 (!) 148/80   12/21/23 132/74   06/22/23 138/76                 Passed - Recent (12 mo) or future (30 days) visit within the authorizing provider's specialty     The patient must have completed an in-person or virtual visit within the past 12 months or has a future visit scheduled within the next 90 days with the authorizing provider s specialty.  Urgent care and e-visits do not quality as an office visit for this protocol.          Passed - Medication is active on med list        Passed - Patient is age 18 or older        Passed - No active pregnancy on record        Passed - Normal serum creatinine on file in past 12 months     Recent Labs   Lab Test 01/05/24  1008   CR 0.53       Ok to refill medication if creatinine is low          Passed - No positive pregnancy test in past 12 months          Dulaglutide  "(TRULICITY) 3 MG/0.5ML SOPN 6 mL 1     Sig: Inject 3 mg Subcutaneous every 7 days       GLP-1 Agonists Protocol Passed - 2/7/2024  5:07 PM        Passed - HgbA1C in past 3 or 6 months     If HgbA1C is 8 or greater, it needs to be on file within the past 3 months.  If less than 8, must be on file within the past 6 months.     Recent Labs   Lab Test 12/18/23  1001   A1C 7.9*             Passed - Medication is active on med list        Passed - Has GFR on file in past 12 months and most recent value is normal        Passed - Recent (6 mo) or future (90 days) visit within the authorizing provider's specialty     The patient must have completed an in-person or virtual visit within the past 6 months or has a future visit scheduled within the next 90 days with the authorizing provider s specialty.  Urgent care and e-visits do not quality as an office visit for this protocol.          Passed - Medication indicated for associated diagnosis     Medication is associated with one or more of the following diagnoses:     Type 2 diabetes mellitus           Passed - Patient is age 18 or older        Passed - No active pregnancy on record        Passed - No positive pregnancy test in past 12 months          Glucose Blood (BLOOD GLUCOSE TEST STRIPS) STRP 300 strip 0     Sig: [BLOOD GLUCOSE TEST STRIPS] Dispense brand per patient's insurance at pharmacy discretion. Patient test  3 times a day.       Diabetic Supplies Protocol Passed - 2/7/2024  5:07 PM        Passed - Medication is active on med list        Passed - Medication indicated for associated diagnosis        Passed - Patient is 18 years of age or older        Passed - Recent (6 mo) or future (30 days) visit within the authorizing provider's specialty     Patient had office visit in the last 6 months or has a visit in the next 30 days with authorizing provider.  See \"Patient Info\" tab in inbasket, or \"Choose Columns\" in Meds & Orders section of the refill encounter.           "    insulin aspart (NOVOLOG FLEXPEN RELION) 100 UNIT/ML pen 60 mL 0     Sig: Inject 10-17 Units Subcutaneous 3 times daily (with meals)       Insulin Protocol Failed - 2/7/2024  5:07 PM        Failed - Chart Review Required     Review Chart.    Do not approve if insulin is used in a pump.  Instead, direct refill request to the patient's endocrinologist.  If the patient doesn't have an endocrinologist, then send the refill to the patient's PCP for review            Passed - Medication is active on med list        Passed - Has GFR on file in past 12 months and most recent value is normal        Passed - Recent (6 mo) or future (90 days) visit within the authorizing provider's specialty     The patient must have completed an in-person or virtual visit within the past 6 months or has a future visit scheduled within the next 90 days with the authorizing provider s specialty.  Urgent care and e-visits do not quality as an office visit for this protocol.          Passed - Medication indicated for associated diagnosis     Medication is associated with one or more of the following diagnoses:   - Type 1 diabetes mellitus  - Type 2 diabetes mellitus  - Diabetic nephropathy; Prophylaxis  - Neuropathy due to diabetes mellitus; Prophylaxis  - Retinopathy due to diabetes mellitus; Prophylaxis  - Diabetes mellitus associated with cystic fibrosis  - Disorder of cardiovascular system; Prophylaxis - Type 1 diabetes mellitus   - Disorder of cardiovascular system; Prophylaxis - Type 2 diabetes mellitus            Passed - Patient is 18 years of age or older       Short Acting Insulin Protocol Passed - 2/7/2024  5:07 PM        Passed - Serum creatinine on file in past 12 months     Recent Labs   Lab Test 01/05/24  1008   CR 0.53       Ok to refill medication if creatinine is low          Passed - HgbA1C in past 3 or 6 months     If HgbA1C is 8 or greater, it needs to be on file within the past 3 months.  If less than 8, must be on file  "within the past 6 months.     Recent Labs   Lab Test 12/18/23  1001   A1C 7.9*             Passed - Medication is active on med list        Passed - Patient is age 18 or older        Passed - Recent (6 mo) or future (30 days) visit within the authorizing provider's specialty     Patient had office visit in the last 6 months or has a visit in the next 30 days with authorizing provider or within the authorizing provider's specialty.  See \"Patient Info\" tab in inbasket, or \"Choose Columns\" in Meds & Orders section of the refill encounter.              insulin glargine (LANTUS SOLOSTAR) 100 UNIT/ML pen 11.7 mL 1     Sig: Inject 13 Units Subcutaneous at bedtime Dispense with applicable Pen needles       Insulin Protocol Failed - 2/7/2024  5:07 PM        Failed - Chart Review Required     Review Chart.    Do not approve if insulin is used in a pump.  Instead, direct refill request to the patient's endocrinologist.  If the patient doesn't have an endocrinologist, then send the refill to the patient's PCP for review            Passed - Medication is active on med list        Passed - Has GFR on file in past 12 months and most recent value is normal        Passed - Recent (6 mo) or future (90 days) visit within the authorizing provider's specialty     The patient must have completed an in-person or virtual visit within the past 6 months or has a future visit scheduled within the next 90 days with the authorizing provider s specialty.  Urgent care and e-visits do not quality as an office visit for this protocol.          Passed - Medication indicated for associated diagnosis     Medication is associated with one or more of the following diagnoses:   - Type 1 diabetes mellitus  - Type 2 diabetes mellitus  - Diabetic nephropathy; Prophylaxis  - Neuropathy due to diabetes mellitus; Prophylaxis  - Retinopathy due to diabetes mellitus; Prophylaxis  - Diabetes mellitus associated with cystic fibrosis  - Disorder of cardiovascular " "system; Prophylaxis - Type 1 diabetes mellitus   - Disorder of cardiovascular system; Prophylaxis - Type 2 diabetes mellitus            Passed - Patient is 18 years of age or older       Long Acting Insulin Protocol Passed - 2/7/2024  5:07 PM        Passed - Serum creatinine on file in past 12 months     Recent Labs   Lab Test 01/05/24  1008   CR 0.53       Ok to refill medication if creatinine is low          Passed - HgbA1C in past 3 or 6 months     If HgbA1C is 8 or greater, it needs to be on file within the past 3 months.  If less than 8, must be on file within the past 6 months.     Recent Labs   Lab Test 12/18/23  1001   A1C 7.9*             Passed - Medication is active on med list        Passed - Patient is age 18 or older        Passed - Recent (6 mo) or future (30 days) visit within the authorizing provider's specialty     Patient had office visit in the last 6 months or has a visit in the next 30 days with authorizing provider or within the authorizing provider's specialty.  See \"Patient Info\" tab in inbasket, or \"Choose Columns\" in Meds & Orders section of the refill encounter.              metFORMIN (GLUCOPHAGE) 1000 MG tablet 180 tablet 3     Sig: Take 1 tablet (1,000 mg) by mouth 2 times daily (with meals)       Biguanide Agents Passed - 2/7/2024  5:07 PM        Passed - Patient is age 10 or older        Passed - Patient has documented A1c within the specified period of time.     If HgbA1C is 8 or greater, it needs to be on file within the past 3 months.  If less than 8, must be on file within the past 6 months.     Recent Labs   Lab Test 12/18/23  1001   A1C 7.9*             Passed - Patient does NOT have a diagnosis of CHF.        Passed - Medication is active on med list        Passed - Medication indicated for associated diagnosis     Medication is associated with one or more of the following diagnoses:     Gestational diabetes mellitus     Hyperinsulinar obesity     Hypersecretion of ovarian " androgens    Non-alcoholic fatty liver    Polycystic ovarian syndrome               Pre-diabetes (DM 2 prevention)    Type 2 diabetes mellitus     Weight gain, antipsychotic therapy-induced             Passed - Has GFR on file in past 12 months and most recent value is normal        Passed - Recent (6 mo) or future (90 days) visit within the authorizing provider's specialty     The patient must have completed an in-person or virtual visit within the past 6 months or has a future visit scheduled within the next 90 days with the authorizing provider s specialty.  Urgent care and e-visits do not quality as an office visit for this protocol.          Passed - Patient is not pregnant        Passed - Patient has not had a positive pregnancy test within the past 12 mos.           metoprolol succinate ER (TOPROL XL) 100 MG 24 hr tablet 90 tablet 3     Sig: Take 1 tablet (100 mg) by mouth daily       Beta-Blockers Protocol Failed - 2/7/2024  5:07 PM        Failed - Blood pressure under 140/90 in past 12 months     BP Readings from Last 3 Encounters:   01/05/24 (!) 148/80   12/21/23 132/74   06/22/23 138/76                 Passed - Patient is age 6 or older        Passed - Recent (12 mo) or future (30 days) visit within the authorizing provider's specialty     The patient must have completed an in-person or virtual visit within the past 12 months or has a future visit scheduled within the next 90 days with the authorizing provider s specialty.  Urgent care and e-visits do not quality as an office visit for this protocol.          Passed - Medication is active on med list        Passed - Medication indicated for associated diagnosis     Medication is associated with one or more of the following diagnoses:     Hypertension (HTN)   Atrial fibrillation/flutter   Angina   ASCVD   Migraine   Heart Failure   Tremor   Anxiety   Ocular hypertension   Glaucoma               Joy Moncada RN 02/07/24 5:10 PM

## 2024-02-12 RX ORDER — GABAPENTIN 300 MG/1
300 CAPSULE ORAL AT BEDTIME
Qty: 90 CAPSULE | Refills: 3 | Status: SHIPPED | OUTPATIENT
Start: 2024-02-12

## 2024-03-15 ENCOUNTER — TRANSFERRED RECORDS (OUTPATIENT)
Dept: MULTI SPECIALTY CLINIC | Facility: CLINIC | Age: 74
End: 2024-03-15

## 2024-03-15 LAB — RETINOPATHY: NORMAL

## 2024-04-08 ENCOUNTER — TELEPHONE (OUTPATIENT)
Dept: FAMILY MEDICINE | Facility: CLINIC | Age: 74
End: 2024-04-08
Payer: COMMERCIAL

## 2024-04-08 NOTE — TELEPHONE ENCOUNTER
"Educated Nancy that according to our resources \"If you forget to take a dose on time, take it as soon as you remember. If it is almost time for the next dose, do not take the missed dose. Return to your normal schedule. Do not take 2 doses at one time.\"    It would be ok for her to take her missed dose now, educated that it can make her more drowsy throughout the day. Pt states understanding and will try to take now.       "

## 2024-04-08 NOTE — TELEPHONE ENCOUNTER
Pt calling, all RN's on phone at the time of the call, pt is stating she missed a dose of her Gabapentin last night, she would like a call back from RN or someone on PCPs care team.

## 2024-05-30 ENCOUNTER — TELEPHONE (OUTPATIENT)
Dept: FAMILY MEDICINE | Facility: CLINIC | Age: 74
End: 2024-05-30
Payer: COMMERCIAL

## 2024-05-30 NOTE — TELEPHONE ENCOUNTER
Medication Question or Refill        What medication are you calling about (include dose and sig)?:     Dulaglutide (TRULICITY) 3 MG/0.5ML SOPN     Preferred Pharmacy:  [id number] - [place], [place] - [address]  [address]  [place] [address]  Phone: [phone] Fax: [phone]    Controlled Substance Agreement on file:   CSA -- Patient Level:    CSA: None found at the patient level.       Who prescribed the medication?: PCP    Do you need a refill? Yes, SOON    When did you use the medication last? 2/7/2024 prescribed     Patient offered an appointment? No    Do you have any questions or concerns?  Yes: Supply chain has been out     -What should the patient be doing going forward medication wise?  -New pharmacy?  -Medication?    Patient has 3 weeks left     Could we send this information to you in [facility] or would you prefer to receive a phone call?:   Patient would prefer a phone call   Okay to leave a detailed message?: No at Cell number on file:    Telephone Information:   Mobile [phone]

## 2024-05-31 ENCOUNTER — MYC MEDICAL ADVICE (OUTPATIENT)
Dept: FAMILY MEDICINE | Facility: CLINIC | Age: 74
End: 2024-05-31
Payer: COMMERCIAL

## 2024-05-31 NOTE — TELEPHONE ENCOUNTER
Pt has been communicating through [facility] regarding this. Will close encounter.       Aaron Hameed Jr., CMA on 5/31/2024 at 5:24 PM

## 2024-06-06 ENCOUNTER — LAB (OUTPATIENT)
Dept: LAB | Facility: CLINIC | Age: 74
End: 2024-06-06
Payer: COMMERCIAL

## 2024-06-06 DIAGNOSIS — E11.319 DIABETIC RETINOPATHY ASSOCIATED WITH TYPE 2 DIABETES MELLITUS, MACULAR EDEMA PRESENCE UNSPECIFIED, UNSPECIFIED LATERALITY, UNSPECIFIED RETINOPATHY SEVERITY (H): ICD-10-CM

## 2024-06-06 LAB — HBA1C MFR BLD: 8.1 % (ref 0–5.6)

## 2024-06-06 PROCEDURE — 83036 HEMOGLOBIN GLYCOSYLATED A1C: CPT

## 2024-06-06 PROCEDURE — 36415 COLL VENOUS BLD VENIPUNCTURE: CPT

## 2024-06-10 ENCOUNTER — OFFICE VISIT (OUTPATIENT)
Dept: FAMILY MEDICINE | Facility: CLINIC | Age: 74
End: 2024-06-10
Payer: COMMERCIAL

## 2024-06-10 VITALS
SYSTOLIC BLOOD PRESSURE: 138 MMHG | WEIGHT: 151.5 LBS | OXYGEN SATURATION: 99 % | TEMPERATURE: 97.9 F | HEIGHT: 65 IN | DIASTOLIC BLOOD PRESSURE: 70 MMHG | HEART RATE: 71 BPM | RESPIRATION RATE: 18 BRPM | BODY MASS INDEX: 25.24 KG/M2

## 2024-06-10 DIAGNOSIS — E11.40 TYPE 2 DIABETES MELLITUS WITH DIABETIC NEUROPATHY, WITH LONG-TERM CURRENT USE OF INSULIN (H): Primary | ICD-10-CM

## 2024-06-10 DIAGNOSIS — L98.9 SKIN LESION: ICD-10-CM

## 2024-06-10 DIAGNOSIS — Z13.29 SCREENING FOR THYROID DISORDER: ICD-10-CM

## 2024-06-10 DIAGNOSIS — Z79.4 TYPE 2 DIABETES MELLITUS WITH DIABETIC NEUROPATHY, WITH LONG-TERM CURRENT USE OF INSULIN (H): Primary | ICD-10-CM

## 2024-06-10 PROCEDURE — 99214 OFFICE O/P EST MOD 30 MIN: CPT | Performed by: FAMILY MEDICINE

## 2024-06-10 PROCEDURE — G2211 COMPLEX E/M VISIT ADD ON: HCPCS | Performed by: FAMILY MEDICINE

## 2024-06-10 ASSESSMENT — PAIN SCALES - GENERAL: PAINLEVEL: NO PAIN (0)

## 2024-06-10 NOTE — PROGRESS NOTES
"  Assessment & Plan   Problem List Items Addressed This Visit       Type 2 diabetes mellitus with diabetic neuropathy, with long-term current use of insulin (H) - Primary     Most recent A1c 8.1, up from 7.9 6 months ago.     Current meds:  Novolog: use per sliding scale  Lantus 10 in the am  Metformin 1000 mg bid   Trulicity 3 mg injection weekly    BG averaging 200-220  Sometimes higher BG in the mornings  No longer having hypoglycemia overnights      Plan:     Adjust Lantus up 1-2 units in the mornings to keep Blood glucose goal below 150 average    Continue novolog, metformin, and Trulicity as is  Diabetic lab recheck and follow up in 6 months         Relevant Orders    Albumin Random Urine Quantitative with Creat Ratio    Comprehensive metabolic panel (BMP + Alb, Alk Phos, ALT, AST, Total. Bili, TP)    Lipid panel reflex to direct LDL Fasting    Hemoglobin A1c    CBC with platelets     Other Visit Diagnoses       Screening for thyroid disorder        Relevant Orders    TSH with free T4 reflex    Skin lesion        Located in the scalp  Appearance suggestive of Ric K  Assurance given routine exam advised             The longitudinal plan of care for the diagnosis(es)/condition(s) as documented were addressed during this visit. Due to the added complexity in care, I will continue to support Nancy in the subsequent management and with ongoing continuity of care.      BMI  Estimated body mass index is 25.6 kg/m  as calculated from the following:    Height as of this encounter: 1.638 m (5' 4.5\").    Weight as of this encounter: 68.7 kg (151 lb 8 oz).             Subjective   Nancy is a 73 year old, presenting for the following health issues:  Diabetes (Diabetes follow-up. /No questions or concerns for the provider. )    Also noting a spot on her scalp to be checked      6/10/2024    11:14 AM   Additional Questions   Roomed by Matthew England MA   Accompanied by        Via the Health Maintenance questionnaire, " the patient has reported the following services have been completed -Eye Exam: [facility] 2024-03-15, this information has been sent to the abstraction team.  History of Present Illness       Diabetes:   She presents for follow up of diabetes.   She is checking home blood glucose with a continuous glucose monitor.   She checks blood glucose before meals and at bedtime.  Blood glucose is sometimes over 200 and sometimes under 70. She is aware of hypoglycemia symptoms including weakness and other.   She is concerned about blood sugar frequently over 200.   She is having burning in feet.  The patient has had a diabetic eye exam in the last 12 months. Eye exam performed on March. Location of last eye exam [facility].        She eats 2-3 servings of fruits and vegetables daily.She consumes 0 sweetened beverage(s) daily.She exercises with enough effort to increase her heart rate 20 to 29 minutes per day.  She exercises with enough effort to increase her heart rate 6 days per week.   She is taking medications regularly.         Diabetes Follow-up    How often are you checking your blood sugar? Four or more times daily  Blood sugar testing frequency justification:  Patient modifying lifestyle changes (diet, exercise) with blood sugars  What time of day are you checking your blood sugars (select all that apply)?  Before meals and before bed time.   Have you had any blood sugars above 200?  Yes   Have you had any blood sugars below 70?  Yes   What symptoms do you notice when your blood sugar is low?  Weak  What concerns do you have today about your diabetes? None   Do you have any of these symptoms? (Select all that apply)  Burning in feet, excessive thirst and weight gain.       BP Readings from Last 2 Encounters:   06/10/24 138/70   01/05/24 (!) 148/80     Hemoglobin A1C (%)   Date Value   06/06/2024 8.1 (H)   12/18/2023 7.9 (H)     LDL Cholesterol Calculated (mg/dL)   Date Value   01/05/2024 76   12/01/2022 79  "    LDL Cholesterol Direct (mg/dl)   Date Value   08/15/2018 110   01/23/2017 95         How many servings of fruits and vegetables do you eat daily?  2-3  On average, how many sweetened beverages do you drink each day (Examples: soda, juice, sweet tea, etc.  Do NOT count diet or artificially sweetened beverages)?   0  How many days per week do you exercise enough to make your heart beat faster? 6  How many minutes a day do you exercise enough to make your heart beat faster? 20 - 29  How many days per week do you miss taking your medication? 0            Objective    /70 (BP Location: Right arm, Patient Position: Sitting, Cuff Size: Adult Regular)   Pulse 71   Temp 97.9  F (36.6  C) (Oral)   Resp 18   Ht 1.638 m (5' 4.5\")   Wt 68.7 kg (151 lb 8 oz)   LMP  (LMP Unknown)   SpO2 99%   BMI 25.60 kg/m    Body mass index is 25.6 kg/m .  Physical Exam   Scalp: Ric K lesion noted            Signed Electronically by: Jus Rodney MD    "

## 2024-06-10 NOTE — ASSESSMENT & PLAN NOTE
Most recent A1c 8.1, up from 7.9 6 months ago.     Current meds:  Novolog: use per sliding scale  Lantus 10 in the am  Metformin 1000 mg bid   Trulicity 3 mg injection weekly    BG averaging 200-220  Sometimes higher BG in the mornings  No longer having hypoglycemia overnights      Plan:     Adjust Lantus up 1-2 units in the mornings to keep Blood glucose goal below 150 average    Continue novolog, metformin, and Trulicity as is  Diabetic lab recheck and follow up in 6 months

## 2024-07-22 DIAGNOSIS — Z79.4 TYPE 2 DIABETES MELLITUS WITH DIABETIC NEUROPATHY, WITH LONG-TERM CURRENT USE OF INSULIN (H): ICD-10-CM

## 2024-07-22 DIAGNOSIS — E11.40 TYPE 2 DIABETES MELLITUS WITH DIABETIC NEUROPATHY, WITH LONG-TERM CURRENT USE OF INSULIN (H): ICD-10-CM

## 2024-07-22 RX ORDER — DULAGLUTIDE 3 MG/.5ML
3 INJECTION, SOLUTION SUBCUTANEOUS
Qty: 6 ML | Refills: 0 | Status: SHIPPED | OUTPATIENT
Start: 2024-07-22 | End: 2024-10-04

## 2024-08-16 NOTE — PROGRESS NOTES
Called and s/w pt mother. Mother declined appointment stating she would not be able to make that time today.    Please advise/   Nancy is a 72 year old who is being evaluated via a billable video visit.      How would you like to obtain your AVS? MyChart  If the video visit is dropped, the invitation should be resent by: Text to cell phone: [phone]  Will anyone else be joining your video visit? No          Assessment & Plan     Type 2 diabetes mellitus with diabetic neuropathy, with long-term current use of insulin (H)  - elevated readings at night.   Plan:   Switch to injecting Lantus 7 units at night.   Use sliding scale with NovoLog throughout the day  Take more dietary protein for better blood sugar stabilization.  Continue with metformin twice daily and Trulicity once weekly as is.   Monitor blood sugar and follow up in 1-2 weeks to report.         I spent a total of 35 minutes on the day of the visit.   Time spent by me doing chart review, history and exam, documentation and further activities per the note             Jus Rodney MD  [facility]   Nancy is a 72 year old, presenting to discuss diabetes and insulin dosage.  Noting to have elevated readings at night starting with the readings of 150 by around 9 PM and continuing to go up into the night waking up with a.m. readings of 200s.   She has been taking 7 units of Lantus in the morning, and adjusting throughout the day using the sliding scale with NovoLog.   She continues on metformin 1000 mg twice daily, and Trulicity 3 mg once weekly as directed.    Of note, she was having more lows given 40 units of Lantus at night which was then switched to giving 7 units in the morning.           4/20/2023    12:00 PM   Additional Questions   Roomed by Aaron SANTIAGO CMA     HPI     Diabetes Follow-up    How often are you checking your blood sugar? Continuous glucose monitor  What time of day are you checking your blood sugars (select all that apply)?  Throught out day   Have you had any blood sugars above 200?  Yes   Have you had any blood sugars below  70?  Yes Rarely    What symptoms do you notice when your blood sugar is low?  Shaky, Weak and Other: Tangling   What concerns do you have today about your diabetes? Blood sugar is often over 200     Do you have any of these symptoms? (Select all that apply)  Excessive thirst and Weight loss      BP Readings from Last 2 Encounters:   02/13/23 (!) 154/86   12/01/22 132/74     Hemoglobin A1C (%)   Date Value   03/10/2023 10.7 (H)   12/01/2022 11.2 (H)     LDL Cholesterol Calculated (mg/dL)   Date Value   12/01/2022 79   06/16/2022 99     LDL Cholesterol Direct (mg/dl)   Date Value   08/15/2018 110   01/23/2017 95                     Review of Systems         Objective           Vitals:  No vitals were obtained today due to virtual visit.    Physical Exam   GENERAL: Healthy, alert and no distress                Video-Visit Details    Type of service:  Video Visit       Originating Location (pt. Location): Home    Distant Location (provider location):  On-site  Platform used for Video Visit: Enedelia

## 2024-08-17 ENCOUNTER — MYC REFILL (OUTPATIENT)
Dept: FAMILY MEDICINE | Facility: CLINIC | Age: 74
End: 2024-08-17
Payer: COMMERCIAL

## 2024-08-17 DIAGNOSIS — E78.49 OTHER HYPERLIPIDEMIA: ICD-10-CM

## 2024-08-19 RX ORDER — EZETIMIBE 10 MG/1
10 TABLET ORAL DAILY
Qty: 90 TABLET | Refills: 2 | Status: SHIPPED | OUTPATIENT
Start: 2024-08-19

## 2024-09-11 ENCOUNTER — PATIENT OUTREACH (OUTPATIENT)
Dept: CARE COORDINATION | Facility: CLINIC | Age: 74
End: 2024-09-11
Payer: COMMERCIAL

## 2024-09-12 ENCOUNTER — MYC MEDICAL ADVICE (OUTPATIENT)
Dept: FAMILY MEDICINE | Facility: CLINIC | Age: 74
End: 2024-09-12
Payer: COMMERCIAL

## 2024-09-12 DIAGNOSIS — E10.9 TYPE 1 DIABETES MELLITUS WITHOUT COMPLICATION (H): ICD-10-CM

## 2024-10-04 ENCOUNTER — MYC MEDICAL ADVICE (OUTPATIENT)
Dept: FAMILY MEDICINE | Facility: CLINIC | Age: 74
End: 2024-10-04
Payer: COMMERCIAL

## 2024-10-04 DIAGNOSIS — E11.40 TYPE 2 DIABETES MELLITUS WITH DIABETIC NEUROPATHY, WITH LONG-TERM CURRENT USE OF INSULIN (H): ICD-10-CM

## 2024-10-04 DIAGNOSIS — Z79.4 TYPE 2 DIABETES MELLITUS WITH DIABETIC NEUROPATHY, WITH LONG-TERM CURRENT USE OF INSULIN (H): ICD-10-CM

## 2024-10-05 ENCOUNTER — MYC MEDICAL ADVICE (OUTPATIENT)
Dept: FAMILY MEDICINE | Facility: CLINIC | Age: 74
End: 2024-10-05
Payer: COMMERCIAL

## 2024-10-15 ENCOUNTER — ANCILLARY PROCEDURE (OUTPATIENT)
Dept: MAMMOGRAPHY | Facility: CLINIC | Age: 74
End: 2024-10-15
Attending: FAMILY MEDICINE
Payer: COMMERCIAL

## 2024-10-15 DIAGNOSIS — Z12.31 VISIT FOR SCREENING MAMMOGRAM: ICD-10-CM

## 2024-10-15 PROCEDURE — 77063 BREAST TOMOSYNTHESIS BI: CPT

## 2024-10-21 ENCOUNTER — ANCILLARY PROCEDURE (OUTPATIENT)
Dept: MAMMOGRAPHY | Facility: CLINIC | Age: 74
End: 2024-10-21
Attending: FAMILY MEDICINE
Payer: COMMERCIAL

## 2024-10-21 DIAGNOSIS — R92.8 ABNORMAL MAMMOGRAM: ICD-10-CM

## 2024-10-21 PROCEDURE — 77065 DX MAMMO INCL CAD UNI: CPT | Mod: RT

## 2024-11-11 ENCOUNTER — PATIENT OUTREACH (OUTPATIENT)
Dept: GASTROENTEROLOGY | Facility: CLINIC | Age: 74
End: 2024-11-11
Payer: COMMERCIAL

## 2024-11-11 DIAGNOSIS — Z12.11 SPECIAL SCREENING FOR MALIGNANT NEOPLASMS, COLON: Primary | ICD-10-CM

## 2024-11-11 NOTE — PROGRESS NOTES
"CRC Screening Colonoscopy Referral Review    Patient meets the inclusion criteria for screening colonoscopy standing order.    Ordering/Referring Provider:  Jus Rodney      BMI: Estimated body mass index is 25.6 kg/m  as calculated from the following:    Height as of 6/10/24: 1.638 m (5' 4.5\").    Weight as of 6/10/24: 68.7 kg (151 lb 8 oz).     Sedation:  Does patient have any of the following conditions affecting sedation?  No medical conditions affecting sedation.    Previous Scopes:  Any previous recommendations or follow up needs based on previous scope?  na / No recommendations.    Medical Concerns to Postpone Order:  Does patient have any of the following medical concerns that should postpone/delay colonoscopy referral?  No medical conditions affecting colonoscopy referral.    Final Referral Details:  Based on patient's medical history patient is appropriate for referral order with moderate sedation. If patient's BMI > 50 do not schedule in ASC.  "

## 2024-12-28 ENCOUNTER — HEALTH MAINTENANCE LETTER (OUTPATIENT)
Age: 74
End: 2024-12-28

## 2025-01-02 SDOH — HEALTH STABILITY: PHYSICAL HEALTH: ON AVERAGE, HOW MANY MINUTES DO YOU ENGAGE IN EXERCISE AT THIS LEVEL?: 20 MIN

## 2025-01-02 SDOH — HEALTH STABILITY: PHYSICAL HEALTH: ON AVERAGE, HOW MANY DAYS PER WEEK DO YOU ENGAGE IN MODERATE TO STRENUOUS EXERCISE (LIKE A BRISK WALK)?: 6 DAYS

## 2025-01-02 ASSESSMENT — SOCIAL DETERMINANTS OF HEALTH (SDOH): HOW OFTEN DO YOU GET TOGETHER WITH FRIENDS OR RELATIVES?: TWICE A WEEK

## 2025-01-07 ENCOUNTER — OFFICE VISIT (OUTPATIENT)
Dept: FAMILY MEDICINE | Facility: CLINIC | Age: 75
End: 2025-01-07
Payer: COMMERCIAL

## 2025-01-07 VITALS
RESPIRATION RATE: 16 BRPM | DIASTOLIC BLOOD PRESSURE: 89 MMHG | SYSTOLIC BLOOD PRESSURE: 182 MMHG | TEMPERATURE: 97.9 F | BODY MASS INDEX: 25.33 KG/M2 | OXYGEN SATURATION: 99 % | HEART RATE: 71 BPM | WEIGHT: 152 LBS | HEIGHT: 65 IN

## 2025-01-07 DIAGNOSIS — E11.40 TYPE 2 DIABETES MELLITUS WITH DIABETIC NEUROPATHY, WITH LONG-TERM CURRENT USE OF INSULIN (H): ICD-10-CM

## 2025-01-07 DIAGNOSIS — Z79.4 TYPE 2 DIABETES MELLITUS WITH DIABETIC NEUROPATHY, WITH LONG-TERM CURRENT USE OF INSULIN (H): ICD-10-CM

## 2025-01-07 DIAGNOSIS — I10 ESSENTIAL HYPERTENSION, BENIGN: ICD-10-CM

## 2025-01-07 DIAGNOSIS — Z00.00 HEALTH CARE MAINTENANCE: Primary | ICD-10-CM

## 2025-01-07 DIAGNOSIS — E78.2 MIXED HYPERLIPIDEMIA: ICD-10-CM

## 2025-01-07 LAB
ALBUMIN SERPL BCG-MCNC: 4.4 G/DL (ref 3.5–5.2)
ALP SERPL-CCNC: 79 U/L (ref 40–150)
ALT SERPL W P-5'-P-CCNC: 21 U/L (ref 0–50)
ANION GAP SERPL CALCULATED.3IONS-SCNC: 8 MMOL/L (ref 7–15)
AST SERPL W P-5'-P-CCNC: 25 U/L (ref 0–45)
BILIRUB SERPL-MCNC: 0.4 MG/DL
BUN SERPL-MCNC: 13.5 MG/DL (ref 8–23)
CALCIUM SERPL-MCNC: 9.8 MG/DL (ref 8.8–10.4)
CHLORIDE SERPL-SCNC: 99 MMOL/L (ref 98–107)
CHOLEST SERPL-MCNC: 183 MG/DL
CREAT SERPL-MCNC: 0.59 MG/DL (ref 0.51–0.95)
CREAT UR-MCNC: 6.8 MG/DL
EGFRCR SERPLBLD CKD-EPI 2021: >90 ML/MIN/1.73M2
ERYTHROCYTE [DISTWIDTH] IN BLOOD BY AUTOMATED COUNT: 15.6 % (ref 10–15)
EST. AVERAGE GLUCOSE BLD GHB EST-MCNC: 171 MG/DL
FASTING STATUS PATIENT QL REPORTED: YES
FASTING STATUS PATIENT QL REPORTED: YES
GLUCOSE SERPL-MCNC: 141 MG/DL (ref 70–99)
HBA1C MFR BLD: 7.6 % (ref 0–5.6)
HCO3 SERPL-SCNC: 29 MMOL/L (ref 22–29)
HCT VFR BLD AUTO: 39.6 % (ref 35–47)
HDLC SERPL-MCNC: 86 MG/DL
HGB BLD-MCNC: 13 G/DL (ref 11.7–15.7)
LDLC SERPL CALC-MCNC: 86 MG/DL
MCH RBC QN AUTO: 29.3 PG (ref 26.5–33)
MCHC RBC AUTO-ENTMCNC: 32.8 G/DL (ref 31.5–36.5)
MCV RBC AUTO: 89 FL (ref 78–100)
MICROALBUMIN UR-MCNC: <12 MG/L
MICROALBUMIN/CREAT UR: NORMAL MG/G{CREAT}
NONHDLC SERPL-MCNC: 97 MG/DL
PLATELET # BLD AUTO: 278 10E3/UL (ref 150–450)
POTASSIUM SERPL-SCNC: 4.5 MMOL/L (ref 3.4–5.3)
PROT SERPL-MCNC: 6.8 G/DL (ref 6.4–8.3)
RBC # BLD AUTO: 4.44 10E6/UL (ref 3.8–5.2)
SODIUM SERPL-SCNC: 136 MMOL/L (ref 135–145)
TRIGL SERPL-MCNC: 54 MG/DL
TSH SERPL DL<=0.005 MIU/L-ACNC: 3.45 UIU/ML (ref 0.3–4.2)
WBC # BLD AUTO: 6.6 10E3/UL (ref 4–11)

## 2025-01-07 PROCEDURE — 83036 HEMOGLOBIN GLYCOSYLATED A1C: CPT | Performed by: FAMILY MEDICINE

## 2025-01-07 PROCEDURE — 82570 ASSAY OF URINE CREATININE: CPT | Performed by: FAMILY MEDICINE

## 2025-01-07 PROCEDURE — 85027 COMPLETE CBC AUTOMATED: CPT | Performed by: FAMILY MEDICINE

## 2025-01-07 PROCEDURE — 82043 UR ALBUMIN QUANTITATIVE: CPT | Performed by: FAMILY MEDICINE

## 2025-01-07 PROCEDURE — 80061 LIPID PANEL: CPT | Performed by: FAMILY MEDICINE

## 2025-01-07 PROCEDURE — 84443 ASSAY THYROID STIM HORMONE: CPT | Performed by: FAMILY MEDICINE

## 2025-01-07 PROCEDURE — G0439 PPPS, SUBSEQ VISIT: HCPCS | Performed by: FAMILY MEDICINE

## 2025-01-07 PROCEDURE — G2211 COMPLEX E/M VISIT ADD ON: HCPCS | Performed by: FAMILY MEDICINE

## 2025-01-07 PROCEDURE — 36415 COLL VENOUS BLD VENIPUNCTURE: CPT | Performed by: FAMILY MEDICINE

## 2025-01-07 PROCEDURE — 99214 OFFICE O/P EST MOD 30 MIN: CPT | Mod: 25 | Performed by: FAMILY MEDICINE

## 2025-01-07 PROCEDURE — 80053 COMPREHEN METABOLIC PANEL: CPT | Performed by: FAMILY MEDICINE

## 2025-01-07 RX ORDER — AMLODIPINE BESYLATE 5 MG/1
10 TABLET ORAL DAILY
Status: SHIPPED
Start: 2025-01-07

## 2025-01-07 NOTE — PROGRESS NOTES
"Preventive Care Visit  [facility]  Jorge Luis Gresham MD, Family Medicine  Jan 7, 2025      Assessment & Plan     Health care maintenance  Patient here for annual exam she is interested in fasting blood work  Obtain blood work today and follow-up based on results  Recent update on mammogram  Contemplating lumbar colonoscopy has a brother who had colon cancer  - Hemoglobin A1c  - Comprehensive metabolic panel (BMP + Alb, Alk Phos, ALT, AST, Total. Bili, TP)  - CBC with platelets  - Lipid Profile (Chol, Trig, HDL, LDL calc)  - Albumin Random Urine Quantitative with Creat Ratio  - Hemoglobin A1c  - Comprehensive metabolic panel (BMP + Alb, Alk Phos, ALT, AST, Total. Bili, TP)  - CBC with platelets  - Lipid Profile (Chol, Trig, HDL, LDL calc)  - Albumin Random Urine Quantitative with Creat Ratio    Essential hypertension, benign  Blood pressure elevated recently decreased on amlodipine secondary to muscle cramping  Discussed with her likely an electrolyte or water issue will trial back up on amlodipine to 10 mg and update me with blood pressure readings and muscle cramping in a couple weeks if cramping returns I will change to alternative medication  - amLODIPine (NORVASC) 5 MG tablet    Type 2 diabetes mellitus with diabetic neuropathy, with long-term current use of insulin (H)  A1c returns improved from prior we will continue with current dosing on Lantus NovoLog and Trulicity  Recommend follow-up at 6-month intervals    Mixed hyperlipidemia  Patient on Zetia for cholesterol control she had had problems with statins in the past  Continue Zetia check lipid levels      Patient has been advised of split billing requirements and indicates understanding: Yes        BMI  Estimated body mass index is 25.51 kg/m  as calculated from the following:    Height as of this encounter: 1.644 m (5' 4.72\").    Weight as of this encounter: 68.9 kg (152 lb).   Weight management plan: Discussed healthy diet " and exercise guidelines    Counseling  Appropriate preventive services were addressed with this patient via screening, questionnaire, or discussion as appropriate for fall prevention, nutrition, physical activity, Tobacco-use cessation, social engagement, weight loss and cognition.  Checklist reviewing preventive services available has been given to the patient.  Reviewed patient's diet, addressing concerns and/or questions.   The patient was instructed to see the dentist every 6 months.   The patient was provided with written information regarding signs of hearing loss.   Information on urinary incontinence and treatment options given to patient.           Cathleen Cruz is a 74 year old, presenting for the following:  Wellness Visit (Fasting labs )        1/7/2025     9:07 AM   Additional Questions   Roomed by Shirley GERARDO CMA   Accompanied by Spouse           HPI  Patient here for annual exam  Interested in fasting blood work she is looking to transition care to clinic here with her   Has been a diabetic for about 15 years A1c has been trending down slowly she returns at goal range below 8% for the first time in some time  Currently using Trulicity Lantus and NovoLog-will have her continue with current dosing discussed avoiding nighttime correction to avoid hypoglycemia in the melanite she is using a freestyle franny 2 with alarm set.  Patient's blood pressure is elevated recently decreased amlodipine secondary to some muscle cramping discussed likely due to other reasons encouraged her to go back on the 10 mg of amlodipine and recheck blood pressure.  If muscle cramping returns we can try an alternative medication.  She is on Zetia had problems with statin medications continue Zetia at this time and check lipid levels today along with other blood work.    Data mammogram up-to-date on immunizations  Patient is due for colonoscopy is considering another colonoscopy in the setting that her brother had colon  cancer.        Health Care Directive  Patient does not have a Health Care Directive: Patient states has Advance Directive and will bring in a copy to clinic.      1/2/2025   General Health   How would you rate your overall physical health? Good   Feel stress (tense, anxious, or unable to sleep) Only a little   (!) STRESS CONCERN      1/2/2025   Nutrition   Diet: Diabetic         1/2/2025   Exercise   Days per week of moderate/strenous exercise 6 days   Average minutes spent exercising at this level 20 min         1/2/2025   Social Factors   Frequency of gathering with friends or relatives Twice a week   Worry food won't last until get money to buy more No   Food not last or not have enough money for food? No   Do you have housing? (Housing is defined as stable permanent housing and does not include staying ouside in a car, in a tent, in an abandoned building, in an overnight shelter, or couch-surfing.) Yes   Are you worried about losing your housing? No   Lack of transportation? No   Unable to get utilities (heat,electricity)? No         1/2/2025   Fall Risk   Fallen 2 or more times in the past year? No    No   Trouble with walking or balance? No    No       Multiple values from one day are sorted in reverse-chronological order          1/2/2025   Activities of Daily Living- Home Safety   Needs help with the following daily activites None of the above   Safety concerns in the home None of the above         1/2/2025   Dental   Dentist two times every year? (!) NO         1/2/2025   Hearing Screening   Hearing concerns? (!) TROUBLE UNDESTANDING A SPEAKER IN A PUBLIC MEETING OR [employer] SERVICE.         1/2/2025   Driving Risk Screening   Patient/family members have concerns about driving No         1/2/2025   General Alertness/Fatigue Screening   Have you been more tired than usual lately? No         1/2/2025   Urinary Incontinence Screening   Bothered by leaking urine in past 6 months Yes         1/2/2025   TB  Screening   Were you born outside of the [place]? No         Today's PHQ-2 Score:       2025     8:44 AM   PHQ-2 (  [employer])   Q1: Little interest or pleasure in doing things 0   Q2: Feeling down, depressed or hopeless 0   PHQ-2 Score 0    Q1: Little interest or pleasure in doing things Not at all   Q2: Feeling down, depressed or hopeless Not at all   PHQ-2 Score 0       Patient-reported           2025   Substance Use   Alcohol more than 3/day or more than 7/wk No   Do you have a current opioid prescription? No   How severe/bad is pain from 1 to 10? 0/10 (No Pain)   Do you use any other substances recreationally? No     Social History     Tobacco Use    Smoking status: Former     Current packs/day: 0.00     Average packs/day: 1.5 packs/day for 13.0 years (19.6 ttl pk-yrs)     Types: Cigarettes     Start date: 9/10/1967     Quit date: 1/15/1979     Years since quittin.0     Passive exposure: Past    Smokeless tobacco: Never   Vaping Use    Vaping status: Never Used   Substance Use Topics    Alcohol use: Not Currently    Drug use: Never           10/15/2024   LAST FHS-7 RESULTS   1st degree relative breast or ovarian cancer Yes        Patient is pursuing yearly mammogram    ASCVD Risk   The 10-year ASCVD risk score (Vielka URBINA, et al., 2019) is: 60.4%    Values used to calculate the score:      Age: 74 years      Sex: Female      Is Non- : No      Diabetic: Yes      Tobacco smoker: No      Systolic Blood Pressure: 182 mmHg      Is BP treated: Yes      HDL Cholesterol: 88 mg/dL      Total Cholesterol: 172 mg/dL            Reviewed and updated as needed this visit by Provider                      Current providers sharing in care for this patient include:  Patient Care Team:  Jorge Luis Gresham MD as PCP - General (Family Medicine)  Jus Rodney MD as Assigned PCP  Lauren Obrien RN as Registered Nurse (Diabetes Education)  Toan Ramírez, KimberlyD as Pharmacist  "(Pharmacist)  Family Medicine, Physician, MD as MD (Family Practice)  Kelly Vega RPH as Pharmacist  Kelly Vega RPH as Pharmacist (Pharmacist)    The following health maintenance items are reviewed in Epic and correct as of today:  Health Maintenance   Topic Date Due    MICROALBUMIN  06/19/2024    DIABETIC FOOT EXAM  06/22/2024    COLORECTAL CANCER SCREENING  10/11/2024    A1C  12/06/2024    BMP  01/05/2025    LIPID  01/05/2025    MEDICARE ANNUAL WELLNESS VISIT  01/05/2025    ANNUAL REVIEW OF HM ORDERS  06/10/2025    EYE EXAM  03/15/2025    FALL RISK ASSESSMENT  01/07/2026    DEXA  01/18/2026    ADVANCE CARE PLANNING  01/05/2029    DTAP/TDAP/TD IMMUNIZATION (5 - Td or Tdap) 06/13/2034    HEPATITIS C SCREENING  Completed    PHQ-2 (once per calendar year)  Completed    INFLUENZA VACCINE  Completed    Pneumococcal Vaccine: 50+ Years  Completed    ZOSTER IMMUNIZATION  Completed    RSV VACCINE  Completed    COVID-19 Vaccine  Completed    HPV IMMUNIZATION  Aged Out    MENINGITIS IMMUNIZATION  Aged Out    RSV MONOCLONAL ANTIBODY  Aged Out    MAMMO SCREENING  Discontinued            Objective    Exam  BP (!) 182/89 (BP Location: Left arm, Patient Position: Sitting, Cuff Size: Adult Regular)   Pulse 71   Temp 97.9  F (36.6  C) (Oral)   Resp 16   Ht 1.644 m (5' 4.72\")   Wt 68.9 kg (152 lb)   LMP  (LMP Unknown)   SpO2 99%   BMI 25.51 kg/m     Estimated body mass index is 25.51 kg/m  as calculated from the following:    Height as of this encounter: 1.644 m (5' 4.72\").    Weight as of this encounter: 68.9 kg (152 lb).    Physical Exam  GENERAL: alert and no distress  EYES: Eyes grossly normal to inspection, PERRL and conjunctivae and sclerae normal  HENT: ear canals and TM's normal, nose and mouth without ulcers or lesions  RESP: lungs clear to auscultation - no rales, rhonchi or wheezes  CV: regular rate and rhythm, normal S1 S2, no S3 or S4, no murmur, click or rub, no peripheral edema  ABDOMEN: " bowel sounds normal  MS: no gross musculoskeletal defects noted, no edema  PSYCH: mentation appears normal, affect normal/bright         1/7/2025   Mini Cog   Clock Draw Score 2 Normal   3 Item Recall 3 objects recalled   Mini Cog Total Score 5              Signed Electronically by: Jorge Luis Gresham MD

## 2025-01-07 NOTE — PATIENT INSTRUCTIONS
Increase amlodipine to 10mg  Update dr. Gresham in 10 days with blood pressure and if leg cramping occurred

## 2025-01-13 DIAGNOSIS — Z79.4 TYPE 2 DIABETES MELLITUS WITH DIABETIC NEUROPATHY, WITH LONG-TERM CURRENT USE OF INSULIN (H): ICD-10-CM

## 2025-01-13 DIAGNOSIS — E11.40 TYPE 2 DIABETES MELLITUS WITH DIABETIC NEUROPATHY, WITH LONG-TERM CURRENT USE OF INSULIN (H): ICD-10-CM

## 2025-01-13 RX ORDER — DULAGLUTIDE 3 MG/.5ML
3 INJECTION, SOLUTION SUBCUTANEOUS WEEKLY
Qty: 6 ML | Refills: 2 | Status: SHIPPED | OUTPATIENT
Start: 2025-01-13

## 2025-02-15 ENCOUNTER — TRANSFERRED RECORDS (OUTPATIENT)
Dept: MULTI SPECIALTY CLINIC | Facility: CLINIC | Age: 75
End: 2025-02-15

## 2025-02-15 LAB — RETINOPATHY: NORMAL

## 2025-02-17 ENCOUNTER — MYC MEDICAL ADVICE (OUTPATIENT)
Dept: FAMILY MEDICINE | Facility: CLINIC | Age: 75
End: 2025-02-17

## 2025-02-17 DIAGNOSIS — M85.89 OSTEOPENIA OF MULTIPLE SITES: ICD-10-CM

## 2025-02-17 RX ORDER — ALENDRONATE SODIUM 70 MG/1
70 TABLET ORAL
Qty: 12 TABLET | Refills: 2 | Status: SHIPPED | OUTPATIENT
Start: 2025-02-17

## 2025-03-08 ENCOUNTER — MYC MEDICAL ADVICE (OUTPATIENT)
Dept: FAMILY MEDICINE | Facility: CLINIC | Age: 75
End: 2025-03-08
Payer: COMMERCIAL

## 2025-03-08 DIAGNOSIS — E11.40 TYPE 2 DIABETES MELLITUS WITH DIABETIC NEUROPATHY, WITH LONG-TERM CURRENT USE OF INSULIN (H): ICD-10-CM

## 2025-03-08 DIAGNOSIS — Z79.4 TYPE 2 DIABETES MELLITUS WITH DIABETIC NEUROPATHY, WITH LONG-TERM CURRENT USE OF INSULIN (H): ICD-10-CM

## 2025-03-08 DIAGNOSIS — I10 ESSENTIAL HYPERTENSION, BENIGN: ICD-10-CM

## 2025-03-10 RX ORDER — GABAPENTIN 300 MG/1
300 CAPSULE ORAL AT BEDTIME
Qty: 90 CAPSULE | Refills: 3 | Status: SHIPPED | OUTPATIENT
Start: 2025-03-10

## 2025-03-10 RX ORDER — METOPROLOL SUCCINATE 100 MG/1
100 TABLET, EXTENDED RELEASE ORAL DAILY
Qty: 90 TABLET | Refills: 3 | Status: SHIPPED | OUTPATIENT
Start: 2025-03-10

## 2025-03-11 ENCOUNTER — TELEPHONE (OUTPATIENT)
Dept: FAMILY MEDICINE | Facility: CLINIC | Age: 75
End: 2025-03-11
Payer: COMMERCIAL

## 2025-03-11 NOTE — TELEPHONE ENCOUNTER
Prior Authorization Retail Medication Request    Medication/Dose: freestyle franny 2 plus sensor  Diagnosis and ICD code (if different than what is on RX):   New/renewal/insurance change PA/secondary ins. PA:  Previously Tried and Failed:    Rationale:  Patient needs continuous glucose monitor sensor since she is taking insulin for her diabetes management.  Freestyle franny 2 will no longer be available so she needs change to freestyle franny 2 plus    Insurance   Primary:   Insurance ID:      Secondary (if applicable):  Insurance ID:      Pharmacy Information (if different than what is on RX)  Name:    Phone:   Fax:    Clinic Information  Preferred routing pool for dept communication:

## 2025-03-13 NOTE — TELEPHONE ENCOUNTER
Retail Pharmacy Prior Authorization Team   Phone: [phone]    Prior Authorization Not Needed per Insurance    Medication: FREESTYLE LARY 2 PLUS SENSOR MISC  Insurance Company: [employer] - Phone [phone] Fax [phone]  Expected CoPay: $    Pharmacy Filling the Rx: [id number] [place] [facility] [address] [facility]  Pharmacy Notified: YES  Patient Notified: YES    Approval on file is good for all Freestyle Lary products

## 2025-04-01 ENCOUNTER — VIRTUAL VISIT (OUTPATIENT)
Dept: PHARMACY | Facility: CLINIC | Age: 75
End: 2025-04-01
Payer: COMMERCIAL

## 2025-04-01 DIAGNOSIS — E11.40 TYPE 2 DIABETES MELLITUS WITH DIABETIC NEUROPATHY, WITH LONG-TERM CURRENT USE OF INSULIN (H): ICD-10-CM

## 2025-04-01 DIAGNOSIS — K59.00 CONSTIPATION, UNSPECIFIED CONSTIPATION TYPE: ICD-10-CM

## 2025-04-01 DIAGNOSIS — M85.80 OSTEOPENIA WITH HIGH RISK OF FRACTURE: Primary | ICD-10-CM

## 2025-04-01 DIAGNOSIS — Z79.4 TYPE 2 DIABETES MELLITUS WITH DIABETIC NEUROPATHY, WITH LONG-TERM CURRENT USE OF INSULIN (H): ICD-10-CM

## 2025-04-01 PROCEDURE — 99606 MTMS BY PHARM EST 15 MIN: CPT | Mod: 93 | Performed by: PHARMACIST

## 2025-04-01 PROCEDURE — 99607 MTMS BY PHARM ADDL 15 MIN: CPT | Mod: 93 | Performed by: PHARMACIST

## 2025-04-01 RX ORDER — INSULIN GLARGINE 100 [IU]/ML
12 INJECTION, SOLUTION SUBCUTANEOUS AT BEDTIME
Qty: 15 ML | Refills: 0 | Status: SHIPPED | OUTPATIENT
Start: 2025-04-01

## 2025-04-01 NOTE — PROGRESS NOTES
Medication Therapy Management (MTM) Encounter    ASSESSMENT:                            Medication Adherence/Access: See below for considerations.    Diabetes:  Patient's A1c of 7.6% is not at ADA goal of <7%.   SMBG fasting sugars are sometimes, but not always at ADA goal of  mg/dL and post-prandial sugars are not at goal of less than 180 mg/dL.  Patient would benefit from:    -metformin: plan in place to change to XR formulation given her history of GI upset, and to potentially decrease GI related side effects with GLP-1 changes  -long acting insulin: continue current therapy since will be changing GLP-1/GIP in the near future (will update prescription at pharmacy to reflect current dose). Then may adjust long acting insulin if needed after therapy change.  -short acting insulin: previously discussed potential to taper off short acting insulin with GLP-1 agent change, but today we discussed uncertain if will be able to stop short acting insulin given her low c-peptide.  At this time, recommend increasing AM meal short acting insulin since she consistently has high post-prandial glucose after AM meals. May adjust other mealtime insulin in the future, but will first change the GLP-1/GIP agent and then follow-up.  -GLP-1/GIP: discussed today uncertain if the Mounjaro will have a higher efficacy for her given her C-peptide result (no endogenous insulin production), however hopeful that this will be better tolerated (GI side effects). Additionally discussed that although the medication may not effectively increase pancreatic insulin production through GLP-1/GIP receptor activation, the medication will still likely have appetite suppressing benefits (on GLP-1 receptors in brain).    HM: address at follow-up visit.    Osteopenia with High FRAX score:   Reviewed duration of bisphosphonate use and recommend drug holiday - Patient has been taking ~5.5. years. Reviewed previous DEXA scans that demonstrate increase in  Tscores for lumbar spine and relatively stable Tscores for L&R femur scores.  Future: recommend vitamin D lab - previously ordered    Constipation: softer and more frequent BMs. However given the leakage after BM, consider decreasing frequency of miralax. Patient willing to try.    PLAN:                            1.  When you finish the current metformin 1000 mg tablets, changed to metformin  mg tablets: Take 2 tablets by mouth twice daily    2.  Plan to change from Trulicity to mounjaro when you finish the trulicity supply (after 3 more weeks).    3. Increase the morning short acting insulin (mealtime insulin) since your morning after meal blood sugars have been elevated.  AM: take 6 units of mealtime insulin + sliding scale (if needed).    If you have repeat low blood sugars, please call clinic so that we can make a medication adjustment before your follow-up visit.    4. Okay to decrease miralax to every other day for use if the stools are too loose.    5.  Recommend discussing the option of taking an alendronate drug holiday with Dr. Gresham -this would mean to discontinue the alendronate and then recheck your DEXA scan in 1 to 2 years.    Follow-up: 5/15 at 2:30p by telephone.    SUBJECTIVE/OBJECTIVE:                          Nancy Hughes is a 74 year old female seen for a follow-up visit.       Reason for visit: diabetes and BM follow-up, Patient has questions about osteoporosis medications.    Allergies/ADRs: Reviewed in chart  Past Medical History: Reviewed in chart  Tobacco: She reports that she quit smoking about 46 years ago. Her smoking use included cigarettes. She started smoking about 57 years ago. She has a 19.6 pack-year smoking history. She has been exposed to tobacco smoke. She has never used smokeless tobacco.  Alcohol: Less than 1 beverage / month  Caffeine: 1 cup of coffee day (1/2 regular and half decaf), 1/2 can caffeinated pop per day.     Medication Adherence/Access: Patient  self-manages medications, no trouble affording meds at this time.  Takes certain meds in the morning and certain meds at supper time and then gabapentin at bedtime.    Diabetes   Patient reports that she had a c-peptide test in the past and that showed she had low insulin production and that is when she started on the mealtime insulin (in addition to the other meds)-  States that this greatly improved her  diabetescontrol.    CDCES: not currently following    Dulaglutide (Trulicity) subcut injection 3 mg: once weekly on Friday, discussed potentially going up to 4.5 previously with one of her providers but ended up staying with 3 mg - a 3 month supply is $450-600.  Current supply: Has 3 weeks remaining. Then planning to change to Mounjaro 7.5 mg: did receive this in the mail, has not yet started because she's finishing trulicity.  Lantus (glargine) long acting insulin: 12 units daily in the morning upon awakening  Novolog (aspart) short acting insulin: three times daily with meals. Usually dose is 8-17 units per meal. Feels that the short acting insulin takes a long time ~1 hour or more to be effective.    AM: take 4 units + sliding scale + 4 units  Noon meal: take 2 or 3 units + sliding scale (unless eating out then adds more)  Evening meal: taking 2 or 3 units + sliding scale (unless eating out then adds more)    Sliding scale outline.  150-200 2 units  201-250 4 units  251-300 6 units  301-350 8 units  351-400 10 units  401 + 12 units, recheck in 2 hours and if still high contact provider    Metformin 1000 m tab twice daily, finishing this supply and then planning to change to Metformin  m tabs twice daily with meals - has not yet started this.    Aspirin 81 mg: once daily - no concerns about bruising/bleeding, no history of MI or CVA per Patient report. Fall history: none, does feel stable, no walker or cane.  Glucagon nasal powder Patient has to use as needed.  Has gummy worms/fruit snacks, orange  juice. Prefers these over the glucose tabs.    Side effect?   For trulicity - initally when started this medication she had nasuea.  See constipation details -below.    Blood sugar monitoring:  Freestyle franny   Using freestyle franny alarm for lows (set at 80 mg/dL).      Patient recalls a low carb meal : soup, salad, small serving of bread. Thinks this might be why had low blood sugar 3/23-3/24.      Sun/Monday AM: Patient recalls having thin crust pizza and salad, maybe dessert. Notes that sometimes she eats a lot more on Sunday evening because she does not want to have a low blood sugar overnight or on Monday morning.  She wants to be fasting on Monday morning so that she can take her alendronate on an empty stomach.    Diabetes symptoms: Neuropathy- taking gabapentin    Diet/Exercise: Not discussed in detail today due to time limitations/other focus of visit     Urine albumin is   Lab Results   Component Value Date    UMALCR  01/07/2025      Comment:      Unable to calculate, urine albumin and/or urine creatinine is outside detectable limits.  Microalbuminuria is defined as an albumin:creatinine ratio of 17 to 299 for males and 25 to 299 for females. A ratio of albumin:creatinine of 300 or higher is indicative of overt proteinuria.  Due to biologic variability, positive results should be confirmed by a second, first-morning random or 24-hour timed urine specimen. If there is discrepancy, a third specimen is recommended. When 2 out of 3 results are in the microalbuminuria range, this is evidence for incipient nephropathy and warrants increased efforts at glucose control, blood pressure control, and institution of therapy with an angiotensin-converting-enzyme (ACE) inhibitor (if the patient can tolerate it).       Lab Results   Component Value Date    A1C 7.6 01/07/2025    A1C 8.1 06/06/2024    A1C 7.9 12/18/2023    A1C 8.3 06/19/2023    A1C 10.7 03/10/2023       Estimated body mass index is 25.51 kg/m  as  "calculated from the following:    Height as of 25: 5' 4.72\" (1.644 m).    Weight as of 25: 152 lb (68.9 kg).     Eye exam in the last 12 months? Not discussed today due to other focus of the visit.  Foot exam: Not discussed today due to other focus of the visit.    Bone Health   /osteopenia with high FRAX score:  Alendronate 70 m tab once weekly on  - takes first thing in the morning and waits 30 min before eating anything else or drinking anything else.  History/duration of use: per 19 visit note Patient recently started on fosamax by an osteoporosis specialist.  Patient does think that she's been taking alendronate consistently since that time.    Calcium citrate 650 mg + D3 25 mcg (per 2 tabs): 1 tab AM and 2 tabs PM  Dietary calcium: cheese at lunch daily, estimates 1 serving of calcium per day.    Patient is not experiencing side effects.  DEXA History:     2024   -  DXA RESULTS  -Lumbar Spine: L1-L4: BMD: 1.003 g/cm2. T-score: -1.5. Z-score: 0.3. Degenerative change may artifactually increase BMD.  -RIGHT Hip Total: BMD: 0.859 g/cm2. T-score: -1.2. Z-score: 0.5.  -RIGHT Hip Femoral neck: BMD: 0.764 g/cm2. T-score: -2.0. Z-score: -0.1.  -LEFT Hip Total: BMD: 0.831 g/cm2. T-score: -1.4. Z-score: 0.3.  -LEFT Hip Femoral neck: BMD: 0.725 g/cm2. T-score: -2.3. Z-score: -0.4.    2021  -UMBAR SPINE L1-L4:  BMD: 1.024 g/cm sq  T Score: -1.3  Z Score: 0.4  Degenerative changes are present in the lumbar spine which artificially elevate the bone mineral density. Osteopenia. Bone mineral density in the lumbar spine has increased 9.1% since the most recent exam.     TOTAL LEFT PROXIMAL FEMUR:  BMD: 0.843 g/cm sq  T Score: -1.3  Z Score: 0.3  Osteopenia. Bone mineral density in the total left proximal femur has not significantly changed since the most recent exam.     LEFT FEMORAL NECK:  BMD: 0.727 g/cm sq  T Score: -2.2  Z Score: -0.4  Osteopenia.     TOTAL RIGHT PROXIMAL FEMUR:  BMD: 0.871 " "g/cm sq  T Score: -1.1  Z Score: 0.5  Osteopenia. Bone mineral density in the total right proximal femur has not significantly changed  since the most recent exam.     RIGHT FEMORAL NECK:  BMD: 0.762 g/cm sq  T Score: -2.0  Z Score: -0.2  Osteopenia.    7/20219  L1-L4: -2.0  Right femur neck -2.0  Right femur total -1.2  Left femur neck -2.1  Left femur total -1.4    Risk factors: post-menopausal  Vitamin D Deficiency Screening Results:  No results found for: \"VITDT\"       Constipation   4/1: Smoother BM that is easier to pass.  Usually has a BM every 3 days, this is more frequent than previous. (States that all her life didn't have a BM every day).  No diarrhea.    Docusate sodium 100 mg: taking 1 capsule daily  Miralax (PEG powder): using ~1/3 capful of powder mixed in beverage of choice daily. Has found this effective, sometimes has leakage after the BM.  Medication History: was using Senakot historically, miralax (unsure if effective)       Today's Vitals: LMP  (LMP Unknown)   ----------------    I spent 52 minutes with this patient today. All changes were made via collaborative practice agreement with Jorge Luis Gresham MD.     A summary of these recommendations was sent via [employer].    Kimberly MerrittD  Medication Therapy Management (MTM) Pharmacist    Telemedicine Visit Details  The patient's medications can be safely assessed via a telemedicine encounter.  Type of service:  Telephone visit  Originating Location (pt. Location): Home    Distant Location (provider location):  On-site  Start Time:  12:10 pm  End Time:  1:02 pm     Medication Therapy Recommendations  Constipation, unspecified constipation type   1 Current Medication: polyethylene glycol (MIRALAX) 17 GM/Dose powder   Current Medication Sig: Take 0.5 Capfuls by mouth daily as needed for constipation. . Okay to take every other day.   Rationale: Dose too high - Dosage too high - Safety   Recommendation: Decrease Frequency   Status: Patient " Agreed - Adherence/Education   Identified Date: 4/1/2025 Completed Date: 4/1/2025   Note: OTC medication recommendation         Osteopenia with high risk of fracture   1 Current Medication: alendronate (FOSAMAX) 70 MG tablet   Current Medication Sig: Take 1 tablet (70 mg) by mouth every 7 days.   Rationale: Order duration inappropriate - Dosage too high - Safety   Recommendation: Discontinue Medication   Status: Contact Provider - Awaiting Response   Identified Date: 4/1/2025   Note: drug holiday         Type 2 diabetes mellitus with diabetic neuropathy, with long-term current use of insulin (H)   1 Current Medication: insulin aspart (NOVOLOG FLEXPEN RELION) 100 UNIT/ML pen   Current Medication Sig: Inject 10-17 Units Subcutaneous 3 times daily (with meals)   Rationale: Dose too low - Dosage too low - Effectiveness   Recommendation: Increase Dose   Status: Accepted per CPA   Identified Date: 4/1/2025 Completed Date: 4/1/2025

## 2025-04-01 NOTE — Clinical Note
MTM update  -see note for diabetes plan -we discussed osteoporosis bisphosphonate drug holiday - she plans to discuss with you at upcoming appointment. -adjusting miralax (working to manage constipation from GLP-1 use)  See note for details and let me know if any questions. KB

## 2025-04-01 NOTE — PATIENT INSTRUCTIONS
"Recommendations from today's MTM visit:                                                    MTM (medication therapy management) is a service provided by a clinical pharmacist designed to help you get the most of out of your medicines.      1.  When you finish the current metformin 1000 mg tablets, change to metformin  mg tablets: Take 2 tablets by mouth twice daily    2.  Plan to change from Trulicity to mounjaro when you finish the trulicity supply (after 3 more weeks).    3. Increase the morning short acting insulin (mealtime insulin) since your morning after meal blood sugars have been elevated.  AM: take 6 units of mealtime insulin + sliding scale (if needed).    If you have repeat low blood sugars, please call clinic so that we can make a medication adjustment before your follow-up visit.    4. Okay to decrease miralax to every other day for use if the stools are too loose.    5.  Recommend discussing the option of taking an alendronate drug holiday with Dr. Gresham -this would mean to discontinue the alendronate and then recheck your DEXA scan in 1 to 2 years.    Follow-up: 5/15 at 2:30p by telephone.    It was great speaking with you today.  I value your experience and would be very thankful for your time in providing feedback in our clinic survey. In the next few days, you may receive an email or text message from [employer] with a link to a survey related to your  clinical pharmacist.\"     To schedule another MTM appointment, please call the clinic directly or you may call the MTM scheduling line at [phone].    My Clinical Pharmacist's contact information:                                                      Please feel free to contact me with any questions or concerns you have.      Lucia Lombardi, PharmD  Medication Therapy Management (MTM) Pharmacist   "

## 2025-04-07 ENCOUNTER — OFFICE VISIT (OUTPATIENT)
Dept: FAMILY MEDICINE | Facility: CLINIC | Age: 75
End: 2025-04-07
Payer: COMMERCIAL

## 2025-04-07 ENCOUNTER — MYC MEDICAL ADVICE (OUTPATIENT)
Dept: FAMILY MEDICINE | Facility: CLINIC | Age: 75
End: 2025-04-07

## 2025-04-07 VITALS
BODY MASS INDEX: 26.59 KG/M2 | HEART RATE: 76 BPM | OXYGEN SATURATION: 99 % | SYSTOLIC BLOOD PRESSURE: 152 MMHG | RESPIRATION RATE: 20 BRPM | TEMPERATURE: 97.9 F | DIASTOLIC BLOOD PRESSURE: 79 MMHG | HEIGHT: 65 IN | WEIGHT: 159.6 LBS

## 2025-04-07 DIAGNOSIS — I10 ESSENTIAL HYPERTENSION: ICD-10-CM

## 2025-04-07 DIAGNOSIS — Z79.4 TYPE 2 DIABETES MELLITUS WITH DIABETIC NEUROPATHY, WITH LONG-TERM CURRENT USE OF INSULIN (H): Primary | ICD-10-CM

## 2025-04-07 DIAGNOSIS — E13.42 DIABETIC POLYNEUROPATHY ASSOCIATED WITH OTHER SPECIFIED DIABETES MELLITUS (H): ICD-10-CM

## 2025-04-07 DIAGNOSIS — E11.40 TYPE 2 DIABETES MELLITUS WITH DIABETIC NEUROPATHY, WITH LONG-TERM CURRENT USE OF INSULIN (H): Primary | ICD-10-CM

## 2025-04-07 DIAGNOSIS — M85.89 OSTEOPENIA OF MULTIPLE SITES: ICD-10-CM

## 2025-04-07 DIAGNOSIS — E83.42 HYPOMAGNESEMIA: ICD-10-CM

## 2025-04-07 LAB
EST. AVERAGE GLUCOSE BLD GHB EST-MCNC: 183 MG/DL
HBA1C MFR BLD: 8 % (ref 0–5.6)
HOLD SPECIMEN: NORMAL

## 2025-04-07 PROCEDURE — 83036 HEMOGLOBIN GLYCOSYLATED A1C: CPT | Performed by: FAMILY MEDICINE

## 2025-04-07 PROCEDURE — 36415 COLL VENOUS BLD VENIPUNCTURE: CPT | Performed by: FAMILY MEDICINE

## 2025-04-07 RX ORDER — METOPROLOL SUCCINATE 25 MG/1
25 TABLET, EXTENDED RELEASE ORAL DAILY
Qty: 100 TABLET | Refills: 1 | Status: SHIPPED | OUTPATIENT
Start: 2025-04-07

## 2025-04-07 NOTE — PROGRESS NOTES
Assessment & Plan     Type 2 diabetes mellitus with diabetic neuropathy, with long-term current use of insulin (H)  A1c returns at 8%  Patient is transitioning to Mounjaro from Trulicity and to extended release metformin  Discussed working on these medication changes and update me in a few weeks when she starts them  - Hemoglobin A1c  - Extra Green Top Tube (LAB USE ONLY)  - Hemoglobin A1c  - Vitamin B12    Essential hypertension  Blood pressure elevated has been running elevated at home will increase metoprolol by another 25 mg for a total of 125 mg daily  - metoprolol succinate ER (TOPROL XL) 25 MG 24 hr tablet  Dispense: 100 tablet; Refill: 1    Hypomagnesemia  History of low magnesium levels check levels today  - Magnesium    Osteopenia of multiple sites  History of osteopenia has been on alendronate for more than 6 years without a vacation  Discussed trialing off medication and repeating DEXA scan next year  - Vitamin D Deficiency    Diabetic polyneuropathy associated with other specified diabetes mellitus (H)  History of neuropathy and diabetes check B12 levels  - Vitamin B12          Blood sugar testing frequency justification:  Adjustment of medication(s)        Cathleen Cruz is a 74 year old, presenting for the following health issues:  Diabetes and Blood Pressure Check  Patient for diabetic check and blood pressure check  Blood pressure has been running elevated at home-we discussed different options today and proceeded to add a 25 mg tablet to her metoprolol that she is already taking.  Patient has been meeting with pharmacy and she is transitioning from immediate release extended release metformin as well as Trulicity to Mounjaro.  Discussed with patient to update me in a couple weeks after starting the medications let me know how glucose levels are running.    Patient wondering about taking a vacation from alendronate has been on it for 6 years there has been some improvement and we discussed with  "her vacation off the medication for a year.  Continue with vitamin D and calcium supplement and weightbearing exercise.  Patient history of low magnesium and neuropathy we will check B12 levels and magnesium levels today.      4/7/2025    11:34 AM   Additional Questions   Roomed by Kathrine FANG LPN     Via the Health Maintenance questionnaire, the patient has reported the following services have been completed -Eye Exam: [facility] 2025-02-15, this information has been sent to the abstraction team.  History of Present Illness       Diabetes:   She presents for follow up of diabetes.  She is checking home blood glucose four or more times daily.   She checks blood glucose before meals.  Blood glucose is sometimes over 200 and sometimes under 70. She is aware of hypoglycemia symptoms including shakiness, weakness and other.   She is concerned about blood sugar frequently over 200 and low blood sugar, several less than 70 in the past few weeks.   She is having excessive thirst.  The patient has had a diabetic eye exam in the last 12 months. Eye exam performed on 2/2025. Location of last eye exam [facility].        Hypertension: She presents for follow up of hypertension.  She does not check blood pressure  regularly outside of the clinic. Outside blood pressures have been over 140/90. She follows a low salt diet.     She eats 2-3 servings of fruits and vegetables daily.She consumes 0 sweetened beverage(s) daily.She exercises with enough effort to increase her heart rate 20 to 29 minutes per day.  She exercises with enough effort to increase her heart rate 6 days per week.   She is taking medications regularly.                      Objective    BP (!) 152/79   Pulse 76   Temp 97.9  F (36.6  C) (Oral)   Resp 20   Ht 1.644 m (5' 4.72\")   Wt 72.4 kg (159 lb 9.6 oz)   LMP  (LMP Unknown)   SpO2 99%   BMI 26.79 kg/m    Body mass index is 26.79 kg/m .  Physical Exam   GENERAL: alert and no distress  EYES: Eyes " grossly normal to inspection, PERRL and conjunctivae and sclerae normal  RESP: lungs clear to auscultation - no rales, rhonchi or wheezes  CV: regular rate and rhythm, normal S1 S2, no S3 or S4, no murmur, click or rub, no peripheral edema  MS: no gross musculoskeletal defects noted, no edema  PSYCH: mentation appears normal, affect normal/bright  Foot exam: Within normal limits        The longitudinal plan of care for the diagnosis(es)/condition(s) as documented were addressed during this visit. Due to the added complexity in care, I will continue to support Nancy in the subsequent management and with ongoing continuity of care.  Signed Electronically by: Jorge Luis Gresham MD

## 2025-04-08 LAB
MAGNESIUM SERPL-MCNC: 2.1 MG/DL (ref 1.7–2.3)
VIT B12 SERPL-MCNC: 813 PG/ML (ref 232–1245)
VIT D+METAB SERPL-MCNC: 67 NG/ML (ref 20–50)

## 2025-04-12 ENCOUNTER — MYC MEDICAL ADVICE (OUTPATIENT)
Dept: FAMILY MEDICINE | Facility: CLINIC | Age: 75
End: 2025-04-12
Payer: COMMERCIAL

## 2025-04-12 DIAGNOSIS — I10 ESSENTIAL HYPERTENSION: ICD-10-CM

## 2025-04-14 RX ORDER — METOPROLOL SUCCINATE 25 MG/1
25 TABLET, EXTENDED RELEASE ORAL DAILY
Qty: 90 TABLET | Refills: 1 | Status: SHIPPED | OUTPATIENT
Start: 2025-04-14

## 2025-04-14 NOTE — TELEPHONE ENCOUNTER
Essential hypertension  Blood pressure elevated has been running elevated at home will increase metoprolol by another 25 mg for a total of 125 mg daily  - metoprolol succinate ER (TOPROL XL) 25 MG 24 hr tablet  Dispense: 100 tablet; Refill: 1

## 2025-05-12 ENCOUNTER — TELEPHONE (OUTPATIENT)
Dept: FAMILY MEDICINE | Facility: CLINIC | Age: 75
End: 2025-05-12
Payer: COMMERCIAL

## 2025-05-12 NOTE — TELEPHONE ENCOUNTER
Patient should hold Mounjaro for the week prior to the colonoscopy-do not take oral medication metformin -as she will be fasting-decrease Lantus by 50% the day prior when fasting.

## 2025-05-12 NOTE — TELEPHONE ENCOUNTER
General Call      Reason for Call:  pt calling and stating she has an upcoming Colonoscopy scheduled for 05/29/2025 and is wondering about the diabetic medications oral and the injectable    Please advise    What are your questions or concerns:  n/a    Date of last appointment with provider: n/a    Could we send this information to you in [facility] or would you prefer to receive a phone call?:   Patient would prefer a phone call   Okay to leave a detailed message?: Yes at Home number on file [phone] (home)

## 2025-05-15 ENCOUNTER — VIRTUAL VISIT (OUTPATIENT)
Dept: PHARMACY | Facility: CLINIC | Age: 75
End: 2025-05-15
Payer: COMMERCIAL

## 2025-05-15 DIAGNOSIS — K59.00 CONSTIPATION, UNSPECIFIED CONSTIPATION TYPE: ICD-10-CM

## 2025-05-15 DIAGNOSIS — M79.18 MUSCULOSKELETAL PAIN: Primary | ICD-10-CM

## 2025-05-15 DIAGNOSIS — Z79.4 TYPE 2 DIABETES MELLITUS WITH DIABETIC NEUROPATHY, WITH LONG-TERM CURRENT USE OF INSULIN (H): ICD-10-CM

## 2025-05-15 DIAGNOSIS — M85.80 OSTEOPENIA WITH HIGH RISK OF FRACTURE: ICD-10-CM

## 2025-05-15 DIAGNOSIS — E11.40 TYPE 2 DIABETES MELLITUS WITH DIABETIC NEUROPATHY, WITH LONG-TERM CURRENT USE OF INSULIN (H): ICD-10-CM

## 2025-05-15 PROCEDURE — 99606 MTMS BY PHARM EST 15 MIN: CPT | Mod: 93 | Performed by: PHARMACIST

## 2025-05-15 PROCEDURE — 99607 MTMS BY PHARM ADDL 15 MIN: CPT | Mod: 93 | Performed by: PHARMACIST

## 2025-05-15 RX ORDER — INSULIN ASPART 100 [IU]/ML
0-14 INJECTION, SOLUTION INTRAVENOUS; SUBCUTANEOUS
Qty: 60 ML | Refills: 0 | Status: SHIPPED | OUTPATIENT
Start: 2025-05-15

## 2025-05-15 RX ORDER — INSULIN GLARGINE 100 [IU]/ML
10 INJECTION, SOLUTION SUBCUTANEOUS DAILY
Qty: 15 ML | Refills: 0 | Status: SHIPPED | OUTPATIENT
Start: 2025-05-15

## 2025-05-15 NOTE — PROGRESS NOTES
Medication Therapy Management (MTM) Encounter    ASSESSMENT:                            Medication Adherence/Access: See below for considerations.    Joint/Achy pain: potential that dose increase of metoprolol is causing joint/ache pain side effect.  Reviewed micromedex side effect profile with Patient - Arthralgia side effect case reports for metoprolol tartrate.  Recommend that Patient continue monitoring symptoms, as the cause could also be change in physical activity, or the recent mounjaro start. If symptoms do not improve recommend consult with primary care provider. Could try re-lowering metoprolol and/or holding mounjaro. Notable that if opting to hold mounjaro, given the long t1/2 will need to consider that with washout period.  Okay to treat with OTC NSAID/acetaminophen for intermittent pain, as symptoms could be from musculoskeletal strain /change in activity.    Diabetes:  Patient's A1c of 8% is not at ADA goal of <7%.   SMBG fasting sugars are not all at ADA goal of  mg/dL and post-prandial sugars are not all at goal of less than 180 mg/dL.  Patient time in range has increased from 49 to 73%, significant improvement.    Patient would benefit from:    -GLP-1/GIP: recommend titrating as tolerated to minimize insulin needs. However since Patient ordered 3 month supply, will wait to titrate until she finishes current supply.  Discussed with Patient that given her previous c-petide lab result, it's unclear whether the GLP-1/GIP is effective for her because of the Pancreatic effect stimulating insulin production v. Increased satiety and smaller portions resulting in lower blood sugars. If she truly has no natural insulin production then we discussed that she will likely need to remain on insulin longterm. However, if she does have some insulin production and the GLP-1/GIP is effective for lowering blood sugars (via several mechanisms), then we can minimize her insulin dose and optimize GLP-1/GIP.  -Long  acting insulin: decrease slightly due to overnight low blood sugars.  -Short acting insulin: recommend adjusting sliding scale parameters and slightly reducing insulin to decrease risk of low blood sugars, especially since her mounjaro is relatively new and may see higher efficacy as the agent reaches steady state.  -metformin: plan in place to change to XR formulation.    Bone Health   /osteopenia with high FRAX score: plan in place to take bisphosphonate drug holiday.    Constipation stable with current regimen.    PLAN:                            1. Mounjaro - In the future, I recommend only filling 1 month at a time so that we can easily increase the dose.  You can finish your current supply and then we will discuss a possible dose increase in a few months.    2. Decrease lantus to 10 units once daily in the morning.    3. Adjust your short acting insulin:   Morning meal 4 units + sliding scale  Noon meal: only sliding scale  Evening meal: only sliding scale    Sliding scale outline.  Less than 200 -no additional insulin  201-250 2 units  251-300 4 units  301-350 6 units  351-400 8 units  401 + 10 units, recheck in 2 hours and if still high contact provider    If you have repeat low blood sugars, please call clinic [phone] so that we can make a medication adjustment before your follow-up visit.    4.  When you finish the current metformin 1000 mg tablets, changed to metformin  mg tablets: Take 2 tablets by mouth twice daily    5. Monitor the joint / achy / pain symptoms. It could be from the metoprolol dose increase, unclear though.   If the symptoms persist then recommend asking Dr. Gresham if you can try lowering the metoprolol dose again and monitor for any change in symptoms -- or whether he would like alternate workup.  You can use either ibuprofen or acetaminophen for intermittent pain management in case the symptoms are from another cause.    Follow-up: 6/5/2025 at 2:30p by  telephone.    SUBJECTIVE/OBJECTIVE:                          Nancy Hughes is a 74 year old female seen for a follow-up visit.       Reason for visit: diabetes follow-up.  New concern - Muscle aches and pain, joint pain.    Allergies/ADRs: Reviewed in chart  Past Medical History: Reviewed in chart  Tobacco: She reports that she quit smoking about 46 years ago. Her smoking use included cigarettes. She started smoking about 57 years ago. She has a 19.6 pack-year smoking history. She has been exposed to tobacco smoke. She has never used smokeless tobacco.  Alcohol: Less than 1 beverage / month  Caffeine: 1 cup of coffee day (1/2 regular and half decaf), 1/2 can caffeinated pop per day.     Medication Adherence/Access: Patient self-manages medications, no trouble affording meds at this time.  Takes certain meds in the morning and certain meds at supper time and then gabapentin at bedtime.    Joint/Achy pain:  Patient reports relatively new joint/achy pain symptoms.  No clear cause per Patient.  Recently increased metoprolol.     Diabetes   Patient reports that she had a c-peptide test in the past and that showed she had low insulin production and that is when she started on the mealtime insulin (in addition to the other meds)-  States that this greatly improved her diabetes control.    CDCES: not currently following  Mounjaro 7.5 mg: subcut injection weekly x 3 weeks (changed from trulicity), notes higher level appetite suppression.  Side effect? The symptoms of stomach sticking out at night and feeling like there are objects in her bowels is improved (compared to ozempic).  Lantus (glargine) long acting insulin: 12 units daily in the morning upon awakening  Novolog (aspart) short acting insulin: three times daily with meals.    AM: take 5 units + sliding scale notes that she was prescribed 6 units + sliding scale but since she's had lower readings, then she decreased to 5 units.  Noon meal: take 2 or 3 units + sliding  scale (unless eating out then adds more) has mostly been using 1 unit + sliding scale since starting mounjaro, averages to be 1 unit total  Evening meal: taking 2 or 3 units + sliding scale (unless eating out then adds more) has mostly been using 1 unit + sliding scale since starting mounjaro, averages to be 1 unit total    Sliding scale outline.  150-200 2 units  201-250 4 units  251-300 6 units  301-350 8 units  351-400 10 units  401 + 12 units, recheck in 2 hours and if still high contact provider    Metformin 1000 m tab twice daily, finishing this supply and then planning to change to Metformin  m tabs twice daily with meals - has not yet started this.    Aspirin 81 mg: once daily - no concerns about bruising/bleeding, no history of MI or CVA per Patient report. Fall history: none, does feel stable, no walker or cane.  Glucagon nasal powder Patient has to use as needed.  Has gummy worms/fruit snacks, orange juice. Prefers these over the glucose tabs.    Side effect?   See constipation details -below.    Blood sugar monitoring:  Using freestyle franny alarm for lows (set at 80 mg/dL).         and  has 67 mg/dL - had low blood sugar overnight. A few lows during the daytime.    Diabetes symptoms: Neuropathy- taking gabapentin  -Diet:three meals/day + sometimes snack in the evening (not as hungry for large meals and not as hungry for snacks).  -Exercise: gym a couple times/week; [facility] [employer] [facility] [employer] IF not at the gym, then walks at home.    Urine albumin is   Lab Results   Component Value Date    ALCR  2025      Comment:      Unable to calculate, urine albumin and/or urine creatinine is outside detectable limits.  Microalbuminuria is defined as an albumin:creatinine ratio of 17 to 299 for males and 25 to 299 for females. A ratio of albumin:creatinine of 300 or higher is indicative of overt proteinuria.  Due to biologic variability, positive  "results should be confirmed by a second, first-morning random or 24-hour timed urine specimen. If there is discrepancy, a third specimen is recommended. When 2 out of 3 results are in the microalbuminuria range, this is evidence for incipient nephropathy and warrants increased efforts at glucose control, blood pressure control, and institution of therapy with an angiotensin-converting-enzyme (ACE) inhibitor (if the patient can tolerate it).       Lab Results   Component Value Date    A1C 8.0 2025    A1C 7.6 2025    A1C 8.1 2024    A1C 7.9 2023    A1C 8.3 2023     Home weight:  Not checking at this time.  Wt Readings from Last 2 Encounters:   25 159 lb 9.6 oz (72.4 kg)   25 152 lb (68.9 kg)     Estimated body mass index is 26.79 kg/m  as calculated from the following:    Height as of 25: 5' 4.72\" (1.644 m).    Weight as of 25: 159 lb 9.6 oz (72.4 kg).     Eye exam is up to date  Foot exam is up to date    Bone Health   /osteopenia with high FRAX score:  Alendronate 70 m tab once weekly on  - takes first thing in the morning and waits 30 min before eating anything else or drinking anything else. Discussed with Dr. Gresham, planning to finish current supply (5-6 weeks) and then discontinue.    History/duration of use: per 19 visit note Patient recently started on fosamax by an osteoporosis specialist.  Patient does think that she's been taking alendronate consistently since that time.    Calcium citrate 300 m tab AM and 2 tabs PM (changed to calcium formulation without vit D).  + multivitamin (contains vitD 400 international unit(s))  Dietary calcium: cheese at lunch daily, estimates 1 serving of calcium per day.    Patient is not experiencing side effects.  DEXA History: 2024, 2021  L1-L4: -2.0  Right femur neck -2.0  Right femur total -1.2  Left femur neck -2.1  Left femur total -1.4    Risk factors: post-menopausal    Vitamin " D Deficiency Screening Results:  Lab Results   Component Value Date    VITDT 67 (H) 04/07/2025   *after 4/7 vit D lab Patient planning to change from calcium+vitD to plain calcium citrate without vit D since she gets vit D in her MVI.       Constipation   5/15: BMs are softer and easier to pass.  Usually has a BM every 2-3 days.  Docusate sodium 100 mg: taking 1 capsule daily  Miralax (PEG powder): using ~1/2 capful of powder mixed in beverage of choice three times per week to keep bowels soft and regular.  Medication History: was using Senakot historically, miralax (unsure if effective)         Today's Vitals: LMP  (LMP Unknown)   ----------------    I spent 50 minutes with this patient today. All changes were made via collaborative practice agreement with Jorge Luis Gresham MD.     A summary of these recommendations was sent via [employer].    Kimberly MerrittD  Medication Therapy Management (MTM) Pharmacist    Telemedicine Visit Details  The patient's medications can be safely assessed via a telemedicine encounter.  Type of service:  Telephone visit  Originating Location (pt. Location): Home    Distant Location (provider location):  Off-site  Start Time: 2:30 pm  End Time: 3:20 pm     Medication Therapy Recommendations  Type 2 diabetes mellitus with diabetic neuropathy, with long-term current use of insulin (H)   1 Current Medication: insulin aspart (NOVOLOG FLEXPEN RELION) 100 UNIT/ML pen   Current Medication Sig: Inject 0-14 Units subcutaneously 3 times daily (with meals). As directed following the sliding scale provided.   Rationale: Dose too high - Dosage too high - Safety   Recommendation: Decrease Dose   Status: Accepted per CPA   Identified Date: 5/15/2025 Completed Date: 5/15/2025         2 Current Medication: insulin glargine (LANTUS SOLOSTAR) 100 UNIT/ML pen   Current Medication Sig: Inject 10 Units subcutaneously daily.   Rationale: Dose too high - Dosage too high - Safety   Recommendation: Decrease  Dose   Status: Accepted per CPA   Identified Date: 5/15/2025 Completed Date: 5/15/2025

## 2025-05-15 NOTE — PATIENT INSTRUCTIONS
"Recommendations from today's MTM visit:                                                    MTM (medication therapy management) is a service provided by a clinical pharmacist designed to help you get the most of out of your medicines.      1. Mounjaro - In the future, I recommend only filling 1 month at a time so that we can easily increase the dose.  You can finish your current supply and then we will discuss a possible dose increase in a few months.    2. Decrease lantus to 10 units once daily in the morning.    3. Adjust your short acting insulin:   Morning meal 4 units + sliding scale  Noon meal: only sliding scale  Evening meal: only sliding scale    Sliding scale outline.  Less than 200 -no additional insulin  201-250 2 units  251-300 4 units  301-350 6 units  351-400 8 units  401 + 10 units, recheck in 2 hours and if still high contact provider    If you have repeat low blood sugars, please call clinic [phone] so that we can make a medication adjustment before your follow-up visit.    4.  When you finish the current metformin 1000 mg tablets, changed to metformin  mg tablets: Take 2 tablets by mouth twice daily    5. Monitor the joint / achy / pain symptoms. It could be from the metoprolol dose increase, unclear though.   If the symptoms persist then recommend asking Dr. Gresham if you can try lowering the metoprolol dose again and monitor for any change in symptoms -- or whether he would like alternate workup.  You can use either ibuprofen or acetaminophen for intermittent pain management in case the symptoms are from another cause.    Follow-up: 6/5/2025 at 2:30p by telephone.    It was great speaking with you today.  I value your experience and would be very thankful for your time in providing feedback in our clinic survey. In the next few days, you may receive an email or text message from [employer] with a link to a survey related to your  clinical pharmacist.\"     To schedule another MTM " appointment, please call the clinic directly or you may call the MTM scheduling line at [phone].    My Clinical Pharmacist's contact information:                                                      Please feel free to contact me with any questions or concerns you have.      Lucia Lombardi, Macie  Medication Therapy Management (MTM) Pharmacist

## 2025-06-05 ENCOUNTER — VIRTUAL VISIT (OUTPATIENT)
Dept: PHARMACY | Facility: CLINIC | Age: 75
End: 2025-06-05
Payer: COMMERCIAL

## 2025-06-05 DIAGNOSIS — Z79.4 TYPE 2 DIABETES MELLITUS WITH DIABETIC NEUROPATHY, WITH LONG-TERM CURRENT USE OF INSULIN (H): Primary | ICD-10-CM

## 2025-06-05 DIAGNOSIS — E11.40 TYPE 2 DIABETES MELLITUS WITH DIABETIC NEUROPATHY, WITH LONG-TERM CURRENT USE OF INSULIN (H): Primary | ICD-10-CM

## 2025-06-05 NOTE — PROGRESS NOTES
Medication Therapy Management (MTM) Encounter    ASSESSMENT:                            Medication Adherence/Access: See below for considerations.    diabetes:  Patient's A1c of 8% is not at ADA goal of <7%.   Discussed with patient that her blood sugars last week were after she had missed her Mounjaro dose and therefore I will not use these sugar results to adjust her medication therapy because they were not truly reflective of her current regimen.  Therefore, we review just her past weeks worth of blood sugars    SMBG fasting sugars are mostly at ADA goal of  mg/dL -discussed that I recommend checking fingersticks for the blood sugars that are less than 80 before she corrects with fast acting sugar.  If the blood sugar is not truly less than 80, I recommend that she adjust her low alarm to be set to less than 70 and not take fast acting sugar for a number that is above 80.    and post-prandial sugars are not all at goal of less than 180 mg/dL - and some HPYOglycemia.    Patient would benefit from:  -Metformin: plan in place to change to XR formulation, continue current 2000 mg/day (at max 2000 mg/day)  -Mounjaro: Plan to titrate in the future as tolerated; will continue current dose at this time since she has supply for a couple of months and has already paid for this.  -Long acting insulin: Continue current dose and recommend verifying blood sugars that are less than 70 overnight (see above)  -Short acting insulin:  recommend continuing with the reduced short acting insulin doses because this has resulted in less hypoglycemia than previous.  Recommend lowering her insulin dose with her morning meal on the days that she is planning to go to the gym to exercise to prevent hypoglycemia during her exercise.      PLAN:                            1.  On days that you plan to go to the gym for the 45-minute exercise class, decrease the morning short acting mealtime insulin dose by 1 unit.     Otherwise continue  the current short acting insulin schedule:    Morning meal 4 units + sliding scale  Noon meal: only sliding scale  Evening meal: only sliding scale     Sliding scale outline.  Less than 200 -no additional insulin  201-250 2 units  251-300 4 units  301-350 6 units  351-400 8 units  401 + 10 units, recheck in 2 hours and if still high contact provider     If you have repeat low blood sugars, please call clinic [phone] so that we can make a medication adjustment before your follow-up visit.    2. Mounjaro - In the future, I recommend only filling 1 month at a time so that we can easily increase the dose.   You can finish your current supply and then we will plan to increase your dose in a few months (and also lower the insulin).    3.  Anytime you have a blood sugar less than 80 on the continuous glucose monitor, I recommend checking the blood sugar with a fingerstick reading.  The fingerstick reading will give you a real-time blood sugar reading (the continuous glucose monitor reading is slightly delayed).  Additionally, sometimes the continuous glucose monitor is less accurate when you are having higher blood sugars or lower blood sugars (on the far ends of normal). Therefore, a fingerstick reading at these times is more accurate.    -If the blood sugar on your fingerstick aligns with the continuous glucose monitor reading and it is truly less than 80, you can continue as you have been.  -If the blood sugar on your fingerstick is actually much higher in the 90s, 100s, or higher you do not need to take fast acting sugar.  Additionally, then I recommend changing your low blood sugar alarm to only alert you if the blood sugars less than 70 on the continuous glucose monitor.     Follow-up: scheduled for Wednesday 7/9 at 230 by telephone.    SUBJECTIVE/OBJECTIVE:                          Nancy Hughes is a 74 year old female seen for a follow-up visit.       Reason for visit: diabetes follow-up.    Allergies/ADRs:  Reviewed in chart  Past Medical History: Reviewed in chart  Tobacco: She reports that she quit smoking about 46 years ago. Her smoking use included cigarettes. She started smoking about 57 years ago. She has a 19.6 pack-year smoking history. She has been exposed to tobacco smoke. She has never used smokeless tobacco.  Alcohol: Less than 1 beverage / month  Caffeine: 1 cup of coffee day (1/2 regular and half decaf), 1/2 can caffeinated pop per day.     Medication Adherence/Access: Patient self-manages medications, no trouble affording meds at this time.  Takes certain meds in the morning and certain meds at supper time and then gabapentin at bedtime.    Diabetes   History: patient reports that she had a c-peptide test in the past and that showed she had low insulin production and that is when she started on the mealtime insulin (in addition to the other meds)-  States that this greatly improved her diabetes control.    CDCES: not currently following  Mounjaro 7.5 mg: subcut injection weekly (changed from trulicity), patient filled a 3-month supply of this and has 2-1/2 months remaining at this time.  She skipped her dose about 2 weeks ago because she had a colonoscopy last week.  Side effect?  Less GI upset than Ozempic  Lantus (glargine) long acting insulin: 10 units daily in the morning upon awakening (dose was decreased at last MT Visit)  Novolog (aspart) short acting insulin: three times daily with meals.    Morning meal 4 units + sliding scale  Noon meal: only sliding scale patient notes that often times she does not need to use any insulin at this meal  Evening meal: only sliding scale patient notes that often times she does not need to use any insulin at this meal     Sliding scale outline.  Less than 200 -no additional insulin  201-250 2 units  251-300 4 units  301-350 6 units  351-400 8 units  401 + 10 units, recheck in 2 hours and if still high contact provider    Metformin 1000 m tab twice daily,  finishing this supply and then planning to change to Metformin  m tabs twice daily with meals - has not yet started this, will be starting within the next 5 weeks.    Aspirin 81 mg: once daily - no concerns about bruising/bleeding, no history of MI or CVA per Patient report. Fall history: none, does feel stable, no walker or cane.  Glucagon nasal powder Patient has to use as needed.  Has gummy worms/fruit snacks, orange juice. Prefers these over the glucose tabs.    Blood sugar monitoring:  Using freestyle franny alarm for lows (set at 80 mg/dL, notes that she will correct anytime the blood sugars less than 80-she has not done a fingerstick to check the accuracy of this blood sugar reading).  Low blood sugar on -this happened while she was at the gym exercising.    Diabetes symptoms: Neuropathy- taking gabapentin  -Diet:three meals/day + sometimes snack in the evening (not as hungry for large meals and not as hungry for snacks).  Coffee in the morning with powder creamer and milk, then eats between 6:30-8a.  Feels that her blood sugar does start increasing as soon as she wakes up even if she has not had something to eat    -Exercise: gym a couple times/week; takes [employer] [facility] [employer] ([facility] 45 min class). If not at the gym, then walks at home (25 min).    Urine albumin is   Lab Results   Component Value Date    ALCR  2025      Comment:      Unable to calculate, urine albumin and/or urine creatinine is outside detectable limits.  Microalbuminuria is defined as an albumin:creatinine ratio of 17 to 299 for males and 25 to 299 for females. A ratio of albumin:creatinine of 300 or higher is indicative of overt proteinuria.  Due to biologic variability, positive results should be confirmed by a second, first-morning random or 24-hour timed urine specimen. If there is discrepancy, a third specimen is recommended. When 2 out of 3 results are in the microalbuminuria  "range, this is evidence for incipient nephropathy and warrants increased efforts at glucose control, blood pressure control, and institution of therapy with an angiotensin-converting-enzyme (ACE) inhibitor (if the patient can tolerate it).       Lab Results   Component Value Date    A1C 8.0 04/07/2025    A1C 7.6 01/07/2025    A1C 8.1 06/06/2024    A1C 7.9 12/18/2023    A1C 8.3 06/19/2023     Home weight:  Not checking at this time.  Wt Readings from Last 2 Encounters:   04/07/25 159 lb 9.6 oz (72.4 kg)   01/07/25 152 lb (68.9 kg)     Estimated body mass index is 26.79 kg/m  as calculated from the following:    Height as of 4/7/25: 5' 4.72\" (1.644 m).    Weight as of 4/7/25: 159 lb 9.6 oz (72.4 kg).     Eye exam is up to date  Foot exam is up to date    Today's Vitals: LMP  (LMP Unknown)   ----------------    I spent 35 minutes with this patient today. All changes were made via collaborative practice agreement with Jorge Luis Gresham MD.     A summary of these recommendations was sent via [employer].    Kimberly MerrittD  Medication Therapy Management (MTM) Pharmacist    Telemedicine Visit Details  The patient's medications can be safely assessed via a telemedicine encounter.  Type of service:  Telephone visit  Originating Location (pt. Location): Home    Distant Location (provider location):  Off-site  Start Time: 2:35 PM  End Time: 3:10 PM     Medication Therapy Recommendations  Type 2 diabetes mellitus with diabetic neuropathy, with long-term current use of insulin (H)   1 Current Medication: insulin aspart (NOVOLOG FLEXPEN RELION) 100 UNIT/ML pen   Current Medication Sig: Inject 0-14 Units subcutaneously 3 times daily (with meals). As directed following the sliding scale provided.   Rationale: Dose too high - Dosage too high - Safety   Recommendation: Decrease Dose   Status: Accepted per CPA   Identified Date: 6/5/2025 Completed Date: 6/5/2025             "

## 2025-06-05 NOTE — PATIENT INSTRUCTIONS
Recommendations from today's MTM visit:                                                    MTM (medication therapy management) is a service provided by a clinical pharmacist designed to help you get the most of out of your medicines.      1.  On days that you plan to go to the gym for the 45-minute exercise class, decrease the morning short acting mealtime insulin dose by 1 unit.     Otherwise continue the current short acting insulin schedule:    Morning meal 4 units + sliding scale  Noon meal: only sliding scale  Evening meal: only sliding scale     Sliding scale outline.  Less than 200 -no additional insulin  201-250 2 units  251-300 4 units  301-350 6 units  351-400 8 units  401 + 10 units, recheck in 2 hours and if still high contact provider     If you have repeat low blood sugars, please call clinic [phone] so that we can make a medication adjustment before your follow-up visit.    2. Mounjaro - In the future, I recommend only filling 1 month at a time so that we can easily increase the dose.   You can finish your current supply and then we will plan to increase your dose in a few months (and also lower the insulin).    3.  Anytime you have a blood sugar less than 80 on the continuous glucose monitor, I recommend checking the blood sugar with a fingerstick reading.  The fingerstick reading will give you a real-time blood sugar reading (the continuous glucose monitor reading is slightly delayed).  Additionally, sometimes the continuous glucose monitor is less accurate when you are having higher blood sugars or lower blood sugars (on the far ends of normal). Therefore, a fingerstick reading at these times is more accurate.    -If the blood sugar on your fingerstick aligns with the continuous glucose monitor reading and it is truly less than 80, you can continue as you have been.  -If the blood sugar on your fingerstick is actually much higher in the 90s, 100s, or higher you do not need to take fast acting  "sugar.  Additionally, then I recommend changing your low blood sugar alarm to only alert you if the blood sugars less than 70 on the continuous glucose monitor.     Follow-up: scheduled for Wednesday 7/9 at 230 by telephone.    It was great speaking with you today.  I value your experience and would be very thankful for your time in providing feedback in our clinic survey. In the next few days, you may receive an email or text message from [employer] with a link to a survey related to your  clinical pharmacist.\"     To schedule another MTM appointment, please call the clinic directly or you may call the MTM scheduling line at [phone].    My Clinical Pharmacist's contact information:                                                      Please feel free to contact me with any questions or concerns you have.      Lucia Lombardi, PharmD  Medication Therapy Management (MTM) Pharmacist   "

## 2025-06-12 DIAGNOSIS — E11.40 TYPE 2 DIABETES MELLITUS WITH DIABETIC NEUROPATHY, WITH LONG-TERM CURRENT USE OF INSULIN (H): Primary | ICD-10-CM

## 2025-06-12 DIAGNOSIS — Z79.4 TYPE 2 DIABETES MELLITUS WITH DIABETIC NEUROPATHY, WITH LONG-TERM CURRENT USE OF INSULIN (H): Primary | ICD-10-CM

## 2025-06-12 NOTE — TELEPHONE ENCOUNTER
Patient called in and wants a KloudCatch 2 plus sensor READER sent to pharmacy. She told me for about a day now she cannot hook this to her phone and she needs to know her levels. Pharmacy attached.

## 2025-07-09 ENCOUNTER — VIRTUAL VISIT (OUTPATIENT)
Dept: PHARMACY | Facility: CLINIC | Age: 75
End: 2025-07-09
Payer: COMMERCIAL

## 2025-07-09 DIAGNOSIS — Z79.4 TYPE 2 DIABETES MELLITUS WITH DIABETIC NEUROPATHY, WITH LONG-TERM CURRENT USE OF INSULIN (H): Primary | ICD-10-CM

## 2025-07-09 DIAGNOSIS — E11.40 TYPE 2 DIABETES MELLITUS WITH DIABETIC NEUROPATHY, WITH LONG-TERM CURRENT USE OF INSULIN (H): Primary | ICD-10-CM

## 2025-07-09 PROCEDURE — 99606 MTMS BY PHARM EST 15 MIN: CPT | Mod: 93 | Performed by: PHARMACIST

## 2025-07-09 PROCEDURE — 99607 MTMS BY PHARM ADDL 15 MIN: CPT | Mod: 93 | Performed by: PHARMACIST

## 2025-07-09 RX ORDER — INSULIN ASPART 100 [IU]/ML
0-13 INJECTION, SOLUTION INTRAVENOUS; SUBCUTANEOUS
Qty: 60 ML | Refills: 0 | Status: SHIPPED | OUTPATIENT
Start: 2025-07-09

## 2025-07-09 RX ORDER — INSULIN GLARGINE 100 [IU]/ML
8 INJECTION, SOLUTION SUBCUTANEOUS DAILY
Qty: 15 ML | Refills: 0 | Status: SHIPPED | OUTPATIENT
Start: 2025-07-09

## 2025-07-09 NOTE — PATIENT INSTRUCTIONS
"Recommendations from today's MTM visit:                                                    MTM (medication therapy management) is a service provided by a clinical pharmacist designed to help you get the most of out of your medicines.      1. Change the short acting insulin schedule:     Morning meal 3 units + sliding scale *On days that you plan to go to the gym for the 45-minute exercise class, decrease the morning short acting mealtime insulin dose by 1 unit.   Noon meal: only sliding scale  Evening meal: only sliding scale     Sliding scale outline.  Less than 200 -no additional insulin  201-250 2 units  251-300 4 units  301-350 6 units  351-400 8 units  401 + 10 units, recheck in 2 hours and if still high contact provider    If you have repeat low blood sugars, please call clinic [phone] so that we can make a medication adjustment before your follow-up visit.    2. Decrease the long acting insulin to 8 units once daily    3. Recommend uploading your freestyle franny reader to the [employer] portal website. Use the yellow USB charging cord to connect the reader to your computer. Login, and upload the blood sugar results. Do this the morning of our follow-up appointment so we can review the readings together.    4. Due for A1c Lab-  ordered today.    Future: plan to increase mounjaro, can consider stopping insulin (with close monitoring given history of low c-peptide).    Follow-up: scheduled for Friday 8/1 at 1:30p virtual. If you are unable to get your continuous glucose monitor reader to upload remotely, then recommend changing this visit to in person.    It was great speaking with you today.  I value your experience and would be very thankful for your time in providing feedback in our clinic survey. In the next few days, you may receive an email or text message from [employer] with a link to a survey related to your  clinical pharmacist.\"     To schedule another MTM appointment, please call the clinic " directly or you may call the MTM scheduling line at [phone].    My Clinical Pharmacist's contact information:                                                      Please feel free to contact me with any questions or concerns you have.      Lucia Lombardi, Macie  Medication Therapy Management (MTM) Pharmacist

## 2025-07-09 NOTE — PROGRESS NOTES
Medication Therapy Management (MTM) Encounter    ASSESSMENT:                            Medication Adherence/Access: See below for considerations.    Diabetes:  Patient's A1c of 8.0% is not at ADA goal of <7%.   Self-monitoring blood glucose are mostly unknown, but recommend insulin adjustments based on reported low blood sugars.    Patient would benefit from :  Continuous glucose monitor: uploading reader to [employer] portal before MTM Visit. Patient will try this later at home.    -Metformin: plan in place to change to XR formulation, continue current 2000 mg/day (at max 2000 mg/day)  -Mounjaro: Plan to titrate in the future as tolerated; will continue current dose at this time since she has supply for several weeks and has already paid for this.  -Long acting insulin: decrease dose by 2 units due to the overnight low blood sugars.  -Short acting insulin: decrease standard AM insulin dose, continue sliding scale dosing. Continue reducing dose by 1 unit on the days planning to exercise- education provided about trying to remember this change and importance to prevent low blood sugars.    PLAN:                            1. Change the short acting insulin schedule:     Morning meal 3 units + sliding scale *On days that you plan to go to the gym for the 45-minute exercise class, decrease the morning short acting mealtime insulin dose by 1 unit.   Noon meal: only sliding scale  Evening meal: only sliding scale     Sliding scale outline.  Less than 200 -no additional insulin  201-250 2 units  251-300 4 units  301-350 6 units  351-400 8 units  401 + 10 units, recheck in 2 hours and if still high contact provider    If you have repeat low blood sugars, please call clinic [phone] so that we can make a medication adjustment before your follow-up visit.    2. Decrease the long acting insulin to 8 units once daily    3. Recommend uploading your freestyle franny reader to the [employer] portal website. Use the yellow USB  charging cord to connect the reader to your computer. Login, and upload the blood sugar results. Do this the morning of our follow-up appointment so we can review the readings together.    4. Due for A1c Lab. future ordered today.    Future: plan to increase mounjaro, can consider stopping insulin (with close monitoring given her reported history of low c-peptide).    Follow-up: scheduled for Friday 8/1 at 1:30p virtual. If you are unable to get your continuous glucose monitor reader to upload remotely, then recommend changing this visit to in person.    SUBJECTIVE/OBJECTIVE:                          Nancy Hughes is a 74 year old female seen for a follow-up visit.       Reason for visit: diabetes follow-up.    Allergies/ADRs: Reviewed in chart  Past Medical History: Reviewed in chart  Tobacco: She reports that she quit smoking about 46 years ago. Her smoking use included cigarettes. She started smoking about 57 years ago. She has a 19.6 pack-year smoking history. She has been exposed to tobacco smoke. She has never used smokeless tobacco.  Alcohol: Less than 1 beverage / month  Caffeine: 1 cup of coffee day (1/2 regular and half decaf), 1/2 can caffeinated pop per day.     Medication Adherence/Access: Patient self-manages medications, no trouble affording meds at this time.  Takes certain meds in the morning and certain meds at supper time and then gabapentin at bedtime.  Reviewed diabetes meds specifically today.    Diabetes   History: patient reports that she had a c-peptide test in the past and that showed she had low insulin production and that is when she started on the mealtime insulin (in addition to the other meds)-  States that this greatly improved her diabetes control.    CDCES: not currently following  Mounjaro 7.5 mg: subcut injection weekly (changed from trulicity), patient filled a 3-month supply of this and has approx 1-1/2 months remaining at this time.  Side effect?  Less GI upset than  Ozempic  Lantus (glargine) long acting insulin: 10 units daily in the morning upon awakening  Novolog (aspart) short acting insulin: three times daily with meals.  Patient states that there are many days so does not use any insulin with noon and evening meal blood sugar blood sugar are not above 200 mg/dL.      Morning meal 4 units + sliding scale *on days when exercising she decreases this to 3 units + sliding scale. Some days when exercising she forgets to take the 3 units and still takes 4 units.  Noon meal: only sliding scale   Evening meal: only sliding scale      Sliding scale outline.  Less than 200 -no additional insulin  201-250 2 units  251-300 4 units  301-350 6 units  351-400 8 units  401 + 10 units, recheck in 2 hours and if still high contact provider    Metformin 1000 m tab twice daily, finishing this supply and then planning to change to Metformin  m tabs twice daily with meals - will be starting the XR formulation soon.    Aspirin 81 mg: once daily; no history of MI or CVA per Patient report. Fall history: none, does feel stable, no walker or cane.  Glucagon nasal powder Patient has to use as needed.  Has gummy worms/fruit snacks, orange juice. Prefers these over the glucose tabs.    Blood sugar monitoring:  Using freestyle franny alarm for lows (set at 75 mg/dL),  Patient got her phone wet and the bluetooth functionality is not working. So instead of her phone/jorge a, she's using the freestyle franny reader device. She has not uploaded any blood sugars to the portal and therefore continuous glucose monitor results are not available today.    Patient notes that she's had a few overnight lows, has taken sugar overnight to treat these.  She has also had low blood sugars after her morning meal/morning mealtime insulin.    Diabetes symptoms: Neuropathy- taking gabapentin  -Diet:three meals/day + sometimes snack in the evening (not as hungry for large meals and not as hungry for snacks).  Coffee  "in the morning with powder creamer and milk, then eats between 6:30-8a.  Feels that her blood sugar does start increasing as soon as she wakes up even if she has not had something to eat    -Exercise: gym a couple times/week; [facility] [employer] [facility] [facility] 45 min class). If not at the gym, then walks at home (25 min).    Urine albumin is   Lab Results   Component Value Date    UMALCR  01/07/2025      Comment:      Unable to calculate, urine albumin and/or urine creatinine is outside detectable limits.  Microalbuminuria is defined as an albumin:creatinine ratio of 17 to 299 for males and 25 to 299 for females. A ratio of albumin:creatinine of 300 or higher is indicative of overt proteinuria.  Due to biologic variability, positive results should be confirmed by a second, first-morning random or 24-hour timed urine specimen. If there is discrepancy, a third specimen is recommended. When 2 out of 3 results are in the microalbuminuria range, this is evidence for incipient nephropathy and warrants increased efforts at glucose control, blood pressure control, and institution of therapy with an angiotensin-converting-enzyme (ACE) inhibitor (if the patient can tolerate it).       Lab Results   Component Value Date    A1C 8.0 04/07/2025    A1C 7.6 01/07/2025    A1C 8.1 06/06/2024    A1C 7.9 12/18/2023    A1C 8.3 06/19/2023     Home weight:  Not checking at this time.  Wt Readings from Last 2 Encounters:   04/07/25 159 lb 9.6 oz (72.4 kg)   01/07/25 152 lb (68.9 kg)     Estimated body mass index is 26.79 kg/m  as calculated from the following:    Height as of 4/7/25: 5' 4.72\" (1.644 m).    Weight as of 4/7/25: 159 lb 9.6 oz (72.4 kg).     Eye exam is up to date  Foot exam is up to date    Today's Vitals: LMP  (LMP Unknown)   ----------------    I spent 21 minutes with this patient today. All changes were made via collaborative practice agreement with Jorge Luis Gresham MD.     A summary of " these recommendations was sent via [employer].    Lucia Lombardi, KimberlyD  Medication Therapy Management (MTM) Pharmacist    Telemedicine Visit Details  The patient's medications can be safely assessed via a telemedicine encounter.  Type of service:  Telephone visit  Originating Location (pt. Location): Home    Distant Location (provider location):  Off-site  Start Time: 2:40p  End Time: 3:01 pm     Medication Therapy Recommendations  Type 2 diabetes mellitus with diabetic neuropathy, with long-term current use of insulin (H)   1 Current Medication: insulin aspart (NOVOLOG FLEXPEN RELION) 100 UNIT/ML pen   Current Medication Sig: Inject 0-14 Units subcutaneously 3 times daily (with meals). As directed following the sliding scale provided.   Rationale: Dose too high - Dosage too high - Safety   Recommendation: Decrease Dose   Status: Accepted per CPA   Identified Date: 7/9/2025 Completed Date: 7/9/2025         2 Current Medication: insulin glargine (LANTUS SOLOSTAR) 100 UNIT/ML pen   Current Medication Sig: Inject 10 Units subcutaneously daily.   Rationale: Dose too high - Dosage too high - Safety   Recommendation: Decrease Dose   Status: Accepted per CPA   Identified Date: 7/9/2025 Completed Date: 7/9/2025

## 2025-07-17 ENCOUNTER — TRANSFERRED RECORDS (OUTPATIENT)
Dept: HEALTH INFORMATION MANAGEMENT | Facility: CLINIC | Age: 75
End: 2025-07-17
Payer: COMMERCIAL

## 2025-08-30 ENCOUNTER — MYC MEDICAL ADVICE (OUTPATIENT)
Dept: FAMILY MEDICINE | Facility: CLINIC | Age: 75
End: 2025-08-30
Payer: COMMERCIAL

## 2025-08-30 DIAGNOSIS — I10 ESSENTIAL HYPERTENSION, BENIGN: ICD-10-CM

## 2025-08-30 DIAGNOSIS — I10 ESSENTIAL HYPERTENSION: ICD-10-CM

## 2025-09-03 RX ORDER — METOPROLOL SUCCINATE 100 MG/1
100 TABLET, EXTENDED RELEASE ORAL DAILY
Qty: 90 TABLET | Refills: 1 | Status: SHIPPED | OUTPATIENT
Start: 2025-09-03

## 2025-09-03 RX ORDER — METOPROLOL SUCCINATE 25 MG/1
25 TABLET, EXTENDED RELEASE ORAL DAILY
Qty: 90 TABLET | Refills: 1 | Status: SHIPPED | OUTPATIENT
Start: 2025-09-03